# Patient Record
Sex: MALE | Race: WHITE | NOT HISPANIC OR LATINO | Employment: FULL TIME | URBAN - METROPOLITAN AREA
[De-identification: names, ages, dates, MRNs, and addresses within clinical notes are randomized per-mention and may not be internally consistent; named-entity substitution may affect disease eponyms.]

---

## 2017-11-13 ENCOUNTER — OFFICE VISIT (OUTPATIENT)
Dept: ORTHOPEDIC SURGERY | Facility: CLINIC | Age: 57
End: 2017-11-13

## 2017-11-13 VITALS
HEIGHT: 72 IN | BODY MASS INDEX: 23.57 KG/M2 | DIASTOLIC BLOOD PRESSURE: 85 MMHG | WEIGHT: 174 LBS | HEART RATE: 82 BPM | SYSTOLIC BLOOD PRESSURE: 110 MMHG

## 2017-11-13 DIAGNOSIS — M25.552 LEFT HIP PAIN: Primary | ICD-10-CM

## 2017-11-13 DIAGNOSIS — M70.62 TROCHANTERIC BURSITIS OF LEFT HIP: ICD-10-CM

## 2017-11-13 PROCEDURE — 20610 DRAIN/INJ JOINT/BURSA W/O US: CPT | Performed by: ORTHOPAEDIC SURGERY

## 2017-11-13 PROCEDURE — 99204 OFFICE O/P NEW MOD 45 MIN: CPT | Performed by: ORTHOPAEDIC SURGERY

## 2017-11-13 RX ORDER — LISINOPRIL 2.5 MG/1
2.5 TABLET ORAL DAILY
COMMUNITY
End: 2018-10-22 | Stop reason: SDUPTHER

## 2017-11-13 RX ORDER — LIDOCAINE HYDROCHLORIDE 10 MG/ML
4 INJECTION, SOLUTION INFILTRATION; PERINEURAL
Status: COMPLETED | OUTPATIENT
Start: 2017-11-13 | End: 2017-11-13

## 2017-11-13 RX ORDER — BETAMETHASONE SODIUM PHOSPHATE AND BETAMETHASONE ACETATE 3; 3 MG/ML; MG/ML
6 INJECTION, SUSPENSION INTRA-ARTICULAR; INTRALESIONAL; INTRAMUSCULAR; SOFT TISSUE
Status: COMPLETED | OUTPATIENT
Start: 2017-11-13 | End: 2017-11-13

## 2017-11-13 RX ADMIN — LIDOCAINE HYDROCHLORIDE 4 ML: 10 INJECTION, SOLUTION INFILTRATION; PERINEURAL at 08:41

## 2017-11-13 RX ADMIN — BETAMETHASONE SODIUM PHOSPHATE AND BETAMETHASONE ACETATE 6 MG: 3; 3 INJECTION, SUSPENSION INTRA-ARTICULAR; INTRALESIONAL; INTRAMUSCULAR; SOFT TISSUE at 08:41

## 2017-11-13 NOTE — PROGRESS NOTES
Procedure   Large Joint Arthrocentesis  Date/Time: 11/13/2017 8:41 AM  Consent given by: patient  Site marked: site marked  Timeout: Immediately prior to procedure a time out was called to verify the correct patient, procedure, equipment, support staff and site/side marked as required   Supporting Documentation  Indications: pain   Procedure Details  Location: hip - L greater trochanteric bursa  Preparation: Patient was prepped and draped in the usual sterile fashion  Needle size: 22 G  Approach: lateral  Medications administered: 4 mL lidocaine 1 %; 6 mg betamethasone acetate-betamethasone sodium phosphate 6 (3-3) MG/ML (4cc bupivicaine .25% NDC- 73416-079-99, Lot [id number], exp [id number])  Patient tolerance: patient tolerated the procedure well with no immediate complications

## 2017-11-13 NOTE — PROGRESS NOTES
[facility] Note    Subjective     Chief Complaint   Patient presents with   • Left Hip - Pain        HPI      Alex Parr is a 57 y.o. male.  His daughter is Zhane Molina physical therapist in [place].  He's had left hip pain for a year he points to his left hip greater trochanter.  He takes ibuprofen the pain is aching and is 2 out of 10 worse with activity and better with rest.        History reviewed. No pertinent past medical history.   Past Surgical History:   Procedure Laterality Date   • HERNIA REPAIR        Family History   Problem Relation Age of Onset   • No Known Problems Mother    • No Known Problems Father      Social History     Social History   • Marital status:      Spouse name: N/A   • Number of children: N/A   • Years of education: N/A     Occupational History   • Not on file.     Social History Main Topics   • Smoking status: Current Every Day Smoker     Packs/day: 1.00     Types: Cigarettes   • Smokeless tobacco: Never Used   • Alcohol use No   • Drug use: No   • Sexual activity: No     Other Topics Concern   • Not on file     Social History Narrative   • No narrative on file      No current outpatient prescriptions on file prior to visit.     No current facility-administered medications on file prior to visit.       No Known Allergies     The following portions of the patient's history were reviewed and updated as appropriate: allergies, current medications, past family history, past medical history, past social history, past surgical history and problem list.    Review of Systems   Constitutional: Negative.    HENT: Negative.    Eyes: Negative.    Respiratory: Negative.    Cardiovascular: Negative.    Gastrointestinal: Negative.    Endocrine: Negative.    Genitourinary: Negative.    Musculoskeletal: Positive for arthralgias.   Skin: Negative.    Allergic/Immunologic: Negative.    Neurological: Negative.    Hematological: Negative.    Psychiatric/Behavioral:  "Negative.         Objective      Physical Exam  /85  Pulse 82  Ht 72\" (182.9 cm)  Wt 174 lb (78.9 kg)  BMI 23.6 kg/m2    Body mass index is 23.6 kg/(m^2).        GENERAL APPEARANCE: awake, alert & oriented x 3, in no acute distress and well developed, well nourished  PSYCH: normal mood andaffect  LUNGS:  breathing nonlabored, no wheezing  EYES: sclera anicteric, pupils equal  CARDIOVASCULAR: palpable pulses dorsalis pedis, palpable posterior tibial bilaterally. Capillary refill less than 2 seconds  INTEGUMENTARY: skin intact, no clubbing, cyanosis  NEUROLOGIC:  Normal gait and balance            Ortho Exam  Peripheral Vascular  Lower Extremity   Edema - None bilaterally    Musculoskeletal  Lower Extremity   Left Hip    Normal range of motion    No crepitus, instability, subluxation or laxity    No known fractures or dislocations   Right Hip    Normal range of motion    No crepitus, instability, subluxation or laxity    No known fractures or dislocations     Inspection and palpation    Tenderness -      Right - none over greater trochanter      Left -over greater trochanter     Swelling - none bilaterally    Tissue tension/texture is pliable and soft bilaterally     Normal warmth bilaterally   Strength and Tone    Left hip flexors - 5/5     Right hip flexors - 5/5   Deformities/Postures/Misalignments/Discrepancies    No leg length discrepancy   Functional Testing    Stinchfield test positive bilaterally    90-90 straight leg raise negative bilaterally          Imaging/Studies  Imaging Results (last 24 hours)     Procedure Component Value Units Date/Time    XR Hip With or Without Pelvis 1 View Left [[id number]] Resulted:  11/13/17 0839     Updated:  11/13/17 0839    Narrative:       Left Hip X-Ray  Indication: Pain  AP pelvis and Frog Leg views    Findings:  No fracture  No bony lesion  Normal soft tissues  Normal joint spaces    No prior studies were available for comparison.              Assessment/Plan  "     Alex was seen today for pain.    Diagnoses and all orders for this visit:    Left hip pain  -     XR Hip With or Without Pelvis 1 View Left    Trochanteric bursitis of left hip        I gave him a left hip trochanteric bursa injection today.  He will do physical therapy with his daughter.  And follow-up in 3 weeks.    Medical Decision Making  Management Options : over-the-counter medicine, prescription/IM medicine and physical/occupational therapy  Data/Risk: radiology tests and independent visualization of imaging, lab tests, or EMG/NCV    Ranulfo Jones MD  11/13/17  8:40 AM

## 2018-10-22 ENCOUNTER — OFFICE VISIT (OUTPATIENT)
Dept: FAMILY MEDICINE CLINIC | Facility: CLINIC | Age: 58
End: 2018-10-22

## 2018-10-22 VITALS
HEIGHT: 72 IN | TEMPERATURE: 98.2 F | HEART RATE: 91 BPM | WEIGHT: 108.4 LBS | SYSTOLIC BLOOD PRESSURE: 122 MMHG | BODY MASS INDEX: 14.68 KG/M2 | DIASTOLIC BLOOD PRESSURE: 88 MMHG | OXYGEN SATURATION: 98 %

## 2018-10-22 DIAGNOSIS — I10 ESSENTIAL HYPERTENSION: Primary | ICD-10-CM

## 2018-10-22 DIAGNOSIS — E78.5 HYPERLIPIDEMIA, UNSPECIFIED HYPERLIPIDEMIA TYPE: ICD-10-CM

## 2018-10-22 DIAGNOSIS — F17.200 TOBACCO DEPENDENCE: ICD-10-CM

## 2018-10-22 DIAGNOSIS — M10.9 GOUT, UNSPECIFIED CAUSE, UNSPECIFIED CHRONICITY, UNSPECIFIED SITE: ICD-10-CM

## 2018-10-22 PROCEDURE — 90471 IMMUNIZATION ADMIN: CPT | Performed by: PHYSICIAN ASSISTANT

## 2018-10-22 PROCEDURE — 99203 OFFICE O/P NEW LOW 30 MIN: CPT | Performed by: PHYSICIAN ASSISTANT

## 2018-10-22 PROCEDURE — 90686 IIV4 VACC NO PRSV 0.5 ML IM: CPT | Performed by: PHYSICIAN ASSISTANT

## 2018-10-22 PROCEDURE — 99406 BEHAV CHNG SMOKING 3-10 MIN: CPT | Performed by: PHYSICIAN ASSISTANT

## 2018-10-22 RX ORDER — LISINOPRIL 2.5 MG/1
2.5 TABLET ORAL DAILY
Qty: 90 TABLET | Refills: 0 | Status: SHIPPED | OUTPATIENT
Start: 2018-10-22 | End: 2019-01-28 | Stop reason: SDUPTHER

## 2018-10-22 RX ORDER — PRAVASTATIN SODIUM 40 MG
40 TABLET ORAL DAILY
Qty: 90 TABLET | Refills: 0 | Status: SHIPPED | OUTPATIENT
Start: 2018-10-22 | End: 2019-01-28 | Stop reason: SDUPTHER

## 2018-10-22 NOTE — PROGRESS NOTES
I have reviewed the notes, assessments, and/or procedures performed by ABDIEL Storey, I concur with her/his documentation of Alex Parr.

## 2018-10-22 NOTE — PROGRESS NOTES
Chief Complaint   Patient presents with   • Establish Care       HPI     Alex Parr is a pleasant 58 y.o. male with PMH of tobacco dependence, hypertension, hyperlipidemia, and gout who is here to establish care and for follow-up of his chronic conditions. He is a previous patient of Dr. Hurd and JAGDISH Talavera. 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 He has an 18 pack-year history of tobacco use. Current smokes around 1/2 ppd. He quit smoking for about 1 year cold turkey. He has not recently measured blood pressures at home. It was high at an eye appointment but he had not taken his medication that day. Tolerates pravastatin well. He has been off of it for the last month or so. He denies any recent gout flares.     Past Medical History:   Diagnosis Date   • Gout    • Hyperlipidemia    • Hypertension        Past Surgical History:   Procedure Laterality Date   • HERNIA REPAIR         Family History   Problem Relation Age of Onset   • No Known Problems Mother    • No Known Problems Father        Social History     Social History   • Marital status:      Spouse name: N/A   • Number of children: N/A   • Years of education: N/A     Occupational History   • Not on file.     Social History Main Topics   • Smoking status: Current Every Day Smoker     Packs/day: 0.25     Types: Cigarettes   • Smokeless tobacco: Never Used   • Alcohol use 2.4 oz/week     4 Cans of beer per week   • Drug use: No   • Sexual activity: No     Other Topics Concern   • Not on file     Social History Narrative   • No narrative on file       No Known Allergies    ROS    Review of Systems   Constitutional: Negative.  Negative for appetite change, chills, diaphoresis, fatigue, fever, unexpected weight gain and unexpected weight loss.   HENT: Negative.  Negative for ear pain, hearing loss, nosebleeds, rhinorrhea, sore throat, trouble swallowing and voice change.    Eyes: Negative.  Negative for pain,  redness, itching and visual disturbance.   Respiratory: Negative.  Negative for cough, shortness of breath and wheezing.    Cardiovascular: Negative.  Negative for chest pain, palpitations and leg swelling.   Gastrointestinal: Negative.  Negative for abdominal pain, blood in stool, constipation, diarrhea, nausea, vomiting and GERD.   Endocrine: Negative.  Negative for cold intolerance and heat intolerance.   Genitourinary: Negative.  Negative for urinary incontinence, dysuria, frequency, hematuria and urgency.   Musculoskeletal: Negative.  Negative for arthralgias, gait problem, joint swelling and myalgias.   Skin: Negative.  Negative for color change, rash and skin lesions.   Allergic/Immunologic: Negative.    Neurological: Negative.  Negative for dizziness, seizures, syncope, weakness, light-headedness and numbness.        Negative for paresthesia   Hematological: Negative.  Negative for adenopathy. Does not bruise/bleed easily.   Psychiatric/Behavioral: Negative.  Negative for behavioral problems, sleep disturbance, suicidal ideas and depressed mood. The patient is not nervous/anxious.        Vitals:    10/22/18 1139   BP: 122/88   Pulse: 91   Temp: 98.2 °F (36.8 °C)   SpO2: 98%         Current Outpatient Prescriptions:   •  lisinopril (PRINIVIL,ZESTRIL) 2.5 MG tablet, Take 1 tablet by mouth Daily., Disp: 90 tablet, Rfl: 0  •  pravastatin (PRAVACHOL) 40 MG tablet, Take 1 tablet by mouth Daily., Disp: 90 tablet, Rfl: 0    PE    Physical Exam   Constitutional: He appears well-developed and well-nourished. No distress.   HENT:   Head: Normocephalic.   Cardiovascular: Normal rate, regular rhythm and normal heart sounds.    No murmur heard.  Pulmonary/Chest: Effort normal and breath sounds normal. He has no wheezes. He has no rales.   Neurological: He is alert.   Psychiatric: He has a normal mood and affect. His behavior is normal.   Vitals reviewed.      Results    No results found for this or any previous  visit.    A/P    Problem List Items Addressed This Visit        Cardiovascular and Mediastinum    Hypertension - Primary  -Controlled.     Relevant Medications    lisinopril (PRINIVIL,ZESTRIL) 2.5 MG tablet    Hyperlipidemia  -Monitor labs on f/u for CPE.   -Request records from [facility]    Relevant Medications    pravastatin (PRAVACHOL) 40 MG tablet       Other    Gout    Overview     No recent flares.          Tobacco dependence  -In this visit the patient was advised to stop smoking and was offered tobacco cessation measures and resources, including NRT and/or medication intervention. At least 5 minutes was spent on face-to-face counseling regarding smoking cessation.   -He declines need for assistance at this time  -He is unsure of prior pneumococcal vaccination. Request records. He would like flu shot today after discussion.           Plan of care reviewed with patient at the conclusion of today's visit. Education was provided regarding diagnosis, management and any prescribed or recommended OTC medications.  Patient verbalizes understanding of and agreement with management plan.        ABDIEL Ratliff

## 2019-01-28 ENCOUNTER — OFFICE VISIT (OUTPATIENT)
Dept: FAMILY MEDICINE CLINIC | Facility: CLINIC | Age: 59
End: 2019-01-28

## 2019-01-28 ENCOUNTER — LAB REQUISITION (OUTPATIENT)
Dept: LAB | Facility: HOSPITAL | Age: 59
End: 2019-01-28

## 2019-01-28 VITALS
WEIGHT: 177 LBS | OXYGEN SATURATION: 99 % | SYSTOLIC BLOOD PRESSURE: 130 MMHG | BODY MASS INDEX: 23.98 KG/M2 | HEIGHT: 72 IN | DIASTOLIC BLOOD PRESSURE: 82 MMHG | HEART RATE: 80 BPM

## 2019-01-28 DIAGNOSIS — J06.9 ACUTE URI: ICD-10-CM

## 2019-01-28 DIAGNOSIS — M10.9 GOUT, UNSPECIFIED CAUSE, UNSPECIFIED CHRONICITY, UNSPECIFIED SITE: ICD-10-CM

## 2019-01-28 DIAGNOSIS — Z86.010 HISTORY OF COLONIC POLYPS: ICD-10-CM

## 2019-01-28 DIAGNOSIS — I10 ESSENTIAL HYPERTENSION: ICD-10-CM

## 2019-01-28 DIAGNOSIS — F17.200 TOBACCO DEPENDENCE: ICD-10-CM

## 2019-01-28 DIAGNOSIS — Z00.00 ROUTINE GENERAL MEDICAL EXAMINATION AT A HEALTH CARE FACILITY: ICD-10-CM

## 2019-01-28 DIAGNOSIS — Z00.00 ROUTINE MEDICAL EXAM: Primary | ICD-10-CM

## 2019-01-28 DIAGNOSIS — H61.22 IMPACTED CERUMEN OF LEFT EAR: ICD-10-CM

## 2019-01-28 DIAGNOSIS — E78.5 HYPERLIPIDEMIA, UNSPECIFIED HYPERLIPIDEMIA TYPE: ICD-10-CM

## 2019-01-28 PROCEDURE — 99396 PREV VISIT EST AGE 40-64: CPT | Performed by: PHYSICIAN ASSISTANT

## 2019-01-28 PROCEDURE — 36415 COLL VENOUS BLD VENIPUNCTURE: CPT | Performed by: PHYSICIAN ASSISTANT

## 2019-01-28 RX ORDER — LISINOPRIL 2.5 MG/1
2.5 TABLET ORAL DAILY
Qty: 90 TABLET | Refills: 1 | Status: SHIPPED | OUTPATIENT
Start: 2019-01-28 | End: 2019-09-16 | Stop reason: SDUPTHER

## 2019-01-28 RX ORDER — PRAVASTATIN SODIUM 40 MG
40 TABLET ORAL DAILY
Qty: 90 TABLET | Refills: 1 | Status: SHIPPED | OUTPATIENT
Start: 2019-01-28 | End: 2019-03-26 | Stop reason: DRUGHIGH

## 2019-01-28 NOTE — PATIENT INSTRUCTIONS
-Use debrox ear drops for 2-3 days  -Use mucinex 600 mg and nasal saline spray (ocean or simply saline) twice daily  -Call the office for pneumonia vaccine (pneumovax) once you are over your sinus infection  -Check with your insurance on the cost of shingles vaccine (Shingrix). This may be obtained through a commercial pharmacy

## 2019-01-28 NOTE — PROGRESS NOTES
Reason for visit    Alex Parr is a 58 y.o. male who presents for annual comprehensive exam.    Chief Complaint   Patient presents with   • Annual Exam       HPI     He states head congestion, green rhinorrhea, left ear pain when yawning began 1 week ago. Chills 3 nights ago has resolved and he feels much better today.     He thinks could eat a healthier diet. He is very active at his work but does not get formal exercise. He is current with dental and eye exams. His tobacco use has not significantly changed since his last visit. He would like to cut back. He denies any gout flares.     The other positive responses on ROS represent chronic/stable issues and were discussed with the patient in detail.         Past Medical History:   Diagnosis Date   • Gout    • Hyperlipidemia    • Hypertension        Past Surgical History:   Procedure Laterality Date   • INGUINAL HERNIA REPAIR  2001       Family History   Problem Relation Age of Onset   • No Known Problems Mother    • Parkinsonism Father 80   • Hypertension Father        Social History     Socioeconomic History   • Marital status:      Spouse name: Not on file   • Number of children: Not on file   • Years of education: Not on file   • Highest education level: Not on file   Social Needs   • Financial resource strain: Not on file   • Food insecurity - worry: Not on file   • Food insecurity - inability: Not on file   • Transportation needs - medical: Not on file   • Transportation needs - non-medical: Not on file   Occupational History   • Not on file   Tobacco Use   • Smoking status: Current Every Day Smoker     Packs/day: 0.50     Years: 35.00     Pack years: 17.50     Types: Cigarettes   • Smokeless tobacco: Never Used   • Tobacco comment: 1/2 ppd current   Substance and Sexual Activity   • Alcohol use: Yes     Alcohol/week: 2.4 oz     Types: 4 Cans of beer per week   • Drug use: No   • Sexual activity: No   Other Topics Concern   • Not on file   Social  History Narrative   • Not on file       No Known Allergies    ROS    Review of Systems   Constitutional: Positive for chills. Negative for diaphoresis, fever and unexpected weight loss.   HENT: Positive for ear pain, rhinorrhea and sinus pressure. Negative for hearing loss, sore throat, trouble swallowing and voice change.    Eyes: Negative for blurred vision and visual disturbance.   Respiratory: Negative for apnea, cough, shortness of breath and wheezing.    Cardiovascular: Negative for chest pain, palpitations and leg swelling.   Gastrointestinal: Negative for abdominal pain, blood in stool, constipation, diarrhea and GERD.   Endocrine: Negative for cold intolerance, heat intolerance, polydipsia and polyuria.   Genitourinary: Negative for dysuria, frequency, hematuria, nocturia, scrotal swelling and testicular pain.   Musculoskeletal: Positive for arthralgias (occasional stiffness after work) and myalgias.   Skin: Negative for rash and skin lesions.   Allergic/Immunologic: Negative for environmental allergies and food allergies.   Neurological: Negative for dizziness, weakness, light-headedness, numbness, headache and memory problem.   Hematological: Negative for adenopathy.   Psychiatric/Behavioral: Negative for depressed mood. The patient is not nervous/anxious.        Vitals:    01/28/19 1533   BP: 130/82   Pulse: 80   SpO2: 99%         Current Outpatient Medications:   •  lisinopril (PRINIVIL,ZESTRIL) 2.5 MG tablet, Take 1 tablet by mouth Daily., Disp: 90 tablet, Rfl: 1  •  pravastatin (PRAVACHOL) 40 MG tablet, Take 1 tablet by mouth Daily., Disp: 90 tablet, Rfl: 1    PE    Physical Exam   Constitutional: He is oriented to person, place, and time. He appears well-developed and well-nourished. No distress.   HENT:   Head: Normocephalic and atraumatic.   Right Ear: Tympanic membrane and external ear normal.   Left Ear: An impacted cerumen is present.  Nose: Nose normal. Right sinus exhibits no maxillary sinus  tenderness and no frontal sinus tenderness. Left sinus exhibits no maxillary sinus tenderness and no frontal sinus tenderness.   Mouth/Throat: Oropharynx is clear and moist and mucous membranes are normal. Normal dentition. No posterior oropharyngeal erythema.   Eyes: Conjunctivae, EOM and lids are normal. Pupils are equal, round, and reactive to light.        Neck: Normal range of motion. Neck supple. No JVD present. Carotid bruit is not present. No thyroid mass and no thyromegaly present.   Cardiovascular: Normal rate, regular rhythm, normal heart sounds and intact distal pulses.   No murmur heard.  Pulmonary/Chest: Effort normal and breath sounds normal.   Abdominal: Soft. Bowel sounds are normal. He exhibits no abdominal bruit and no mass. There is no hepatosplenomegaly. There is no tenderness.   Genitourinary: Testes normal and penis normal. Right testis shows no mass and no tenderness. Left testis shows no mass and no tenderness. Circumcised.   Genitourinary Comments: KASIA deferred   Musculoskeletal: Normal range of motion. He exhibits no edema.   Lymphadenopathy:     He has no cervical adenopathy.        Right: No inguinal and no supraclavicular adenopathy present.        Left: No inguinal and no supraclavicular adenopathy present.   Neurological: He is alert and oriented to person, place, and time. He has normal strength. He displays normal reflexes. No cranial nerve deficit. Gait normal.   Skin: Skin is warm and dry. No rash noted. No cyanosis. Nails show no clubbing.   No suspicious lesions.    Psychiatric: He has a normal mood and affect. His speech is normal and behavior is normal.   Vitals reviewed.      A/P    Problem List Items Addressed This Visit        Cardiovascular and Mediastinum    Hypertension  -Controlled    Relevant Medications    lisinopril (PRINIVIL,ZESTRIL) 2.5 MG tablet    Hyperlipidemia  -On statin. Due for monitoring    Relevant Medications    pravastatin (PRAVACHOL) 40 MG tablet        Other    Gout    Overview     No recent flares.          Tobacco dependence  -Encouraged patient to quit smoking. He has historically declined cessation assistance.       History of colonic polyps    Overview     7/16 tubular adenoma, repeat colonoscopy 5 years         Other Visit Diagnoses     Routine medical exam    -  Primary  -Age appropriate screening/counseling performed  -Patient is check with insurance on cost of Shingrix and return when well for pneumovax  -Prostate cancer screening/PSA monitoring discussed including benefit of early detection, potential need for follow-up, and possible harms associated with additional management. Patient agrees to every other year screening/PSA monitoring.    -AUDIT-C 7 today. We discussed his at-risk alcohol consumption and reducing intake.     Relevant Orders    Comprehensive Metabolic Panel    Lipid Panel    TSH    PSA Screen    Vitamin D 25 Hydroxy    Urinalysis With Culture If Indicated - Urine, Clean Catch    Hepatitis C Antibody    Uric Acid    CBC & Differential    Acute URI      -Improving. Advised patient call the office or rtc if symptoms do not continue to improve.     Cerumen impaction, left  -Debrox otc discussed          Plan of care reviewed with patient at the conclusion of today's visit. Education was provided regarding diagnosis, management and any prescribed or recommended OTC medications.  Patient verbalizes understanding of and agreement with management plan.        ABDIEL Ratliff

## 2019-01-29 LAB
25(OH)D3+25(OH)D2 SERPL-MCNC: 10.2 NG/ML (ref 30–100)
ALBUMIN SERPL-MCNC: 5 G/DL (ref 3.5–5.5)
ALBUMIN/GLOB SERPL: 1.9 {RATIO} (ref 1.2–2.2)
ALP SERPL-CCNC: 75 IU/L (ref 39–117)
ALT SERPL-CCNC: 7 IU/L (ref 0–44)
APPEARANCE UR: CLEAR
AST SERPL-CCNC: 17 IU/L (ref 0–40)
BACTERIA #/AREA URNS HPF: ABNORMAL /[HPF]
BASOPHILS # BLD AUTO: 0 X10E3/UL (ref 0–0.2)
BASOPHILS NFR BLD AUTO: 0 %
BILIRUB SERPL-MCNC: 0.4 MG/DL (ref 0–1.2)
BILIRUB UR QL STRIP: NEGATIVE
BUN SERPL-MCNC: 14 MG/DL (ref 6–24)
BUN/CREAT SERPL: 14 (ref 9–20)
CALCIUM SERPL-MCNC: 9.6 MG/DL (ref 8.7–10.2)
CASTS URNS MICRO: ABNORMAL
CASTS URNS QL MICRO: PRESENT /LPF
CHLORIDE SERPL-SCNC: 104 MMOL/L (ref 96–106)
CHOLEST SERPL-MCNC: 216 MG/DL (ref 100–199)
CO2 SERPL-SCNC: 22 MMOL/L (ref 20–29)
COLOR UR: YELLOW
CREAT SERPL-MCNC: 1.01 MG/DL (ref 0.76–1.27)
EOSINOPHIL # BLD AUTO: 0.5 X10E3/UL (ref 0–0.4)
EOSINOPHIL NFR BLD AUTO: 6 %
EPI CELLS #/AREA URNS HPF: ABNORMAL /HPF
ERYTHROCYTE [DISTWIDTH] IN BLOOD BY AUTOMATED COUNT: 13.2 % (ref 12.3–15.4)
GLOBULIN SER CALC-MCNC: 2.6 G/DL (ref 1.5–4.5)
GLUCOSE SERPL-MCNC: 94 MG/DL (ref 65–99)
GLUCOSE UR QL: NEGATIVE
HCT VFR BLD AUTO: 44.9 % (ref 37.5–51)
HCV AB S/CO SERPL IA: <0.1 S/CO RATIO (ref 0–0.9)
HDLC SERPL-MCNC: 44 MG/DL
HGB BLD-MCNC: 15.1 G/DL (ref 13–17.7)
HGB UR QL STRIP: NEGATIVE
IMM GRANULOCYTES # BLD AUTO: 0 X10E3/UL (ref 0–0.1)
IMM GRANULOCYTES NFR BLD AUTO: 0 %
KETONES UR QL STRIP: NEGATIVE
LDLC SERPL CALC-MCNC: 138 MG/DL (ref 0–99)
LEUKOCYTE ESTERASE UR QL STRIP: NEGATIVE
LYMPHOCYTES # BLD AUTO: 2.3 X10E3/UL (ref 0.7–3.1)
LYMPHOCYTES NFR BLD AUTO: 24 %
MCH RBC QN AUTO: 30.1 PG (ref 26.6–33)
MCHC RBC AUTO-ENTMCNC: 33.6 G/DL (ref 31.5–35.7)
MCV RBC AUTO: 89 FL (ref 79–97)
MICRO URNS: NORMAL
MICRO URNS: NORMAL
MONOCYTES # BLD AUTO: 0.8 X10E3/UL (ref 0.1–0.9)
MONOCYTES NFR BLD AUTO: 8 %
MUCOUS THREADS URNS QL MICRO: PRESENT
NEUTROPHILS # BLD AUTO: 5.8 X10E3/UL (ref 1.4–7)
NEUTROPHILS NFR BLD AUTO: 62 %
NITRITE UR QL STRIP: NEGATIVE
PH UR STRIP: 5 [PH] (ref 5–7.5)
PLATELET # BLD AUTO: 295 X10E3/UL (ref 150–379)
POTASSIUM SERPL-SCNC: 4.7 MMOL/L (ref 3.5–5.2)
PROT SERPL-MCNC: 7.6 G/DL (ref 6–8.5)
PROT UR QL STRIP: NEGATIVE
PSA SERPL-MCNC: 0.9 NG/ML (ref 0–4)
RBC # BLD AUTO: 5.02 X10E6/UL (ref 4.14–5.8)
RBC #/AREA URNS HPF: ABNORMAL /HPF
SODIUM SERPL-SCNC: 141 MMOL/L (ref 134–144)
SP GR UR: 1.02 (ref 1–1.03)
TRIGL SERPL-MCNC: 171 MG/DL (ref 0–149)
TSH SERPL DL<=0.005 MIU/L-ACNC: 3.8 UIU/ML (ref 0.45–4.5)
URATE SERPL-MCNC: 7.2 MG/DL (ref 3.7–8.6)
URINALYSIS REFLEX: NORMAL
UROBILINOGEN UR STRIP-MCNC: 0.2 MG/DL (ref 0.2–1)
VLDLC SERPL CALC-MCNC: 34 MG/DL (ref 5–40)
WBC # BLD AUTO: 9.5 X10E3/UL (ref 3.4–10.8)
WBC #/AREA URNS HPF: ABNORMAL /HPF

## 2019-02-11 PROBLEM — E55.9 VITAMIN D DEFICIENCY: Status: ACTIVE | Noted: 2019-02-11

## 2019-02-11 RX ORDER — ERGOCALCIFEROL 1.25 MG/1
50000 CAPSULE ORAL WEEKLY
Qty: 12 CAPSULE | Refills: 0 | Status: SHIPPED | OUTPATIENT
Start: 2019-02-11 | End: 2019-11-04

## 2019-03-26 RX ORDER — PRAVASTATIN SODIUM 80 MG/1
80 TABLET ORAL DAILY
Qty: 90 TABLET | Refills: 1 | Status: SHIPPED | OUTPATIENT
Start: 2019-03-26 | End: 2019-11-04 | Stop reason: SDUPTHER

## 2019-03-26 NOTE — TELEPHONE ENCOUNTER
Pt wife requested refill of pravastatin 80 mg for patient. Stated that Zaid told pt to increase from 40 mg daily to 80mg daily. Was taking 2 40 mg tablets and requested refill for just 1 80 mg tablet.    Sent to patient pharmacy

## 2019-09-16 ENCOUNTER — TELEPHONE (OUTPATIENT)
Dept: FAMILY MEDICINE CLINIC | Facility: CLINIC | Age: 59
End: 2019-09-16

## 2019-09-16 RX ORDER — LISINOPRIL 2.5 MG/1
2.5 TABLET ORAL DAILY
Qty: 30 TABLET | Refills: 0 | Status: SHIPPED | OUTPATIENT
Start: 2019-09-16 | End: 2019-11-04

## 2019-09-16 NOTE — TELEPHONE ENCOUNTER
PATIENT CALLED FOR MED REFILL FOR  lisinopril (PRINIVIL,ZESTRIL) 2.5 MG tablet   Medication   Date: 1/28/2019 Department: [facility] Ordering/Authorizing: Zaid Pena PA   Order Providers     [facility]    PATIENT CALL BACK NUMBER  [phone]  THANK YOU

## 2019-11-04 ENCOUNTER — LAB REQUISITION (OUTPATIENT)
Dept: LAB | Facility: HOSPITAL | Age: 59
End: 2019-11-04

## 2019-11-04 ENCOUNTER — OFFICE VISIT (OUTPATIENT)
Dept: FAMILY MEDICINE CLINIC | Facility: CLINIC | Age: 59
End: 2019-11-04

## 2019-11-04 VITALS
WEIGHT: 177 LBS | SYSTOLIC BLOOD PRESSURE: 110 MMHG | DIASTOLIC BLOOD PRESSURE: 84 MMHG | HEIGHT: 72 IN | OXYGEN SATURATION: 96 % | HEART RATE: 94 BPM | BODY MASS INDEX: 23.98 KG/M2

## 2019-11-04 DIAGNOSIS — E78.5 HYPERLIPIDEMIA, UNSPECIFIED HYPERLIPIDEMIA TYPE: ICD-10-CM

## 2019-11-04 DIAGNOSIS — I10 ESSENTIAL HYPERTENSION: Primary | ICD-10-CM

## 2019-11-04 DIAGNOSIS — M10.9 GOUT, UNSPECIFIED CAUSE, UNSPECIFIED CHRONICITY, UNSPECIFIED SITE: ICD-10-CM

## 2019-11-04 DIAGNOSIS — F17.200 TOBACCO DEPENDENCE: ICD-10-CM

## 2019-11-04 DIAGNOSIS — Z00.00 ROUTINE GENERAL MEDICAL EXAMINATION AT A HEALTH CARE FACILITY: ICD-10-CM

## 2019-11-04 LAB
ALBUMIN SERPL-MCNC: 4.9 G/DL (ref 3.5–5.2)
ALBUMIN/GLOB SERPL: 1.9 G/DL
ALP SERPL-CCNC: 89 U/L (ref 39–117)
ALT SERPL-CCNC: 8 U/L (ref 1–41)
AST SERPL-CCNC: 17 U/L (ref 1–40)
BILIRUB SERPL-MCNC: 0.5 MG/DL (ref 0.2–1.2)
BUN SERPL-MCNC: 10 MG/DL (ref 6–20)
BUN/CREAT SERPL: 10.5 (ref 7–25)
CALCIUM SERPL-MCNC: 9.5 MG/DL (ref 8.6–10.5)
CHLORIDE SERPL-SCNC: 103 MMOL/L (ref 98–107)
CHOLEST SERPL-MCNC: 207 MG/DL (ref 0–200)
CO2 SERPL-SCNC: 24.1 MMOL/L (ref 22–29)
CREAT SERPL-MCNC: 0.95 MG/DL (ref 0.76–1.27)
GLOBULIN SER CALC-MCNC: 2.6 GM/DL
GLUCOSE SERPL-MCNC: 98 MG/DL (ref 65–99)
HDLC SERPL-MCNC: 46 MG/DL (ref 40–60)
LDLC SERPL CALC-MCNC: 121 MG/DL (ref 0–100)
POTASSIUM SERPL-SCNC: 4.4 MMOL/L (ref 3.5–5.2)
PROT SERPL-MCNC: 7.5 G/DL (ref 6–8.5)
SODIUM SERPL-SCNC: 143 MMOL/L (ref 136–145)
TRIGL SERPL-MCNC: 199 MG/DL (ref 0–150)
VLDLC SERPL CALC-MCNC: 39.8 MG/DL

## 2019-11-04 PROCEDURE — 99214 OFFICE O/P EST MOD 30 MIN: CPT | Performed by: PHYSICIAN ASSISTANT

## 2019-11-04 PROCEDURE — 90674 CCIIV4 VAC NO PRSV 0.5 ML IM: CPT | Performed by: PHYSICIAN ASSISTANT

## 2019-11-04 PROCEDURE — 90472 IMMUNIZATION ADMIN EACH ADD: CPT | Performed by: PHYSICIAN ASSISTANT

## 2019-11-04 PROCEDURE — 90471 IMMUNIZATION ADMIN: CPT | Performed by: PHYSICIAN ASSISTANT

## 2019-11-04 PROCEDURE — 90732 PPSV23 VACC 2 YRS+ SUBQ/IM: CPT | Performed by: PHYSICIAN ASSISTANT

## 2019-11-04 PROCEDURE — 36415 COLL VENOUS BLD VENIPUNCTURE: CPT | Performed by: PHYSICIAN ASSISTANT

## 2019-11-04 RX ORDER — PRAVASTATIN SODIUM 80 MG/1
80 TABLET ORAL DAILY
Qty: 90 TABLET | Refills: 1 | Status: SHIPPED | OUTPATIENT
Start: 2019-11-04 | End: 2020-02-03

## 2019-11-04 NOTE — PROGRESS NOTES
"    Chief Complaint   Patient presents with   • Hypotension     stopped bp meds a couple weeks ago, 98/70 noticed last month, last night it was 117/70 no dizziness at all or fatigue       HPI     Alex Parr is a pleasant 59 y.o. male who presents for evaluation of \"chief complaint.\"  Patient states he started to check his blood pressures at home a couple of months ago because his wife was checking her readings. He reports low blood pressures in the 90s over 70s on average so he stopped lisinopril last month. His home readings since then have still been in the 90s/70s on average. He has felt well and denies lightheadedness, dizziness or shortness of breath. He did cut down on beer intake a couple of months ago. No other change in diet or medications. He is taking vitamin D over-the-counter and did not start prescription vitamin D in January. He did increase pravastatin to 80 mg qd. He is just getting over a gout flare in his right lateral foot and ankle. It is about gone now. This has been the only flare this year. He is cutting back on cigarettes.     Past Medical History:   Diagnosis Date   • Gout    • Hyperlipidemia    • Hypertension        Past Surgical History:   Procedure Laterality Date   • INGUINAL HERNIA REPAIR  2001       Family History   Problem Relation Age of Onset   • No Known Problems Mother    • Parkinsonism Father 80   • Hypertension Father        Social History     Socioeconomic History   • Marital status:      Spouse name: Not on file   • Number of children: Not on file   • Years of education: Not on file   • Highest education level: Not on file   Tobacco Use   • Smoking status: Current Every Day Smoker     Packs/day: 0.50     Years: 35.00     Pack years: 17.50     Types: Cigarettes   • Smokeless tobacco: Never Used   • Tobacco comment: 1/2 ppd current   Substance and Sexual Activity   • Alcohol use: Yes     Alcohol/week: 2.4 oz     Types: 4 Cans of beer per week   • Drug use: No   • " Sexual activity: No       No Known Allergies    ROS    Review of Systems   Constitutional: Negative for fatigue.   Respiratory: Negative for shortness of breath.    Cardiovascular: Negative for chest pain.   Neurological: Negative for dizziness, syncope and light-headedness.       Vitals:    11/04/19 0835   BP: 110/84   Pulse: 94   SpO2: 96%         Current Outpatient Medications:   •  pravastatin (PRAVACHOL) 80 MG tablet, Take 1 tablet by mouth Daily., Disp: 90 tablet, Rfl: 1    PE    Physical Exam   Constitutional: He appears well-developed and well-nourished. No distress.   HENT:   Head: Normocephalic and atraumatic.   Eyes: Conjunctivae and EOM are normal.   Neck: Normal range of motion.   Cardiovascular: Normal rate, regular rhythm and normal heart sounds.   No murmur heard.  Pulmonary/Chest: Effort normal and breath sounds normal.   Neurological: He is alert. Gait normal.   Skin: Skin is warm and dry.   Psychiatric: He has a normal mood and affect. His speech is normal and behavior is normal.   Vitals reviewed.       A/P    Problem List Items Addressed This Visit        Cardiovascular and Mediastinum    Hypertension  -BP for me 116/86 on repeat. Home readings have been lower. Discussed goal < 130/80  -Continue to monitor blood pressures at home  -Remain off of lisinopril at this time and report any persistent readings >130/80      Hyperlipidemia  -We will monitor LFTs and lipids due to increase in pravastatin    Relevant Medications    pravastatin (PRAVACHOL) 80 MG tablet    Other Relevant Orders    Comprehensive Metabolic Panel    Lipid Panel       Other    Gout - Primary    Overview      -1/19 uric acid 7.2 Low purine diet, limiting alcohol use, allopurinol advised  -Patient would like to work on a low purine diet and avoid prophylactic allopurinol at this time  -If he does have another flare this year, I recommended he start allopurinol  -Repeat uric acid in January at [email or web address]  dependence  -Advised patient to stop smoking  -He denies assistance at this time and wants to cut back on his own  -Interested in flu and pneumonia vaccines today          Plan of care reviewed with patient at the conclusion of today's visit. Education was provided regarding diagnosis, management and any prescribed or recommended OTC medications.  Patient verbalizes understanding of and agreement with management plan.        ABDIEL Ratliff

## 2019-11-04 NOTE — PATIENT INSTRUCTIONS
Low-Purine Eating Plan  A low-purine eating plan involves making food choices to limit your intake of purine. Purine is a kind of uric acid. Too much uric acid in your blood can cause certain conditions, such as gout and kidney stones. Eating a low-purine diet can help control these conditions.  What are tips for following this plan?  Reading food labels    · Avoid foods with saturated or Trans fat.  · Check the ingredient list of grains-based foods, such as bread and cereal, to make sure that they contain whole grains.  · Check the ingredient list of sauces or soups to make sure they do not contain meat or fish.  · When choosing soft drinks, check the ingredient list to make sure they do not contain high-fructose corn syrup.  Shopping  · Buy plenty of fresh fruits and vegetables.  · Avoid buying canned or fresh fish.  · Buy dairy products labeled as low-fat or nonfat.  · Avoid buying premade or processed foods. These foods are often high in fat, salt (sodium), and added sugar.  Cooking  · Use olive oil instead of butter when cooking. Oils like olive oil, canola oil, and sunflower oil contain healthy fats.  Meal planning  · Learn which foods do or do not affect you. If you find out that a food tends to cause your gout symptoms to flare up, avoid eating that food. You can enjoy foods that do not cause problems. If you have any questions about a food item, talk with your dietitian or health care provider.  · Limit foods high in fat, especially saturated fat. Fat makes it harder for your body to get rid of uric acid.  · Choose foods that are lower in fat and are lean sources of protein.  General guidelines  · Limit alcohol intake to no more than 1 drink a day for nonpregnant women and 2 drinks a day for men. One drink equals 12 oz of beer, 5 oz of wine, or 1½ oz of hard liquor. Alcohol can affect the way your body gets rid of uric acid.  · Drink plenty of water to keep your urine clear or pale yellow. Fluids can help  remove uric acid from your body.  · If directed by your health care provider, take a vitamin C supplement.  · Work with your health care provider and dietitian to develop a plan to achieve or maintain a healthy weight. Losing weight can help reduce uric acid in your blood.  What foods are recommended?  The items listed may not be a complete list. Talk with your dietitian about what dietary choices are best for you.  Foods low in purines  Foods low in purines do not need to be limited. These include:  · All fruits.  · All low-purine vegetables, pickles, and olives.  · Breads, pasta, rice, cornbread, and popcorn. Cake and other baked goods.  · All dairy foods.  · Eggs, nuts, and nut butters.  · Spices and condiments, such as salt, herbs, and vinegar.  · Plant oils, butter, and margarine.  · Water, sugar-free soft drinks, tea, coffee, and cocoa.  · Vegetable-based soups, broths, sauces, and gravies.  Foods moderate in purines  Foods moderate in purines should be limited to the amounts listed.  · ½ cup of asparagus, cauliflower, spinach, mushrooms, or green peas, each day.  · 2/3 cup uncooked oatmeal, each day.  · ¼ cup dry wheat bran or wheat germ, each day.  · 2-3 ounces of meat or poultry, each day.  · 4-6 ounces of shellfish, such as crab, lobster, oysters, or shrimp, each day.  · 1 cup cooked beans, peas, or lentils, each day.  · Soup, broths, or bouillon made from meat or fish. Limit these foods as much as possible.  What foods are not recommended?  The items listed may not be a complete list. Talk with your dietitian about what dietary choices are best for you.  Limit your intake of foods high in purines, including:  · Beer and other alcohol.  · Meat-based gravy or sauce.  · Canned or fresh fish, such as:  ? Anchovies, sardines, herring, and tuna.  ? Mussels and scallops.  ? Codfish, trout, and jacinto.  · Lafleur.  · Organ meats, such as:  ? Liver or kidney.  ? Tripe.  ? Sweetbreads (thymus gland or  pancreas).  · Wild game or goose.  · Yeast or yeast extract supplements.  · Drinks sweetened with high-fructose corn syrup.  Summary  · Eating a low-purine diet can help control conditions caused by too much uric acid in the body, such as gout or kidney stones.  · Choose low-purine foods, limit alcohol, and limit foods high in fat.  · You will learn over time which foods do or do not affect you. If you find out that a food tends to cause your gout symptoms to flare up, avoid eating that food.  This information is not intended to replace advice given to you by your health care provider. Make sure you discuss any questions you have with your health care provider.  Document Released: 04/13/2012 Document Revised: 01/31/2018 Document Reviewed: 01/31/2018  [employer] Interactive Patient Education © 2019 [employer]

## 2019-11-05 ENCOUNTER — TELEPHONE (OUTPATIENT)
Dept: FAMILY MEDICINE CLINIC | Facility: CLINIC | Age: 59
End: 2019-11-05

## 2019-11-05 RX ORDER — ROSUVASTATIN CALCIUM 20 MG/1
20 TABLET, COATED ORAL DAILY
Qty: 30 TABLET | Refills: 2 | Status: SHIPPED | OUTPATIENT
Start: 2019-11-05 | End: 2020-02-03 | Stop reason: SDUPTHER

## 2019-11-05 NOTE — TELEPHONE ENCOUNTER
----- Message from ABDIEL Ratliff sent at 11/5/2019  8:01 AM EST -----  Please inform patient of results:   1) His bad cholesterol is high and only mildly lower than the last measurement. Recommend stopping pravastatin and starting rosuvastatin 20 mg qd to help lower his bad cholesterol and cardiovascular risk. I will send a new prescription for rosuvastatin to his pharmacy if he would like to begin. His triglycerides were also mildly high. Reducing excess sugars/carbs in his diet, increasing exercise, and smoking cessation should help lower them.  2) His other results were unremarkable

## 2019-11-05 NOTE — TELEPHONE ENCOUNTER
PT CALLED BACK ABOUT HIS LAB RESULTS. OK TO LEAVE [facility] IF HE DOESN'T ANSWER.

## 2019-11-05 NOTE — TELEPHONE ENCOUNTER
Informed to watch for side effects (rash, SOB, bad pain, spreading redness,etc) pt states its just sore to touch will massage arm, use heat pads to help but will let us know if anything changes.

## 2019-11-05 NOTE — TELEPHONE ENCOUNTER
Warm transfer.  Right arm is sore and tender where he recived two shots yesterday.   No other reaction, nurse said if he thinks he is having a allergic reaction. To go to urgent care. Please call back.

## 2019-11-06 NOTE — PROGRESS NOTES
I have reviewed the notes, assessments, and/or procedures performed by Zaid Pena, I concur with her/his documentation of Alex Parr.

## 2019-11-13 ENCOUNTER — TELEPHONE (OUTPATIENT)
Dept: FAMILY MEDICINE CLINIC | Facility: CLINIC | Age: 59
End: 2019-11-13

## 2019-11-13 NOTE — TELEPHONE ENCOUNTER
"From 11/5/19 lab results  \"Please inform patient of results:   1) His bad cholesterol is high and only mildly lower than the last measurement. Recommend stopping pravastatin and starting rosuvastatin 20 mg qd to help lower his bad cholesterol and cardiovascular risk. I will send a new prescription for rosuvastatin to his pharmacy if he would like to begin. His triglycerides were also mildly high. Reducing excess sugars/carbs in his diet, increasing exercise, and smoking cessation should help lower them.\"    Pt understood  "

## 2019-11-13 NOTE — TELEPHONE ENCOUNTER
Patient states that when they went to go  medication that they were given the Pravastatin.    Patient wants to know if he was suppose to take both of the medications or if that was a mistake please advise.

## 2020-02-03 ENCOUNTER — OFFICE VISIT (OUTPATIENT)
Dept: FAMILY MEDICINE CLINIC | Facility: CLINIC | Age: 60
End: 2020-02-03

## 2020-02-03 ENCOUNTER — LAB REQUISITION (OUTPATIENT)
Dept: LAB | Facility: HOSPITAL | Age: 60
End: 2020-02-03

## 2020-02-03 VITALS
DIASTOLIC BLOOD PRESSURE: 78 MMHG | SYSTOLIC BLOOD PRESSURE: 110 MMHG | HEART RATE: 94 BPM | HEIGHT: 72 IN | BODY MASS INDEX: 23.98 KG/M2 | WEIGHT: 177 LBS | OXYGEN SATURATION: 96 %

## 2020-02-03 DIAGNOSIS — E55.9 VITAMIN D DEFICIENCY: ICD-10-CM

## 2020-02-03 DIAGNOSIS — M10.9 GOUT INVOLVING TOE OF RIGHT FOOT, UNSPECIFIED CAUSE, UNSPECIFIED CHRONICITY: ICD-10-CM

## 2020-02-03 DIAGNOSIS — I10 ESSENTIAL HYPERTENSION: ICD-10-CM

## 2020-02-03 DIAGNOSIS — E78.5 HYPERLIPIDEMIA, UNSPECIFIED HYPERLIPIDEMIA TYPE: ICD-10-CM

## 2020-02-03 DIAGNOSIS — L98.9 SKIN LESION: ICD-10-CM

## 2020-02-03 DIAGNOSIS — Z00.00 ROUTINE MEDICAL EXAM: Primary | ICD-10-CM

## 2020-02-03 DIAGNOSIS — Z00.00 ROUTINE GENERAL MEDICAL EXAMINATION AT A HEALTH CARE FACILITY: ICD-10-CM

## 2020-02-03 DIAGNOSIS — F17.200 TOBACCO DEPENDENCE: ICD-10-CM

## 2020-02-03 DIAGNOSIS — Z86.010 HISTORY OF COLONIC POLYPS: ICD-10-CM

## 2020-02-03 PROCEDURE — 99396 PREV VISIT EST AGE 40-64: CPT | Performed by: PHYSICIAN ASSISTANT

## 2020-02-03 PROCEDURE — 36415 COLL VENOUS BLD VENIPUNCTURE: CPT | Performed by: PHYSICIAN ASSISTANT

## 2020-02-03 RX ORDER — ROSUVASTATIN CALCIUM 20 MG/1
20 TABLET, COATED ORAL DAILY
Qty: 90 TABLET | Refills: 3 | Status: SHIPPED | OUTPATIENT
Start: 2020-02-03 | End: 2021-02-22 | Stop reason: SDUPTHER

## 2020-02-03 RX ORDER — COLCHICINE 0.6 MG/1
TABLET ORAL
Qty: 3 TABLET | Refills: 0 | Status: SHIPPED | OUTPATIENT
Start: 2020-02-03 | End: 2020-04-29 | Stop reason: SDUPTHER

## 2020-02-03 NOTE — PROGRESS NOTES
"Reason for visit    Alex Parr is a 59 y.o. male who presents for annual comprehensive exam.     Chief Complaint   Patient presents with   • Annual Exam       HPI     Here for exam. Patient states he has been getting over the \"funk.\" Several family members have had similar URIs. He is mildly hoarse but is \"over it\" now. He has not had a cigarette since 1/23 and plans to continue tobacco cessation. He has some discomfort in his right big toe with bending since this morning. He had 1 gout flare last year. Eats a healthy diet. He walks frequently at work but does not otherwise exercise. Current with dental/eye exams. Positive responses on ROS representing chronic or minor issues were reviewed in detail.      Past Medical History:   Diagnosis Date   • Gout    • Hyperlipidemia    • Hypertension        Past Surgical History:   Procedure Laterality Date   • INGUINAL HERNIA REPAIR  2001       Family History   Problem Relation Age of Onset   • No Known Problems Mother    • Parkinsonism Father 80   • Hypertension Father        Social History     Socioeconomic History   • Marital status:      Spouse name: Not on file   • Number of children: Not on file   • Years of education: Not on file   • Highest education level: Not on file   Tobacco Use   • Smoking status: Current Every Day Smoker     Packs/day: 0.50     Years: 35.00     Pack years: 17.50     Types: Cigarettes   • Smokeless tobacco: Never Used   • Tobacco comment: 1/2 ppd current   Substance and Sexual Activity   • Alcohol use: Yes     Alcohol/week: 14.0 standard drinks     Types: 14 Glasses of wine per week     Comment: 2 glasses of wine/night   • Drug use: No   • Sexual activity: Never       No Known Allergies    ROS    Review of Systems   Constitutional: Negative for appetite change, chills, diaphoresis, fatigue, fever and unexpected weight loss.   HENT: Positive for voice change. Negative for congestion, hearing loss, sore throat and swollen glands.    Eyes: " Negative for blurred vision and visual disturbance.   Respiratory: Negative for apnea, cough, choking, shortness of breath and wheezing.    Cardiovascular: Negative for chest pain, palpitations and leg swelling.   Gastrointestinal: Positive for GERD (occasional, with spicy foods, tums improves). Negative for abdominal pain, constipation, diarrhea, nausea and vomiting.   Endocrine: Negative for polydipsia and polyuria.   Genitourinary: Positive for erectile dysfunction. Negative for dysuria, frequency, hematuria, nocturia, scrotal swelling, testicular pain and urinary incontinence.   Musculoskeletal: Negative for arthralgias and myalgias.   Skin: Negative for rash and skin lesions.   Allergic/Immunologic: Negative for environmental allergies.   Neurological: Positive for numbness (numbness in both hands at night, improves with position change). Negative for dizziness, syncope, weakness, light-headedness, headache and memory problem.   Hematological: Negative for adenopathy.   Psychiatric/Behavioral: Negative for sleep disturbance and depressed mood. The patient is not nervous/anxious.        Vitals:    02/03/20 1350   BP: 110/78   Pulse: 94   SpO2: 96%         Current Outpatient Medications:   •  rosuvastatin (CRESTOR) 20 MG tablet, Take 1 tablet by mouth Daily., Disp: 90 tablet, Rfl: 3  •  colchicine 0.6 MG tablet, Take 2 tabs po x 1 dose, then 1 tab po 1 hour later, Disp: 3 tablet, Rfl: 0    PE    Physical Exam   Constitutional: He is oriented to person, place, and time. He appears well-developed and well-nourished. No distress.   HENT:   Head: Normocephalic and atraumatic.   Right Ear: Hearing, tympanic membrane and external ear normal.   Left Ear: Hearing, tympanic membrane and external ear normal.   Nose: Nose normal.   Mouth/Throat: Uvula is midline, oropharynx is clear and moist and mucous membranes are normal. Normal dentition.   Eyes: Pupils are equal, round, and reactive to light. Conjunctivae, EOM and lids  are normal.        Neck: Normal range of motion. Neck supple. Carotid bruit is not present. No thyroid mass and no thyromegaly present.   Cardiovascular: Normal rate, regular rhythm, normal heart sounds and intact distal pulses.   No murmur heard.  Pulmonary/Chest: Effort normal and breath sounds normal. He has no wheezes. He has no rhonchi. He has no rales.   Abdominal: Soft. Bowel sounds are normal. He exhibits no distension, no abdominal bruit and no mass. There is no tenderness.   Musculoskeletal: Normal range of motion. He exhibits no edema.        Lymphadenopathy:     He has no cervical adenopathy.        Right: No inguinal and no supraclavicular adenopathy present.        Left: No inguinal and no supraclavicular adenopathy present.   Neurological: He is alert and oriented to person, place, and time. He has normal strength. He displays normal reflexes. Gait normal.   Skin: Skin is warm and dry. No rash noted. No cyanosis. Nails show no clubbing.   Several subcentimeter scaly macules left leg, right forearm, and upper back   Psychiatric: He has a normal mood and affect. His speech is normal and behavior is normal.   Vitals reviewed.      A/P    Problem List Items Addressed This Visit        Cardiovascular and Mediastinum    Hypertension  -Has been able to come off of medications since stopping beer  -Good control      Hyperlipidemia  -Monitor lipids    Relevant Medications    rosuvastatin (CRESTOR) 20 MG tablet       Digestive    Vitamin D deficiency    Overview     -Taking otc supplement  -Monitor          Other    Gout    Overview     -1/19 uric acid 7.2 Low purine diet, limiting alcohol use, allopurinol advised  -Possible flare right great toe.  Will treat with Colcrys  -Monitor uric acid         Tobacco dependence  -Congratulated patient and encouraged continued tobacco cessation  -He does not qualify for lung cancer screening      History of colonic polyps    Overview     7/16 tubular adenoma, repeat  colonoscopy 5 years  -Colonoscopy due next year         Other Visit Diagnoses     Routine medical exam    -  Primary  -Counseled patient regarding immunizations, healthy lifestyle, diet, maintaining a healthy weight, and exercise. Annual dental and eye exams were encouraged  -Recommended Shingrix from local pharmacy    Relevant Orders    CBC & Differential    Comprehensive Metabolic Panel    Lipid Panel    Hemoglobin A1c    TSH    PSA Screen    Uric Acid    Skin lesion      -?Actinic keratoses  -Refer to dermatology    Relevant Orders    Ambulatory Referral to Dermatology          Plan of care reviewed with patient at the conclusion of today's visit. Education was provided regarding diagnosis, management and any prescribed or recommended OTC medications.  Patient verbalizes understanding of and agreement with management plan.        ABDIEL Ratliff

## 2020-02-04 LAB
ALBUMIN SERPL-MCNC: 4.6 G/DL (ref 3.5–5.2)
ALBUMIN/GLOB SERPL: 1.6 G/DL
ALP SERPL-CCNC: 91 U/L (ref 39–117)
ALT SERPL-CCNC: 12 U/L (ref 1–41)
AST SERPL-CCNC: 20 U/L (ref 1–40)
BASOPHILS # BLD AUTO: 0.06 10*3/MM3 (ref 0–0.2)
BASOPHILS NFR BLD AUTO: 0.6 % (ref 0–1.5)
BILIRUB SERPL-MCNC: 0.4 MG/DL (ref 0.2–1.2)
BUN SERPL-MCNC: 10 MG/DL (ref 6–20)
BUN/CREAT SERPL: 10.2 (ref 7–25)
CALCIUM SERPL-MCNC: 9.5 MG/DL (ref 8.6–10.5)
CHLORIDE SERPL-SCNC: 103 MMOL/L (ref 98–107)
CHOLEST SERPL-MCNC: 171 MG/DL (ref 0–200)
CO2 SERPL-SCNC: 23.6 MMOL/L (ref 22–29)
CREAT SERPL-MCNC: 0.98 MG/DL (ref 0.76–1.27)
EOSINOPHIL # BLD AUTO: 0.25 10*3/MM3 (ref 0–0.4)
EOSINOPHIL NFR BLD AUTO: 2.5 % (ref 0.3–6.2)
ERYTHROCYTE [DISTWIDTH] IN BLOOD BY AUTOMATED COUNT: 12.3 % (ref 12.3–15.4)
GLOBULIN SER CALC-MCNC: 2.8 GM/DL
GLUCOSE SERPL-MCNC: 99 MG/DL (ref 65–99)
HBA1C MFR BLD: 5.8 % (ref 4.8–5.6)
HCT VFR BLD AUTO: 44.8 % (ref 37.5–51)
HDLC SERPL-MCNC: 50 MG/DL (ref 40–60)
HGB BLD-MCNC: 15.2 G/DL (ref 13–17.7)
IMM GRANULOCYTES # BLD AUTO: 0.12 10*3/MM3 (ref 0–0.05)
IMM GRANULOCYTES NFR BLD AUTO: 1.2 % (ref 0–0.5)
LDLC SERPL CALC-MCNC: 101 MG/DL (ref 0–100)
LYMPHOCYTES # BLD AUTO: 2.26 10*3/MM3 (ref 0.7–3.1)
LYMPHOCYTES NFR BLD AUTO: 23 % (ref 19.6–45.3)
MCH RBC QN AUTO: 30.6 PG (ref 26.6–33)
MCHC RBC AUTO-ENTMCNC: 33.9 G/DL (ref 31.5–35.7)
MCV RBC AUTO: 90.1 FL (ref 79–97)
MONOCYTES # BLD AUTO: 0.88 10*3/MM3 (ref 0.1–0.9)
MONOCYTES NFR BLD AUTO: 8.9 % (ref 5–12)
NEUTROPHILS # BLD AUTO: 6.27 10*3/MM3 (ref 1.7–7)
NEUTROPHILS NFR BLD AUTO: 63.8 % (ref 42.7–76)
NRBC BLD AUTO-RTO: 0 /100 WBC (ref 0–0.2)
PLATELET # BLD AUTO: 288 10*3/MM3 (ref 140–450)
POTASSIUM SERPL-SCNC: 5.5 MMOL/L (ref 3.5–5.2)
PROT SERPL-MCNC: 7.4 G/DL (ref 6–8.5)
PSA SERPL-MCNC: 0.59 NG/ML (ref 0–4)
RBC # BLD AUTO: 4.97 10*6/MM3 (ref 4.14–5.8)
SODIUM SERPL-SCNC: 141 MMOL/L (ref 136–145)
TRIGL SERPL-MCNC: 98 MG/DL (ref 0–150)
TSH SERPL DL<=0.005 MIU/L-ACNC: 2.21 UIU/ML (ref 0.27–4.2)
URATE SERPL-MCNC: 5.8 MG/DL (ref 3.4–7)
VLDLC SERPL CALC-MCNC: 19.6 MG/DL
WBC # BLD AUTO: 9.84 10*3/MM3 (ref 3.4–10.8)

## 2020-02-07 DIAGNOSIS — E87.5 HYPERKALEMIA: Primary | ICD-10-CM

## 2020-02-10 ENCOUNTER — RESULTS ENCOUNTER (OUTPATIENT)
Dept: FAMILY MEDICINE CLINIC | Facility: CLINIC | Age: 60
End: 2020-02-10

## 2020-02-10 ENCOUNTER — LAB REQUISITION (OUTPATIENT)
Dept: LAB | Facility: HOSPITAL | Age: 60
End: 2020-02-10

## 2020-02-10 DIAGNOSIS — E87.5 HYPERKALEMIA: ICD-10-CM

## 2020-02-10 DIAGNOSIS — Z00.00 ROUTINE GENERAL MEDICAL EXAMINATION AT A HEALTH CARE FACILITY: ICD-10-CM

## 2020-02-10 PROBLEM — R73.02 IMPAIRED GLUCOSE TOLERANCE: Status: ACTIVE | Noted: 2020-02-10

## 2020-02-10 LAB — POTASSIUM BLD-SCNC: 4.3 MMOL/L (ref 3.5–5.2)

## 2020-02-10 PROCEDURE — 36415 COLL VENOUS BLD VENIPUNCTURE: CPT | Performed by: PHYSICIAN ASSISTANT

## 2020-02-10 PROCEDURE — 84132 ASSAY OF SERUM POTASSIUM: CPT | Performed by: PHYSICIAN ASSISTANT

## 2020-02-11 ENCOUNTER — TELEPHONE (OUTPATIENT)
Dept: FAMILY MEDICINE CLINIC | Facility: CLINIC | Age: 60
End: 2020-02-11

## 2020-02-11 NOTE — TELEPHONE ENCOUNTER
----- Message from ABDIEL Ratliff sent at 2/10/2020  5:17 PM EST -----  Please call Antonio Karolyn and let him know his potassium is now normal. No specific treatment is needed for this.

## 2020-02-24 ENCOUNTER — TELEPHONE (OUTPATIENT)
Dept: FAMILY MEDICINE CLINIC | Facility: CLINIC | Age: 60
End: 2020-02-24

## 2020-02-24 RX ORDER — ROSUVASTATIN CALCIUM 20 MG/1
20 TABLET, COATED ORAL DAILY
Qty: 90 TABLET | Refills: 3 | Status: CANCELLED | OUTPATIENT
Start: 2020-02-24

## 2020-02-24 NOTE — TELEPHONE ENCOUNTER
1 year supply was sent in on 2/3/20. LVM informing pt of this. Advised him to call back with any questions.

## 2020-02-24 NOTE — TELEPHONE ENCOUNTER
PT CALLED REQUESTING 90 DAY SUPPLY OF rosuvastatin (CRESTOR) 20 MG tablet.    PHARMACY VERIFIED    PT CALL BACK [phone]

## 2020-03-23 LAB — POTASSIUM SERPL-SCNC: 4.3 MMOL/L (ref 3.5–5.2)

## 2020-04-27 ENCOUNTER — TELEPHONE (OUTPATIENT)
Dept: FAMILY MEDICINE CLINIC | Facility: CLINIC | Age: 60
End: 2020-04-27

## 2020-04-27 NOTE — TELEPHONE ENCOUNTER
PT CALLED IN REQUESTING A REFILL FOR A MEDICATION THAT WAS PRESCRIBED TO TREAT HIS  GOUT IN THE PAST PT FURTHER STATES HE IS CURRENTLY HAVING A FLARE UP PLEASE ADVISE      PT CB NUMBER: [phone]  [facility]: KRISTIAN OLVERA  [phone]

## 2020-04-28 RX ORDER — COLCHICINE 0.6 MG/1
TABLET ORAL
Qty: 3 TABLET | Refills: 0 | Status: CANCELLED | OUTPATIENT
Start: 2020-04-28

## 2020-04-29 ENCOUNTER — TELEMEDICINE (OUTPATIENT)
Dept: FAMILY MEDICINE CLINIC | Facility: CLINIC | Age: 60
End: 2020-04-29

## 2020-04-29 DIAGNOSIS — M10.9 ACUTE GOUT OF LEFT FOOT, UNSPECIFIED CAUSE: Primary | ICD-10-CM

## 2020-04-29 PROCEDURE — 99213 OFFICE O/P EST LOW 20 MIN: CPT | Performed by: PHYSICIAN ASSISTANT

## 2020-04-29 RX ORDER — COLCHICINE 0.6 MG/1
TABLET ORAL
Qty: 3 TABLET | Refills: 1 | Status: SHIPPED | OUTPATIENT
Start: 2020-04-29 | End: 2020-08-10

## 2020-04-29 NOTE — PROGRESS NOTES
Chief Complaint   Patient presents with   • Gout       HPI     Alex Parr is a pleasant 60 y.o. male who presents for a video visit. You have chosen to receive care through a telehealth visit.  Do you consent to use a video/audio connection for your medical care today? Yes.     He c/o left foot pain and slight swelling from the bottom of the big toe to the top of his ankle beginning 4 days ago. Felt similar to gout he has had in the past though not as severe. Pain was increased with walking. He took ibuprofen and his symptoms are now resolved. He had shrimp a couple nights before his symptoms started. Drinks red wine a couple glasses/night. He quit smoking 3 months ago.     Past Medical History:   Diagnosis Date   • Gout    • Hyperlipidemia    • Hypertension        Past Surgical History:   Procedure Laterality Date   • INGUINAL HERNIA REPAIR  2001       Family History   Problem Relation Age of Onset   • No Known Problems Mother    • Parkinsonism Father 80   • Hypertension Father        Social History     Socioeconomic History   • Marital status:      Spouse name: Not on file   • Number of children: Not on file   • Years of education: Not on file   • Highest education level: Not on file   Tobacco Use   • Smoking status: Current Every Day Smoker     Packs/day: 0.50     Years: 35.00     Pack years: 17.50     Types: Cigarettes   • Smokeless tobacco: Never Used   • Tobacco comment: 1/2 ppd current   Substance and Sexual Activity   • Alcohol use: Yes     Alcohol/week: 14.0 standard drinks     Types: 14 Glasses of wine per week     Comment: 2 glasses of wine/night   • Drug use: No   • Sexual activity: Never       No Known Allergies    ROS    Review of Systems   Musculoskeletal: Positive for arthralgias and joint swelling.       There were no vitals filed for this visit.  There is no height or weight on file to calculate BMI.      Current Outpatient Medications:   •  colchicine 0.6 MG tablet, Take 2 tabs po x  1 dose, then 1 tab po 1 hour later, Disp: 3 tablet, Rfl: 0  •  rosuvastatin (CRESTOR) 20 MG tablet, Take 1 tablet by mouth Daily., Disp: 90 tablet, Rfl: 3    PE    Physical Exam   Constitutional: He appears well-developed and well-nourished. No distress.   Pulmonary/Chest: Effort normal. No respiratory distress.   Neurological: He is alert.   Psychiatric: He has a normal mood and affect. His behavior is normal.        A/P    Problem List Items Addressed This Visit        Other    Gout - Primary    Overview     Unable to complete visit using a video connection to the patient. A phone visit was used to complete this visits. Total time of discussion was 10 minutes  -Discussed low purine diet and [email or web address] improvements  -Minimize alcohol inake  -No current symptoms  -Uric acid 5.8 in February. Will prescribe colchicine for patient to use prn. If he has additional flares, he will keep me informed so we can monitor his uric acid again             Plan of care was reviewed with patient at the conclusion of today's visit. Education was provided regarding diagnoses, management, prescribed or recommended OTC products, and the importance of compliance with follow-up appointments. The patient was counseled regarding the risks, benefits, and possible side-effects of treatment. I advised the patient to keep me informed of any acute changes in their status including new, worsening, or persistent symptoms. Patient expresses understanding and agreement with the management plan.        ABDIEL Ratliff

## 2020-04-29 NOTE — PATIENT INSTRUCTIONS
Low-Purine Eating Plan  A low-purine eating plan involves making food choices to limit your intake of purine. Purine is a kind of uric acid. Too much uric acid in your blood can cause certain conditions, such as gout and kidney stones. Eating a low-purine diet can help control these conditions.  What are tips for following this plan?  Reading food labels    · Avoid foods with saturated or Trans fat.  · Check the ingredient list of grains-based foods, such as bread and cereal, to make sure that they contain whole grains.  · Check the ingredient list of sauces or soups to make sure they do not contain meat or fish.  · When choosing soft drinks, check the ingredient list to make sure they do not contain high-fructose corn syrup.  Shopping  · Buy plenty of fresh fruits and vegetables.  · Avoid buying canned or fresh fish.  · Buy dairy products labeled as low-fat or nonfat.  · Avoid buying premade or processed foods. These foods are often high in fat, salt (sodium), and added sugar.  Cooking  · Use olive oil instead of butter when cooking. Oils like olive oil, canola oil, and sunflower oil contain healthy fats.  Meal planning  · Learn which foods do or do not affect you. If you find out that a food tends to cause your gout symptoms to flare up, avoid eating that food. You can enjoy foods that do not cause problems. If you have any questions about a food item, talk with your dietitian or health care provider.  · Limit foods high in fat, especially saturated fat. Fat makes it harder for your body to get rid of uric acid.  · Choose foods that are lower in fat and are lean sources of protein.  General guidelines  · Limit alcohol intake to no more than 1 drink a day for nonpregnant women and 2 drinks a day for men. One drink equals 12 oz of beer, 5 oz of wine, or 1½ oz of hard liquor. Alcohol can affect the way your body gets rid of uric acid.  · Drink plenty of water to keep your urine clear or pale yellow. Fluids can help  remove uric acid from your body.  · If directed by your health care provider, take a vitamin C supplement.  · Work with your health care provider and dietitian to develop a plan to achieve or maintain a healthy weight. Losing weight can help reduce uric acid in your blood.  What foods are recommended?  The items listed may not be a complete list. Talk with your dietitian about what dietary choices are best for you.  Foods low in purines  Foods low in purines do not need to be limited. These include:  · All fruits.  · All low-purine vegetables, pickles, and olives.  · Breads, pasta, rice, cornbread, and popcorn. Cake and other baked goods.  · All dairy foods.  · Eggs, nuts, and nut butters.  · Spices and condiments, such as salt, herbs, and vinegar.  · Plant oils, butter, and margarine.  · Water, sugar-free soft drinks, tea, coffee, and cocoa.  · Vegetable-based soups, broths, sauces, and gravies.  Foods moderate in purines  Foods moderate in purines should be limited to the amounts listed.  · ½ cup of asparagus, cauliflower, spinach, mushrooms, or green peas, each day.  · 2/3 cup uncooked oatmeal, each day.  · ¼ cup dry wheat bran or wheat germ, each day.  · 2-3 ounces of meat or poultry, each day.  · 4-6 ounces of shellfish, such as crab, lobster, oysters, or shrimp, each day.  · 1 cup cooked beans, peas, or lentils, each day.  · Soup, broths, or bouillon made from meat or fish. Limit these foods as much as possible.  What foods are not recommended?  The items listed may not be a complete list. Talk with your dietitian about what dietary choices are best for you.  Limit your intake of foods high in purines, including:  · Beer and other alcohol.  · Meat-based gravy or sauce.  · Canned or fresh fish, such as:  ? Anchovies, sardines, herring, and tuna.  ? Mussels and scallops.  ? Codfish, trout, and jacinto.  · Lafleur.  · Organ meats, such as:  ? Liver or kidney.  ? Tripe.  ? Sweetbreads (thymus gland or  pancreas).  · Wild game or goose.  · Yeast or yeast extract supplements.  · Drinks sweetened with high-fructose corn syrup.  Summary  · Eating a low-purine diet can help control conditions caused by too much uric acid in the body, such as gout or kidney stones.  · Choose low-purine foods, limit alcohol, and limit foods high in fat.  · You will learn over time which foods do or do not affect you. If you find out that a food tends to cause your gout symptoms to flare up, avoid eating that food.  This information is not intended to replace advice given to you by your health care provider. Make sure you discuss any questions you have with your health care provider.  Document Released: 04/13/2012 Document Revised: 01/31/2018 Document Reviewed: 01/31/2018  [employer] Interactive Patient Education © 2020 [employer]

## 2020-08-10 ENCOUNTER — OFFICE VISIT (OUTPATIENT)
Dept: FAMILY MEDICINE CLINIC | Facility: CLINIC | Age: 60
End: 2020-08-10

## 2020-08-10 VITALS
HEIGHT: 72 IN | BODY MASS INDEX: 27.63 KG/M2 | SYSTOLIC BLOOD PRESSURE: 140 MMHG | OXYGEN SATURATION: 97 % | DIASTOLIC BLOOD PRESSURE: 94 MMHG | WEIGHT: 204 LBS | HEART RATE: 85 BPM

## 2020-08-10 DIAGNOSIS — R49.0 VOICE HOARSENESS: ICD-10-CM

## 2020-08-10 DIAGNOSIS — I10 ESSENTIAL HYPERTENSION: Primary | ICD-10-CM

## 2020-08-10 PROCEDURE — 99213 OFFICE O/P EST LOW 20 MIN: CPT | Performed by: PHYSICIAN ASSISTANT

## 2020-08-10 PROCEDURE — 93000 ELECTROCARDIOGRAM COMPLETE: CPT | Performed by: PHYSICIAN ASSISTANT

## 2020-08-10 RX ORDER — FAMOTIDINE 20 MG/1
20 TABLET, FILM COATED ORAL NIGHTLY
Qty: 30 TABLET | Refills: 0 | Status: SHIPPED | OUTPATIENT
Start: 2020-08-10 | End: 2021-02-22 | Stop reason: SDUPTHER

## 2020-08-10 RX ORDER — PRAVASTATIN SODIUM 40 MG
40 TABLET ORAL DAILY
COMMUNITY
End: 2020-08-10

## 2020-08-10 NOTE — PROGRESS NOTES
Chief Complaint   Patient presents with   • Hypertension     6 mth follow up       HPI     Alex Parr is a 60 y.o. male who is here for follow-up of hypertension. His BP was 116/86 this morning at home. Had been on low-dose lisinopril but it was discontinued due to improvement. It was high at a dentist's office recently. He has gained 27 pounds in 6 months which he attributes to a change in his diet since his wife retired and smoking cessation in January. Of note, he received a steroid injection for his left shoulder 2 weeks ago.    Has mild voice hoarseness in the mornings. Improves during the day. Some postnasal drip. Taking otc antihistamine daily. Takes rolaids for GERD 2-3 times/week.     Past Medical History:   Diagnosis Date   • Gout    • Hyperlipidemia    • Hypertension        Past Surgical History:   Procedure Laterality Date   • INGUINAL HERNIA REPAIR  2001       Family History   Problem Relation Age of Onset   • No Known Problems Mother    • Parkinsonism Father 80   • Hypertension Father        Social History     Socioeconomic History   • Marital status:      Spouse name: Not on file   • Number of children: Not on file   • Years of education: Not on file   • Highest education level: Not on file   Tobacco Use   • Smoking status: Current Every Day Smoker     Packs/day: 0.50     Years: 35.00     Pack years: 17.50     Types: Cigarettes   • Smokeless tobacco: Never Used   • Tobacco comment: 1/2 ppd current   Substance and Sexual Activity   • Alcohol use: Yes     Alcohol/week: 14.0 standard drinks     Types: 14 Glasses of wine per week     Comment: 2 glasses of wine/night   • Drug use: No   • Sexual activity: Never       No Known Allergies    ROS    Review of Systems   Respiratory: Negative for shortness of breath.    Cardiovascular: Negative for chest pain.   Neurological: Negative for dizziness and headache.       Vitals:    08/10/20 1215   BP: 140/94   Pulse:    SpO2:      Body mass index is 27.67  kg/m².      Current Outpatient Medications:   •  colchicine 0.6 MG tablet, Take 2 tabs po x 1 dose, then 1 tab po 1 hour later as needed for gout. Do not repeat dose for 3 days., Disp: 3 tablet, Rfl: 1  •  famotidine (PEPCID) 20 MG tablet, Take 1 tablet by mouth Every Night., Disp: 30 tablet, Rfl: 0  •  rosuvastatin (CRESTOR) 20 MG tablet, Take 1 tablet by mouth Daily., Disp: 90 tablet, Rfl: 3    PE    Physical Exam   Constitutional: He appears well-developed and well-nourished. No distress.   HENT:   Head: Normocephalic and atraumatic.   Eyes: Conjunctivae and EOM are normal.   Neck: Normal range of motion.   Cardiovascular: Normal rate, regular rhythm and normal heart sounds.   No murmur heard.  Pulmonary/Chest: Effort normal and breath sounds normal.   Neurological: He is alert. Gait normal.   Skin: Skin is warm and dry.   Psychiatric: He has a normal mood and affect. His speech is normal and behavior is normal.   Vitals reviewed.      Results    Results for orders placed or performed in visit on 02/10/20   Potassium   Result Value Ref Range    Potassium 4.3 3.5 - 5.2 mmol/L       ECG 12 Lead  Date/Time: 8/10/2020 12:32 PM  Performed by: Zaid Pena PA  Authorized by: Zaid Pena PA   Comparison: compared with previous ECG from 6/13/2016  Similar to previous ECG  Comparison to previous ECG: Sinus rhythm replaces sinus bradycardia  Rhythm: sinus rhythm  Rate: normal  BPM: 73  Conduction: conduction normal  ST Segments: ST segments normal  T Waves: T waves normal    Clinical impression: normal ECG            A/P    Problem List Items Addressed This Visit        Cardiovascular and Mediastinum    Hypertension - Primary  -Elevated today and at recent dental appointment  -Normal per home measurements  -RTC in 2 weeks with home BP cuff and readings  -Discussed DASH diet, handout provided    Relevant Orders    ECG 12 Lead      Other Visit Diagnoses     Voice hoarseness      -Continue otc antihistamine  -Trial  pepcid [facility]  -Counseled patient to call if symptoms persist          Plan of care was reviewed with patient at the conclusion of today's visit. Education was provided regarding diagnoses, management, and the importance of keeping follow-up appointments. The patient was counseled regarding the risks, benefits, and possible side-effects of treatment. Patient and/or family express understanding and agreement with the management plan.        ABDIEL Ratliff

## 2020-08-10 NOTE — PATIENT INSTRUCTIONS
"-Monitor your blood pressure every other day and keep a written log for us to review at your next appointment  -Please bring your blood pressure cuff with you to your next visit  -Continue over the counter allergy medication  -I am sending an antacid prescription to your pharmacy for you to take in the evening    DASH Eating Plan  DASH stands for \"Dietary Approaches to Stop Hypertension.\" The DASH eating plan is a healthy eating plan that has been shown to reduce high blood pressure (hypertension). It may also reduce your risk for type 2 diabetes, heart disease, and stroke. The DASH eating plan may also help with weight loss.  What are tips for following this plan?    General guidelines  · Avoid eating more than 2,300 mg (milligrams) of salt (sodium) a day. If you have hypertension, you may need to reduce your sodium intake to 1,500 mg a day.  · Limit alcohol intake to no more than 1 drink a day for nonpregnant women and 2 drinks a day for men. One drink equals 12 oz of beer, 5 oz of wine, or 1½ oz of hard liquor.  · Work with your health care provider to maintain a healthy body weight or to lose weight. Ask what an ideal weight is for you.  · Get at least 30 minutes of exercise that causes your heart to beat faster (aerobic exercise) most days of the week. Activities may include walking, swimming, or biking.  · Work with your health care provider or diet and nutrition specialist (dietitian) to adjust your eating plan to your individual calorie needs.  Reading food labels    · Check food labels for the amount of sodium per serving. Choose foods with less than 5 percent of the Daily Value of sodium. Generally, foods with less than 300 mg of sodium per serving fit into this eating plan.  · To find whole grains, look for the word \"whole\" as the first word in the ingredient list.  Shopping  · Buy products labeled as \"low-sodium\" or \"no salt added.\"  · Buy fresh foods. Avoid canned foods and premade or frozen " meals.  Cooking  · Avoid adding salt when cooking. Use salt-free seasonings or herbs instead of table salt or sea salt. Check with your health care provider or pharmacist before using salt substitutes.  · Do not esparza foods. Cook foods using healthy methods such as baking, boiling, grilling, and broiling instead.  · Cook with heart-healthy oils, such as olive, canola, soybean, or sunflower oil.  Meal planning  · Eat a balanced diet that includes:  ? 5 or more servings of fruits and vegetables each day. At each meal, try to fill half of your plate with fruits and vegetables.  ? Up to 6-8 servings of whole grains each day.  ? Less than 6 oz of lean meat, poultry, or fish each day. A 3-oz serving of meat is about the same size as a deck of cards. One egg equals 1 oz.  ? 2 servings of low-fat dairy each day.  ? A serving of nuts, seeds, or beans 5 times each week.  ? Heart-healthy fats. Healthy fats called Omega-3 fatty acids are found in foods such as flaxseeds and coldwater fish, like sardines, salmon, and mackerel.  · Limit how much you eat of the following:  ? Canned or prepackaged foods.  ? Food that is high in trans fat, such as fried foods.  ? Food that is high in saturated fat, such as fatty meat.  ? Sweets, desserts, sugary drinks, and other foods with added sugar.  ? Full-fat dairy products.  · Do not salt foods before eating.  · Try to eat at least 2 vegetarian meals each week.  · Eat more home-cooked food and less restaurant, buffet, and fast food.  · When eating at a restaurant, ask that your food be prepared with less salt or no salt, if possible.  What foods are recommended?  The items listed may not be a complete list. Talk with your dietitian about what dietary choices are best for you.  Grains  Whole-grain or whole-wheat bread. Whole-grain or whole-wheat pasta. Brown rice. Oatmeal. Quinoa. Bulgur. Whole-grain and low-sodium cereals. Melany bread. Low-fat, low-sodium crackers. Whole-wheat flour  tortillas.  Vegetables  Fresh or frozen vegetables (raw, steamed, roasted, or grilled). Low-sodium or reduced-sodium tomato and vegetable juice. Low-sodium or reduced-sodium tomato sauce and tomato paste. Low-sodium or reduced-sodium canned vegetables.  Fruits  All fresh, dried, or frozen fruit. Canned fruit in natural juice (without added sugar).  Meat and other protein foods  Skinless chicken or turkey. Ground chicken or turkey. Pork with fat trimmed off. Fish and seafood. Egg whites. Dried beans, peas, or lentils. Unsalted nuts, nut butters, and seeds. Unsalted canned beans. Lean cuts of beef with fat trimmed off. Low-sodium, lean deli meat.  Dairy  Low-fat (1%) or fat-free (skim) milk. Fat-free, low-fat, or reduced-fat cheeses. Nonfat, low-sodium ricotta or cottage cheese. Low-fat or nonfat yogurt. Low-fat, low-sodium cheese.  Fats and oils  Soft margarine without trans fats. Vegetable oil. Low-fat, reduced-fat, or light mayonnaise and salad dressings (reduced-sodium). Canola, safflower, olive, soybean, and sunflower oils. Avocado.  Seasoning and other foods  Herbs. Spices. Seasoning mixes without salt. Unsalted popcorn and pretzels. Fat-free sweets.  What foods are not recommended?  The items listed may not be a complete list. Talk with your dietitian about what dietary choices are best for you.  Grains  Baked goods made with fat, such as croissants, muffins, or some breads. Dry pasta or rice meal packs.  Vegetables  Creamed or fried vegetables. Vegetables in a cheese sauce. Regular canned vegetables (not low-sodium or reduced-sodium). Regular canned tomato sauce and paste (not low-sodium or reduced-sodium). Regular tomato and vegetable juice (not low-sodium or reduced-sodium). Pickles. Olives.  Fruits  Canned fruit in a light or heavy syrup. Fried fruit. Fruit in cream or butter sauce.  Meat and other protein foods  Fatty cuts of meat. Ribs. Fried meat. Lafleur. Sausage. Bologna and other processed lunch meats.  Salami. Fatback. Hotdogs. Bratwurst. Salted nuts and seeds. Canned beans with added salt. Canned or smoked fish. Whole eggs or egg yolks. Chicken or turkey with skin.  Dairy  Whole or 2% milk, cream, and half-and-half. Whole or full-fat cream cheese. Whole-fat or sweetened yogurt. Full-fat cheese. Nondairy creamers. Whipped toppings. Processed cheese and cheese spreads.  Fats and oils  Butter. Stick margarine. Lard. Shortening. Ghee. Lafleur fat. Tropical oils, such as coconut, palm kernel, or palm oil.  Seasoning and other foods  Salted popcorn and pretzels. Onion salt, garlic salt, seasoned salt, table salt, and sea salt. Worcestershire sauce. Tartar sauce. Barbecue sauce. Teriyaki sauce. Soy sauce, including reduced-sodium. Steak sauce. Canned and packaged gravies. Fish sauce. Oyster sauce. Cocktail sauce. Horseradish that you find on the shelf. Ketchup. Mustard. Meat flavorings and tenderizers. Bouillon cubes. Hot sauce and Tabasco sauce. Premade or packaged marinades. Premade or packaged taco seasonings. Relishes. Regular salad dressings.  Where to find more information:  · National Heart, Lung, and Blood [place]: www.nhlbi.nih.gov  · American Heart Association: www.heart.org  Summary  · The DASH eating plan is a healthy eating plan that has been shown to reduce high blood pressure (hypertension). It may also reduce your risk for type 2 diabetes, heart disease, and stroke.  · With the DASH eating plan, you should limit salt (sodium) intake to 2,300 mg a day. If you have hypertension, you may need to reduce your sodium intake to 1,500 mg a day.  · When on the DASH eating plan, aim to eat more fresh fruits and vegetables, whole grains, lean proteins, low-fat dairy, and heart-healthy fats.  · Work with your health care provider or diet and nutrition specialist (dietitian) to adjust your eating plan to your individual calorie needs.  This information is not intended to replace advice given to you by your health  care provider. Make sure you discuss any questions you have with your health care provider.  Document Released: 12/06/2012 Document Revised: 11/30/2018 Document Reviewed: 12/11/2017  Elsevier Patient Education © 2020 [employer]

## 2020-08-24 ENCOUNTER — OFFICE VISIT (OUTPATIENT)
Dept: FAMILY MEDICINE CLINIC | Facility: CLINIC | Age: 60
End: 2020-08-24

## 2020-08-24 VITALS
HEIGHT: 72 IN | OXYGEN SATURATION: 99 % | BODY MASS INDEX: 27.63 KG/M2 | WEIGHT: 204 LBS | HEART RATE: 96 BPM | DIASTOLIC BLOOD PRESSURE: 98 MMHG | SYSTOLIC BLOOD PRESSURE: 138 MMHG

## 2020-08-24 DIAGNOSIS — I10 ESSENTIAL HYPERTENSION: Primary | ICD-10-CM

## 2020-08-24 PROCEDURE — 99213 OFFICE O/P EST LOW 20 MIN: CPT | Performed by: PHYSICIAN ASSISTANT

## 2020-08-24 RX ORDER — LISINOPRIL 5 MG/1
5 TABLET ORAL DAILY
Qty: 90 TABLET | Refills: 1 | Status: SHIPPED | OUTPATIENT
Start: 2020-08-24 | End: 2021-02-19

## 2020-08-24 NOTE — PROGRESS NOTES
Chief Complaint   Patient presents with   • Hypertension       HPI     Alex Parr is a 60 y.o. male who is here for follow-up of hypertension. He brings his his blood pressure cuff and log today. DBP readings at home have been in the 80-90s. He used to take lisinopril 2.5 mg daily but this was discontinued due to improved blood pressure. He has gained 27 pounds in 6 months. He one headache when his blood pressure was high since his last visit but denies recurrent headaches.     Past Medical History:   Diagnosis Date   • Gout    • Hyperlipidemia    • Hypertension        Past Surgical History:   Procedure Laterality Date   • INGUINAL HERNIA REPAIR  2001       Family History   Problem Relation Age of Onset   • No Known Problems Mother    • Parkinsonism Father 80   • Hypertension Father        Social History     Socioeconomic History   • Marital status:      Spouse name: Not on file   • Number of children: Not on file   • Years of education: Not on file   • Highest education level: Not on file   Tobacco Use   • Smoking status: Current Every Day Smoker     Packs/day: 0.50     Years: 35.00     Pack years: 17.50     Types: Cigarettes   • Smokeless tobacco: Never Used   • Tobacco comment: 1/2 ppd current   Substance and Sexual Activity   • Alcohol use: Yes     Alcohol/week: 14.0 standard drinks     Types: 14 Glasses of wine per week     Comment: 2 glasses of wine/night   • Drug use: No   • Sexual activity: Never       No Known Allergies    ROS    Review of Systems   Respiratory: Negative for shortness of breath.    Cardiovascular: Negative for chest pain.   Neurological: Positive for headache. Negative for dizziness.       Vitals:    08/24/20 1227   BP: 138/98   Pulse: 96   SpO2: 99%     Body mass index is 27.67 kg/m².      Current Outpatient Medications:   •  famotidine (PEPCID) 20 MG tablet, Take 1 tablet by mouth Every Night., Disp: 30 tablet, Rfl: 0  •  rosuvastatin (CRESTOR) 20 MG tablet, Take 1 tablet by  mouth Daily., Disp: 90 tablet, Rfl: 3  •  lisinopril (PRINIVIL,ZESTRIL) 5 MG tablet, Take 1 tablet by mouth Daily., Disp: 90 tablet, Rfl: 1    PE    Physical Exam   Constitutional: He appears well-developed and well-nourished. No distress.   Pulmonary/Chest: Effort normal. No respiratory distress.   Neurological: He is alert.   Psychiatric: He has a normal mood and affect.       Results    Results for orders placed or performed in visit on 02/10/20   Potassium   Result Value Ref Range    Potassium 4.3 3.5 - 5.2 mmol/L       A/P    Problem List Items Addressed This Visit        Cardiovascular and Mediastinum    Hypertension - Primary  -Home cuff today: 117/91; office cuff 120/92  -Restart lisinopril at 5 mg qd  -RTC in 1 month for blood pressure check    Relevant Medications    lisinopril (PRINIVIL,ZESTRIL) 5 MG tablet          Plan of care was reviewed with patient at the conclusion of today's visit. Education was provided regarding diagnoses, management, and the importance of keeping follow-up appointments. The patient was counseled regarding the risks, benefits, and possible side-effects of treatment. Patient and/or family express understanding and agreement with the management plan.        ABDIEL Ratliff

## 2020-08-24 NOTE — PATIENT INSTRUCTIONS
Monitor your blood pressure every other day and keep a written log for us to review at your next appointment

## 2020-09-21 ENCOUNTER — OFFICE VISIT (OUTPATIENT)
Dept: FAMILY MEDICINE CLINIC | Facility: CLINIC | Age: 60
End: 2020-09-21

## 2020-09-21 VITALS
BODY MASS INDEX: 28.17 KG/M2 | SYSTOLIC BLOOD PRESSURE: 120 MMHG | OXYGEN SATURATION: 98 % | WEIGHT: 208 LBS | HEART RATE: 84 BPM | DIASTOLIC BLOOD PRESSURE: 82 MMHG | HEIGHT: 72 IN

## 2020-09-21 DIAGNOSIS — Z23 NEEDS FLU SHOT: ICD-10-CM

## 2020-09-21 DIAGNOSIS — I10 ESSENTIAL HYPERTENSION: Primary | ICD-10-CM

## 2020-09-21 PROCEDURE — 99213 OFFICE O/P EST LOW 20 MIN: CPT | Performed by: PHYSICIAN ASSISTANT

## 2020-09-21 PROCEDURE — 90471 IMMUNIZATION ADMIN: CPT | Performed by: PHYSICIAN ASSISTANT

## 2020-09-21 PROCEDURE — 90686 IIV4 VACC NO PRSV 0.5 ML IM: CPT | Performed by: PHYSICIAN ASSISTANT

## 2020-09-21 NOTE — PROGRESS NOTES
Chief Complaint   Patient presents with   • Hypertension       HPI     Alex Parr is a 60 y.o. male who is here for 1 month  follow-up of hypertension.  He brings in a record of his blood pressures from home that shows readings ranging from 104-119 systolic and 78-93 diastolic with averages in the 110s/80s. Tolerating lisinopril well. He has gained weight since he quit smoking and his wife increased cooking from home. 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     Past Medical History:   Diagnosis Date   • Gout    • Hyperlipidemia    • Hypertension        Past Surgical History:   Procedure Laterality Date   • INGUINAL HERNIA REPAIR  2001       Family History   Problem Relation Age of Onset   • No Known Problems Mother    • Parkinsonism Father 80   • Hypertension Father        Social History     Socioeconomic History   • Marital status:      Spouse name: Not on file   • Number of children: Not on file   • Years of education: Not on file   • Highest education level: Not on file   Tobacco Use   • Smoking status: Current Every Day Smoker     Packs/day: 0.50     Years: 35.00     Pack years: 17.50     Types: Cigarettes   • Smokeless tobacco: Never Used   • Tobacco comment: 1/2 ppd current   Substance and Sexual Activity   • Alcohol use: Yes     Alcohol/week: 14.0 standard drinks     Types: 14 Glasses of wine per week     Comment: 2 glasses of wine/night   • Drug use: No   • Sexual activity: Never       No Known Allergies    ROS    Review of Systems   Constitutional: Negative for fatigue.   Respiratory: Negative for shortness of breath.    Cardiovascular: Negative for chest pain.   Neurological: Negative for dizziness and headache.       Vitals:    09/21/20 1200   BP: 120/82   Pulse: 84   SpO2: 98%     Body mass index is 28.2 kg/m².      Current Outpatient Medications:   •  famotidine (PEPCID) 20 MG tablet, Take 1 tablet by mouth Every Night., Disp: 30 tablet, Rfl: 0  •  lisinopril  (PRINIVIL,ZESTRIL) 5 MG tablet, Take 1 tablet by mouth Daily., Disp: 90 tablet, Rfl: 1  •  rosuvastatin (CRESTOR) 20 MG tablet, Take 1 tablet by mouth Daily., Disp: 90 tablet, Rfl: 3    PE    Physical Exam  Vitals signs reviewed.   Constitutional:       General: He is not in acute distress.     Appearance: He is well-developed.   HENT:      Head: Normocephalic and atraumatic.   Eyes:      Conjunctiva/sclera: Conjunctivae normal.   Neck:      Musculoskeletal: Normal range of motion.   Cardiovascular:      Rate and Rhythm: Normal rate and regular rhythm.      Heart sounds: Normal heart sounds. No murmur.   Pulmonary:      Effort: Pulmonary effort is normal.      Breath sounds: Normal breath sounds.   Skin:     General: Skin is warm and dry.   Neurological:      Mental Status: He is alert.      Gait: Gait normal.   Psychiatric:         Speech: Speech normal.         Behavior: Behavior normal.         Results    Results for orders placed or performed in visit on 02/10/20   Potassium   Result Value Ref Range    Potassium 4.3 3.5 - 5.2 mmol/L       A/P    Problem List Items Addressed This Visit        Cardiovascular and Mediastinum    Hypertension - Primary  -Controlled  -Continue lisinopril  -RTC in 5 months for physical      Other Visit Diagnoses     Needs flu shot        Relevant Orders    Fluarix Quad >6 Months (VFC) ([id number]) (Completed)          Plan of care was reviewed with patient at the conclusion of today's visit. Education was provided regarding diagnoses, management, and the importance of keeping follow-up appointments. The patient was counseled regarding the risks, benefits, and possible side-effects of treatment. Patient and/or family express understanding and agreement with the management plan.        ABDIEL Ratliff

## 2021-02-19 RX ORDER — LISINOPRIL 5 MG/1
TABLET ORAL
Qty: 90 TABLET | Refills: 0 | Status: SHIPPED | OUTPATIENT
Start: 2021-02-19 | End: 2021-02-22 | Stop reason: SDUPTHER

## 2021-02-22 ENCOUNTER — OFFICE VISIT (OUTPATIENT)
Dept: FAMILY MEDICINE CLINIC | Facility: CLINIC | Age: 61
End: 2021-02-22

## 2021-02-22 VITALS
WEIGHT: 193.2 LBS | OXYGEN SATURATION: 97 % | DIASTOLIC BLOOD PRESSURE: 74 MMHG | HEART RATE: 82 BPM | HEIGHT: 72 IN | BODY MASS INDEX: 26.17 KG/M2 | SYSTOLIC BLOOD PRESSURE: 124 MMHG

## 2021-02-22 DIAGNOSIS — R73.02 IMPAIRED GLUCOSE TOLERANCE: ICD-10-CM

## 2021-02-22 DIAGNOSIS — K57.30 DIVERTICULOSIS OF LARGE INTESTINE WITHOUT HEMORRHAGE: ICD-10-CM

## 2021-02-22 DIAGNOSIS — E55.9 VITAMIN D DEFICIENCY: ICD-10-CM

## 2021-02-22 DIAGNOSIS — I10 ESSENTIAL HYPERTENSION: ICD-10-CM

## 2021-02-22 DIAGNOSIS — Z86.010 HISTORY OF COLONIC POLYPS: ICD-10-CM

## 2021-02-22 DIAGNOSIS — G56.03 BILATERAL CARPAL TUNNEL SYNDROME: ICD-10-CM

## 2021-02-22 DIAGNOSIS — Z12.11 ENCOUNTER FOR COLORECTAL CANCER SCREENING: ICD-10-CM

## 2021-02-22 DIAGNOSIS — Z00.00 ROUTINE MEDICAL EXAM: Primary | ICD-10-CM

## 2021-02-22 DIAGNOSIS — K21.9 GASTROESOPHAGEAL REFLUX DISEASE, UNSPECIFIED WHETHER ESOPHAGITIS PRESENT: ICD-10-CM

## 2021-02-22 DIAGNOSIS — M10.9 GOUT INVOLVING TOE OF RIGHT FOOT, UNSPECIFIED CAUSE, UNSPECIFIED CHRONICITY: ICD-10-CM

## 2021-02-22 DIAGNOSIS — E78.5 HYPERLIPIDEMIA, UNSPECIFIED HYPERLIPIDEMIA TYPE: ICD-10-CM

## 2021-02-22 DIAGNOSIS — Z12.5 SCREENING FOR MALIGNANT NEOPLASM OF PROSTATE: ICD-10-CM

## 2021-02-22 DIAGNOSIS — Z12.12 ENCOUNTER FOR COLORECTAL CANCER SCREENING: ICD-10-CM

## 2021-02-22 PROCEDURE — 99396 PREV VISIT EST AGE 40-64: CPT | Performed by: PHYSICIAN ASSISTANT

## 2021-02-22 RX ORDER — LISINOPRIL 5 MG/1
5 TABLET ORAL DAILY
Qty: 90 TABLET | Refills: 3 | Status: SHIPPED | OUTPATIENT
Start: 2021-02-22 | End: 2022-03-07 | Stop reason: SDUPTHER

## 2021-02-22 RX ORDER — MELATONIN
2000 DAILY
COMMUNITY

## 2021-02-22 RX ORDER — ROSUVASTATIN CALCIUM 20 MG/1
20 TABLET, COATED ORAL DAILY
Qty: 90 TABLET | Refills: 3 | Status: SHIPPED | OUTPATIENT
Start: 2021-02-22 | End: 2021-02-26

## 2021-02-22 RX ORDER — FAMOTIDINE 20 MG/1
20 TABLET, FILM COATED ORAL NIGHTLY
Qty: 90 TABLET | Refills: 3 | Status: SHIPPED | OUTPATIENT
Start: 2021-02-22 | End: 2022-02-17

## 2021-02-22 NOTE — PROGRESS NOTES
Reason for visit    Alex Parr is a 61 y.o. male who presents for annual comprehensive exam.    Chief Complaint   Patient presents with   • Annual Exam   • Hyperlipidemia   • Hypertension       HPI     Here for physical. Patient reports no acute concerns.  No recent home BP readings. Compliant with lisinopril daily.     Diet/Physical activity:  -Healthy diet. He has cut out candy bars daily and has lost 15 pounds in 6 months  -He does a lot of walking and is physically active at work but does not otherwise exercise    Immunizations:  -Discussed shingrix and COVID-19 vaccines    Cancer screening:   -Does not qualify for lung cancer screening  -Colonoscopy due in July this year  -Denies family history of cancer    Depression: PHQ-2 Depression Screening  Little interest or pleasure in doing things? 0   Feeling down, depressed, or hopeless? 0   PHQ-2 Total Score 0      Substance use:  -Quit smoking last year, 1/2 ppd x 40 yrs  -2 glasses of wine/night  -No drug use  -1 diet pepsi/day    Sexual health:  -Monogamous relationship    AAA screen:  -Not yet indicated  -No family history AAA    Dental/vision/skin:  -Current with dental/vision/skin exams  -Sees dermatology    Past Medical History:   Diagnosis Date   • Gout    • Hyperlipidemia    • Hypertension        Past Surgical History:   Procedure Laterality Date   • INGUINAL HERNIA REPAIR  2001       Family History   Problem Relation Age of Onset   • No Known Problems Mother    • Parkinsonism Father 80   • Hypertension Father        Social History     Socioeconomic History   • Marital status:      Spouse name: Not on file   • Number of children: Not on file   • Years of education: Not on file   • Highest education level: Not on file   Tobacco Use   • Smoking status: Current Every Day Smoker     Packs/day: 0.50     Years: 35.00     Pack years: 17.50     Types: Cigarettes   • Smokeless tobacco: Never Used   • Tobacco comment: 1/2 ppd current   Substance and Sexual  Activity   • Alcohol use: Yes     Alcohol/week: 14.0 standard drinks     Types: 14 Glasses of wine per week     Comment: 2 glasses of wine/night   • Drug use: No   • Sexual activity: Never       No Known Allergies    ROS    Review of Systems   Constitutional: Negative for appetite change, chills, diaphoresis, fatigue, fever and unexpected weight loss.   HENT: Positive for hearing loss (gradual). Negative for congestion, sore throat, swollen glands and trouble swallowing.    Eyes: Negative for blurred vision and visual disturbance.   Respiratory: Negative for cough, choking, shortness of breath and wheezing.    Cardiovascular: Negative for chest pain, palpitations and leg swelling.   Gastrointestinal: Negative for abdominal pain, blood in stool, constipation, diarrhea and GERD.   Endocrine: Negative for polydipsia and polyuria.   Genitourinary: Negative for difficulty urinating, dysuria, hematuria, nocturia, erectile dysfunction, scrotal swelling and testicular pain.   Musculoskeletal: Positive for arthralgias (knees) and myalgias (leg cramps).   Skin: Negative for rash and skin lesions.   Neurological: Positive for numbness (nocturnal, hands). Negative for dizziness, syncope, weakness, headache and memory problem.   Hematological: Negative for adenopathy.   Psychiatric/Behavioral: Negative for sleep disturbance and depressed mood. The patient is not nervous/anxious.        Vitals:    02/22/21 1352   BP: 124/74   Pulse: 82   SpO2: 97%     Body mass index is 26.2 kg/m².      Current Outpatient Medications:   •  cholecalciferol (VITAMIN D3) 25 MCG (1000 UT) tablet, Take 2,000 Units by mouth Daily., Disp: , Rfl:   •  famotidine (PEPCID) 20 MG tablet, Take 1 tablet by mouth Every Night., Disp: 90 tablet, Rfl: 3  •  lisinopril (PRINIVIL,ZESTRIL) 5 MG tablet, Take 1 tablet by mouth Daily., Disp: 90 tablet, Rfl: 3  •  rosuvastatin (Crestor) 20 MG tablet, Take 1 tablet by mouth Daily., Disp: 90 tablet, Rfl:  3    PE    Physical Exam  Vitals signs reviewed.   Constitutional:       General: He is not in acute distress.     Appearance: He is well-developed.   HENT:      Head: Normocephalic and atraumatic.      Right Ear: Hearing and tympanic membrane normal.      Left Ear: Hearing and tympanic membrane normal.      Mouth/Throat:      Mouth: Mucous membranes are moist.      Dentition: Normal dentition.      Pharynx: Oropharynx is clear.   Eyes:      Extraocular Movements: Extraocular movements intact.      Conjunctiva/sclera: Conjunctivae normal.      Pupils: Pupils are equal, round, and reactive to light.      Comments:      Neck:      Musculoskeletal: Normal range of motion and neck supple.      Thyroid: No thyroid mass or thyromegaly.      Vascular: No carotid bruit.   Cardiovascular:      Rate and Rhythm: Normal rate and regular rhythm.      Heart sounds: No murmur. No friction rub.   Pulmonary:      Effort: Pulmonary effort is normal.      Breath sounds: Normal breath sounds.   Abdominal:      General: Bowel sounds are normal. There is no abdominal bruit.      Palpations: Abdomen is soft. There is no mass.      Tenderness: There is no abdominal tenderness.   Musculoskeletal: Normal range of motion.   Lymphadenopathy:      Cervical: No cervical adenopathy.      Upper Body:      Right upper body: No supraclavicular adenopathy.      Left upper body: No supraclavicular adenopathy.   Skin:     General: Skin is warm and dry.      Findings: No rash.      Nails: There is no clubbing.        Comments: No obvious suspicious nevi on clothed exam  Sees dermatology for full skin exams   Neurological:      Mental Status: He is alert.      Gait: Gait normal.      Comments: BLE reflexes difficult to elicit but symmetric   Psychiatric:         Speech: Speech normal.         Behavior: Behavior normal.         A/P    Problem List Items Addressed This Visit        Cardiac and Vasculature    Hypertension  -Controlled  -Continue lisinopril     Relevant Medications    lisinopril (PRINIVIL,ZESTRIL) 5 MG tablet    Hyperlipidemia  -Monitor fasting lipid profile  -On statin    Relevant Medications    rosuvastatin (Crestor) 20 MG tablet       Endocrine and Metabolic    Vitamin D deficiency    Overview     -1/19  -Taking daily supplement         Impaired glucose tolerance    Overview     -A1C: 5.8 - 2/2020  -Monitor A1c          Gastrointestinal Abdominal     History of colonic polyps    Overview     7/16 tubular adenoma, repeat colonoscopy 5 years         Diverticulosis of large intestine  -No current issues      Gastroesophageal reflux disease  -Currently asymptomatic    Relevant Medications    famotidine (PEPCID) 20 MG tablet       Musculoskeletal and Injuries    Gout    Overview     -1/19 uric acid 7.2 Low purine diet, limiting alcohol use, allopurinol advised  -No recent flares          Neuro    Bilateral carpal tunnel syndrome  -Mild, patient declines treatment at this time  -Discussed monitoring for worsening signs and symptoms      Other Visit Diagnoses     Routine medical exam    -  Primary  -Counseled patient regarding cancer screening, immunizations, healthy lifestyle, diet, maintaining a healthy weight, and exercise.   -Annual dental and eye exams were encouraged.  -Colonoscopy due this year. Order entered, schedule for July    Relevant Orders    CBC & Differential    Comprehensive Metabolic Panel    Lipid Panel    TSH Rfx On Abnormal To Free T4    Urinalysis With Culture If Indicated - Urine, Clean Catch    Uric acid    Vitamin D 25 Hydroxy    Encounter for colorectal cancer screening        Relevant Orders    Ambulatory Referral For Screening Colonoscopy (Completed)    Screening for malignant neoplasm of prostate        Relevant Orders    PSA Screen          Plan of care was reviewed with patient at the conclusion of today's visit. Education was provided regarding diagnoses, management, treatment plan, and the importance of keeping follow-up  appointments. The patient was counseled regarding the risks, benefits, and possible side-effects of treatment. Patient and/or family expresses understanding and agreement with the management plan.        ABDIEL Ratliff

## 2021-02-22 NOTE — PATIENT INSTRUCTIONS
-Go to [email or web address] to schedule a COVID-19 vaccine appointment during the appropriate phase for you  -Go to the [facility] lab located at [address] [place] [address] [facility] for fasting blood work. They are open from 8 AM to 4:15 PM Monday through Friday. You do not need an appointment

## 2021-02-23 LAB — URATE SERPL-MCNC: 7.6 MG/DL (ref 3.4–7)

## 2021-02-25 LAB
25(OH)D3+25(OH)D2 SERPL-MCNC: 49.3 NG/ML (ref 30–100)
ALBUMIN SERPL-MCNC: 4.8 G/DL (ref 3.8–4.8)
ALBUMIN/GLOB SERPL: 1.8 {RATIO} (ref 1.2–2.2)
ALP SERPL-CCNC: 84 IU/L (ref 39–117)
ALT SERPL-CCNC: 12 IU/L (ref 0–44)
APPEARANCE UR: CLEAR
AST SERPL-CCNC: 19 IU/L (ref 0–40)
BACTERIA #/AREA URNS HPF: ABNORMAL /[HPF]
BACTERIA UR CULT: ABNORMAL
BACTERIA UR CULT: ABNORMAL
BASOPHILS # BLD AUTO: 0.1 X10E3/UL (ref 0–0.2)
BASOPHILS NFR BLD AUTO: 1 %
BILIRUB SERPL-MCNC: 0.6 MG/DL (ref 0–1.2)
BILIRUB UR QL STRIP: NEGATIVE
BUN SERPL-MCNC: 14 MG/DL (ref 8–27)
BUN/CREAT SERPL: 14 (ref 10–24)
CALCIUM SERPL-MCNC: 9.6 MG/DL (ref 8.6–10.2)
CHLORIDE SERPL-SCNC: 106 MMOL/L (ref 96–106)
CHOLEST SERPL-MCNC: 163 MG/DL (ref 100–199)
CO2 SERPL-SCNC: 21 MMOL/L (ref 20–29)
COLOR UR: YELLOW
CREAT SERPL-MCNC: 1.01 MG/DL (ref 0.76–1.27)
CRYSTALS URNS MICRO: ABNORMAL
EOSINOPHIL # BLD AUTO: 0.5 X10E3/UL (ref 0–0.4)
EOSINOPHIL NFR BLD AUTO: 6 %
EPI CELLS #/AREA URNS HPF: ABNORMAL /HPF (ref 0–10)
ERYTHROCYTE [DISTWIDTH] IN BLOOD BY AUTOMATED COUNT: 12.3 % (ref 11.6–15.4)
GLOBULIN SER CALC-MCNC: 2.7 G/DL (ref 1.5–4.5)
GLUCOSE SERPL-MCNC: 105 MG/DL (ref 65–99)
GLUCOSE UR QL: NEGATIVE
HBA1C MFR BLD: 5.8 % (ref 4.8–5.6)
HCT VFR BLD AUTO: 46.2 % (ref 37.5–51)
HDLC SERPL-MCNC: 57 MG/DL
HGB BLD-MCNC: 15.6 G/DL (ref 13–17.7)
HGB UR QL STRIP: NEGATIVE
IMM GRANULOCYTES # BLD AUTO: 0 X10E3/UL (ref 0–0.1)
IMM GRANULOCYTES NFR BLD AUTO: 0 %
KETONES UR QL STRIP: NEGATIVE
LDLC SERPL CALC-MCNC: 91 MG/DL (ref 0–99)
LEUKOCYTE ESTERASE UR QL STRIP: ABNORMAL
LYMPHOCYTES # BLD AUTO: 2 X10E3/UL (ref 0.7–3.1)
LYMPHOCYTES NFR BLD AUTO: 25 %
MCH RBC QN AUTO: 30.4 PG (ref 26.6–33)
MCHC RBC AUTO-ENTMCNC: 33.8 G/DL (ref 31.5–35.7)
MCV RBC AUTO: 90 FL (ref 79–97)
MICRO URNS: ABNORMAL
MONOCYTES # BLD AUTO: 0.7 X10E3/UL (ref 0.1–0.9)
MONOCYTES NFR BLD AUTO: 9 %
MUCOUS THREADS URNS QL MICRO: PRESENT
NEUTROPHILS # BLD AUTO: 4.5 X10E3/UL (ref 1.4–7)
NEUTROPHILS NFR BLD AUTO: 59 %
NITRITE UR QL STRIP: NEGATIVE
ONE SPECIMEN IDENTIFIER: NORMAL
OTHER ANTIBIOTIC SUSC ISLT: ABNORMAL
PH UR STRIP: 5 [PH] (ref 5–7.5)
PLATELET # BLD AUTO: 239 X10E3/UL (ref 150–450)
POTASSIUM SERPL-SCNC: 5.2 MMOL/L (ref 3.5–5.2)
PROT SERPL-MCNC: 7.5 G/DL (ref 6–8.5)
PROT UR QL STRIP: NEGATIVE
PSA SERPL-MCNC: 0.8 NG/ML (ref 0–4)
RBC # BLD AUTO: 5.14 X10E6/UL (ref 4.14–5.8)
RBC #/AREA URNS HPF: ABNORMAL /HPF (ref 0–2)
SODIUM SERPL-SCNC: 141 MMOL/L (ref 134–144)
SP GR UR: 1.02 (ref 1–1.03)
TRIGL SERPL-MCNC: 82 MG/DL (ref 0–149)
TSH SERPL DL<=0.005 MIU/L-ACNC: 2.11 UIU/ML (ref 0.45–4.5)
UNIDENT CRYS URNS QL MICRO: PRESENT
URINALYSIS REFLEX: ABNORMAL
UROBILINOGEN UR STRIP-MCNC: 0.2 MG/DL (ref 0.2–1)
VLDLC SERPL CALC-MCNC: 15 MG/DL (ref 5–40)
WBC # BLD AUTO: 7.7 X10E3/UL (ref 3.4–10.8)
WBC #/AREA URNS HPF: ABNORMAL /HPF (ref 0–5)

## 2021-02-26 RX ORDER — ROSUVASTATIN CALCIUM 20 MG/1
TABLET, COATED ORAL
Qty: 90 TABLET | Refills: 3 | Status: SHIPPED | OUTPATIENT
Start: 2021-02-26 | End: 2022-03-07 | Stop reason: SDUPTHER

## 2021-02-26 RX ORDER — SULFAMETHOXAZOLE AND TRIMETHOPRIM 800; 160 MG/1; MG/1
1 TABLET ORAL 2 TIMES DAILY
Qty: 14 TABLET | Refills: 0 | Status: SHIPPED | OUTPATIENT
Start: 2021-02-26 | End: 2021-08-23

## 2021-08-23 ENCOUNTER — OFFICE VISIT (OUTPATIENT)
Dept: FAMILY MEDICINE CLINIC | Facility: CLINIC | Age: 61
End: 2021-08-23

## 2021-08-23 VITALS
DIASTOLIC BLOOD PRESSURE: 80 MMHG | TEMPERATURE: 98 F | RESPIRATION RATE: 16 BRPM | HEIGHT: 72 IN | OXYGEN SATURATION: 97 % | HEART RATE: 100 BPM | BODY MASS INDEX: 27.36 KG/M2 | SYSTOLIC BLOOD PRESSURE: 122 MMHG | WEIGHT: 202 LBS

## 2021-08-23 DIAGNOSIS — I10 ESSENTIAL HYPERTENSION: Primary | ICD-10-CM

## 2021-08-23 DIAGNOSIS — R73.02 IMPAIRED GLUCOSE TOLERANCE: ICD-10-CM

## 2021-08-23 DIAGNOSIS — K21.9 GASTROESOPHAGEAL REFLUX DISEASE, UNSPECIFIED WHETHER ESOPHAGITIS PRESENT: ICD-10-CM

## 2021-08-23 DIAGNOSIS — J32.9 SINUSITIS, UNSPECIFIED CHRONICITY, UNSPECIFIED LOCATION: ICD-10-CM

## 2021-08-23 PROCEDURE — 99214 OFFICE O/P EST MOD 30 MIN: CPT | Performed by: PHYSICIAN ASSISTANT

## 2021-08-23 NOTE — PROGRESS NOTES
Chief Complaint   Patient presents with   • Hypertension     Lisinopril   • Sinusitis       HPI     Alex Parr is a 61 y.o. male with PMH of HTN, prediabetes, HLD, GERD, gout, and former tobacco use who is here for 6 month  follow-up of chronic conditions.     Patient reports sinus, throat congestion, raw throat starting 1.5 weeks ago. Symptoms are improving currently. Producing some greenish sputum in mornings but does not cough throughout the day. He is using flonase. Currently only has slight sinus pressure. Denies facial pain, fever or chills.     HTN: checks BP at home periodically with readings similar to what was recorded today at check-in. Compliant with low-dose lisinopril.     Prediabetes: He does not follow a particular diet or exercise currently. Up 9 pounds since his last visit. He would like to cut back on the candy bars on the way to work. He is active, golfing more regularly.     GERD: Denies current issues on pepcid.     Past Medical History:   Diagnosis Date   • Gout    • Hyperlipidemia    • Hypertension        Past Surgical History:   Procedure Laterality Date   • INGUINAL HERNIA REPAIR         Family History   Problem Relation Age of Onset   • No Known Problems Mother    • Parkinsonism Father 80   • Hypertension Father        Social History     Socioeconomic History   • Marital status:      Spouse name: Not on file   • Number of children: Not on file   • Years of education: Not on file   • Highest education level: Not on file   Tobacco Use   • Smoking status: Former Smoker     Packs/day: 0.00     Years: 35.00     Pack years: 0.00     Types: Cigarettes     Quit date: 2020     Years since quittin.5   • Smokeless tobacco: Never Used   Substance and Sexual Activity   • Alcohol use: Yes     Alcohol/week: 14.0 standard drinks     Types: 14 Glasses of wine per week     Comment: 2 glasses of wine/night   • Drug use: No   • Sexual activity: Never       No Known  Allergies    ROS    Review of Systems   Constitutional: Negative for chills and fever.   HENT: Positive for congestion, postnasal drip and sinus pressure.    Eyes: Negative for blurred vision.   Respiratory: Positive for cough. Negative for shortness of breath.    Cardiovascular: Negative for chest pain.   Neurological: Negative for dizziness and headache.       Vitals:    08/23/21 1322   BP: 122/80   Pulse: 100   Resp: 16   Temp: 98 °F (36.7 °C)   SpO2: 97%     Body mass index is 27.39 kg/m².      Current Outpatient Medications:   •  cholecalciferol (VITAMIN D3) 25 MCG (1000 UT) tablet, Take 2,000 Units by mouth Daily., Disp: , Rfl:   •  famotidine (PEPCID) 20 MG tablet, Take 1 tablet by mouth Every Night., Disp: 90 tablet, Rfl: 3  •  lisinopril (PRINIVIL,ZESTRIL) 5 MG tablet, Take 1 tablet by mouth Daily., Disp: 90 tablet, Rfl: 3  •  rosuvastatin (CRESTOR) 20 MG tablet, TAKE ONE TABLET BY MOUTH DAILY, Disp: 90 tablet, Rfl: 3    PE    Physical Exam  Vitals reviewed.   Constitutional:       General: He is not in acute distress.     Appearance: He is well-developed.   HENT:      Head: Normocephalic.      Right Ear: Tympanic membrane and ear canal normal. Tympanic membrane is not erythematous.      Left Ear: Tympanic membrane and ear canal normal. Tympanic membrane is not erythematous.      Nose:      Right Sinus: No maxillary sinus tenderness or frontal sinus tenderness.      Left Sinus: No maxillary sinus tenderness or frontal sinus tenderness.      Mouth/Throat:      Mouth: Mucous membranes are moist.      Pharynx: Oropharynx is clear. Uvula midline. No posterior oropharyngeal erythema.      Tonsils: No tonsillar exudate.   Eyes:      General:         Right eye: No discharge.         Left eye: No discharge.      Conjunctiva/sclera: Conjunctivae normal.   Cardiovascular:      Rate and Rhythm: Normal rate and regular rhythm.      Heart sounds: Normal heart sounds.   Pulmonary:      Effort: Pulmonary effort is normal.  No respiratory distress.      Breath sounds: Normal breath sounds. No wheezing, rhonchi or rales.   Musculoskeletal:      Cervical back: Normal range of motion and neck supple.   Lymphadenopathy:      Cervical: No cervical adenopathy.      Upper Body:      Right upper body: No supraclavicular adenopathy.      Left upper body: No supraclavicular adenopathy.   Skin:     General: Skin is warm and dry.   Psychiatric:         Behavior: Behavior normal.         Results    Results for orders placed or performed in visit on 02/22/21   Urine culture, Comprehensive - ,    BLOOD  MANUAL DIFFEREN   Result Value Ref Range    Urine Culture Final report (A)     Result 1 Enterobacter asburiae (A)     Susceptibility Testing Comment    Hemoglobin A1c    Specimen: Blood    BLOOD  MANUAL DIFFEREN   Result Value Ref Range    Hemoglobin A1C 5.8 (H) 4.8 - 5.6 %   Lipid Panel    Specimen: Blood    BLOOD  MANUAL DIFFEREN   Result Value Ref Range    Total Cholesterol 163 100 - 199 mg/dL    Triglycerides 82 0 - 149 mg/dL    HDL Cholesterol 57 >39 mg/dL    VLDL Cholesterol Jermaine 15 5 - 40 mg/dL    LDL Chol Calc (NIH) 91 0 - 99 mg/dL   TSH Rfx On Abnormal To Free T4    Specimen: Blood    BLOOD  MANUAL DIFFEREN   Result Value Ref Range    TSH 2.110 0.450 - 4.500 uIU/mL   Urinalysis With Culture If Indicated -    Specimen: Blood    BLOOD  MANUAL DIFFEREN   Result Value Ref Range    Specific Gravity, UA 1.019 1.005 - 1.030    pH, UA 5.0 5.0 - 7.5    Color, UA Yellow Yellow    Appearance, UA Clear Clear    Leukocytes, UA 1+ (A) Negative    Protein Negative Negative/Trace    Glucose, UA Negative Negative    Ketones Negative Negative    Blood, UA Negative Negative    Bilirubin, UA Negative Negative    Urobilinogen, UA 0.2 0.2 - 1.0 mg/dL    Nitrite, UA Negative Negative    Microscopic Examination See below:     Urinalysis Reflex Comment    Vitamin D 25 Hydroxy    Specimen: Blood    BLOOD  MANUAL DIFFEREN   Result Value Ref Range    25 Hydroxy, Vitamin  D 49.3 30.0 - 100.0 ng/mL   PSA Screen    Specimen: Blood    BLOOD  MANUAL DIFFEREN   Result Value Ref Range    PSA 0.8 0.0 - 4.0 ng/mL   Comprehensive Metabolic Panel    Specimen: Blood    BLOOD  MANUAL DIFFEREN   Result Value Ref Range    Glucose 105 (H) 65 - 99 mg/dL    BUN 14 8 - 27 mg/dL    Creatinine 1.01 0.76 - 1.27 mg/dL    eGFR Non African Am 80 >59 mL/min/1.73    eGFR African Am 92 >59 mL/min/1.73    BUN/Creatinine Ratio 14 10 - 24    Sodium 141 134 - 144 mmol/L    Potassium 5.2 3.5 - 5.2 mmol/L    Chloride 106 96 - 106 mmol/L    Total CO2 21 20 - 29 mmol/L    Calcium 9.6 8.6 - 10.2 mg/dL    Total Protein 7.5 6.0 - 8.5 g/dL    Albumin 4.8 3.8 - 4.8 g/dL    Globulin 2.7 1.5 - 4.5 g/dL    A/G Ratio 1.8 1.2 - 2.2    Total Bilirubin 0.6 0.0 - 1.2 mg/dL    Alkaline Phosphatase 84 39 - 117 IU/L    AST (SGOT) 19 0 - 40 IU/L    ALT (SGPT) 12 0 - 44 IU/L   Uric acid    Specimen: Blood    BLOOD   Result Value Ref Range    Uric Acid 7.6 (H) 3.4 - 7.0 mg/dL   Microscopic Examination -    BLOOD  MANUAL DIFFEREN   Result Value Ref Range    WBC, UA 0-5 0 - 5 /hpf    RBC, UA 0-2 0 - 2 /hpf    Epithelial Cells (non renal) None seen 0 - 10 /hpf    Crystals, UA Present (A) N/A    Crystal Type Calcium Oxalate N/A    Mucus, UA Present Not Estab.    Bacteria, UA None seen None seen/Few   One Specimen Identifier    BLOOD  MANUAL DIFFEREN   Result Value Ref Range    One Specimen Identifier Comment    CBC & Differential    Specimen: Blood    BLOOD  MANUAL DIFFEREN   Result Value Ref Range    WBC 7.7 3.4 - 10.8 x10E3/uL    RBC 5.14 4.14 - 5.80 x10E6/uL    Hemoglobin 15.6 13.0 - 17.7 g/dL    Hematocrit 46.2 37.5 - 51.0 %    MCV 90 79 - 97 fL    MCH 30.4 26.6 - 33.0 pg    MCHC 33.8 31.5 - 35.7 g/dL    RDW 12.3 11.6 - 15.4 %    Platelets 239 150 - 450 x10E3/uL    Neutrophil Rel % 59 Not Estab. %    Lymphocyte Rel % 25 Not Estab. %    Monocyte Rel % 9 Not Estab. %    Eosinophil Rel % 6 Not Estab. %    Basophil Rel % 1 Not Estab. %     Neutrophils Absolute 4.5 1.4 - 7.0 x10E3/uL    Lymphocytes Absolute 2.0 0.7 - 3.1 x10E3/uL    Monocytes Absolute 0.7 0.1 - 0.9 x10E3/uL    Eosinophils Absolute 0.5 (H) 0.0 - 0.4 x10E3/uL    Basophils Absolute 0.1 0.0 - 0.2 x10E3/uL    Immature Granulocyte Rel % 0 Not Estab. %    Immature Grans Absolute 0.0 0.0 - 0.1 x10E3/uL       A/P    Problem List Items Addressed This Visit        Cardiac and Vasculature    Hypertension - Primary  -Controlled  -Continue lisinopril  -Check labs at physical in 6 months       Endocrine and Metabolic    Impaired glucose tolerance    Overview     -A1C: 5.8 - 2/2020  -Stable, discussed low carbohydrate diet regular physical activity as tolerated  -Encouraged weight reduction          Gastrointestinal Abdominal     Gastroesophageal reflux disease  -Controlled, continue Pepcid      Other Visit Diagnoses     Sinusitis, unspecified chronicity, unspecified location    -Symptoms improving, discussed nasal saline irrigation and Mucinex  -Continue Flonase  -Counseled patient to call if he is not better by the end of this week.          Plan of care was reviewed with patient at the conclusion of today's visit. Education was provided regarding diagnoses, management, and the importance of keeping follow-up appointments. The patient was counseled regarding the risks, benefits, and possible side-effects of treatment. Patient and/or family express understanding and agreement with the management plan.        ABDIEL Ratliff

## 2021-08-23 NOTE — PATIENT INSTRUCTIONS
"· Attain adequate rest and increase clear fluid intake   · Practice regular hand hygiene and cover your cough to help prevent spread of infection  · Use warm salt water gargles, lozenges, and hot tea with honey as needed for throat comfort.   · Use over-the-counter Delsym syrup as needed for coughing.   · Use Mucinex 600 mg twice daily and \"ocean\" or \"simply saline\" brand sterile nasal spray twice daily.   · Use warm compresses over sinuses for comfort as needed  · Please keep me informed of any acute changes in your status including new or worsening symptoms.       Sinusitis, Adult  Sinusitis is inflammation of your sinuses. Sinuses are hollow spaces in the bones around your face. Your sinuses are located:  · Around your eyes.  · In the middle of your forehead.  · Behind your nose.  · In your cheekbones.  Mucus normally drains out of your sinuses. When your nasal tissues become inflamed or swollen, mucus can become trapped or blocked. This allows bacteria, viruses, and fungi to grow, which leads to infection. Most infections of the sinuses are caused by a virus.  Sinusitis can develop quickly. It can last for up to 4 weeks (acute) or for more than 12 weeks (chronic). Sinusitis often develops after a cold.  What are the causes?  This condition is caused by anything that creates swelling in the sinuses or stops mucus from draining. This includes:  · Allergies.  · Asthma.  · Infection from bacteria or viruses.  · Deformities or blockages in your nose or sinuses.  · Abnormal growths in the nose (nasal polyps).  · Pollutants, such as chemicals or irritants in the air.  · Infection from fungi (rare).  What increases the risk?  You are more likely to develop this condition if you:  · Have a weak body defense system (immune system).  · Do a lot of swimming or diving.  · Overuse nasal sprays.  · Smoke.  What are the signs or symptoms?  The main symptoms of this condition are pain and a feeling of pressure around the " affected sinuses. Other symptoms include:  · Stuffy nose or congestion.  · Thick drainage from your nose.  · Swelling and warmth over the affected sinuses.  · Headache.  · Upper toothache.  · A cough that may get worse at night.  · Extra mucus that collects in the throat or the back of the nose (postnasal drip).  · Decreased sense of smell and taste.  · Fatigue.  · A fever.  · Sore throat.  · Bad breath.  How is this diagnosed?  This condition is diagnosed based on:  · Your symptoms.  · Your medical history.  · A physical exam.  · Tests to find out if your condition is acute or chronic. This may include:  ? Checking your nose for nasal polyps.  ? Viewing your sinuses using a device that has a light (endoscope).  ? Testing for allergies or bacteria.  ? Imaging tests, such as an MRI or CT scan.  In rare cases, a bone biopsy may be done to rule out more serious types of fungal sinus disease.  How is this treated?  Treatment for sinusitis depends on the cause and whether your condition is chronic or acute.  · If caused by a virus, your symptoms should go away on their own within 10 days. You may be given medicines to relieve symptoms. They include:  ? Medicines that shrink swollen nasal passages (topical intranasal decongestants).  ? Medicines that treat allergies (antihistamines).  ? A spray that eases inflammation of the nostrils (topical intranasal corticosteroids).  ? Rinses that help get rid of thick mucus in your nose (nasal saline washes).  · If caused by bacteria, your health care provider may recommend waiting to see if your symptoms improve. Most bacterial infections will get better without antibiotic medicine. You may be given antibiotics if you have:  ? A severe infection.  ? A weak immune system.  · If caused by narrow nasal passages or nasal polyps, you may need to have surgery.  Follow these instructions at home:  Medicines  · Take, use, or apply over-the-counter and prescription medicines only as told by  your health care provider. These may include nasal sprays.  · If you were prescribed an antibiotic medicine, take it as told by your health care provider. Do not stop taking the antibiotic even if you start to feel better.  Hydrate and humidify    · Drink enough fluid to keep your urine pale yellow. Staying hydrated will help to thin your mucus.  · Use a cool mist humidifier to keep the humidity level in your home above 50%.  · Inhale steam for 10-15 minutes, 3-4 times a day, or as told by your health care provider. You can do this in the bathroom while a hot shower is running.  · Limit your exposure to cool or dry air.  Rest  · Rest as much as possible.  · Sleep with your head raised (elevated).  · Make sure you get enough sleep each night.  General instructions    · Apply a warm, moist washcloth to your face 3-4 times a day or as told by your health care provider. This will help with discomfort.  · Wash your hands often with soap and water to reduce your exposure to germs. If soap and water are not available, use hand .  · Do not smoke. Avoid being around people who are smoking (secondhand smoke).  · Keep all follow-up visits as told by your health care provider. This is important.  Contact a health care provider if:  · You have a fever.  · Your symptoms get worse.  · Your symptoms do not improve within 10 days.  Get help right away if:  · You have a severe headache.  · You have persistent vomiting.  · You have severe pain or swelling around your face or eyes.  · You have vision problems.  · You develop confusion.  · Your neck is stiff.  · You have trouble breathing.  Summary  · Sinusitis is soreness and inflammation of your sinuses. Sinuses are hollow spaces in the bones around your face.  · This condition is caused by nasal tissues that become inflamed or swollen. The swelling traps or blocks the flow of mucus. This allows bacteria, viruses, and fungi to grow, which leads to infection.  · If you were  prescribed an antibiotic medicine, take it as told by your health care provider. Do not stop taking the antibiotic even if you start to feel better.  · Keep all follow-up visits as told by your health care provider. This is important.  This information is not intended to replace advice given to you by your health care provider. Make sure you discuss any questions you have with your health care provider.  Document Revised: 05/20/2019 Document Reviewed: 05/20/2019  [employer] Patient Education © 2021 [employer]    Sinusitis, Adult  Sinusitis is inflammation of your sinuses. Sinuses are hollow spaces in the bones around your face. Your sinuses are located:  · Around your eyes.  · In the middle of your forehead.  · Behind your nose.  · In your cheekbones.  Mucus normally drains out of your sinuses. When your nasal tissues become inflamed or swollen, mucus can become trapped or blocked. This allows bacteria, viruses, and fungi to grow, which leads to infection. Most infections of the sinuses are caused by a virus.  Sinusitis can develop quickly. It can last for up to 4 weeks (acute) or for more than 12 weeks (chronic). Sinusitis often develops after a cold.  What are the causes?  This condition is caused by anything that creates swelling in the sinuses or stops mucus from draining. This includes:  · Allergies.  · Asthma.  · Infection from bacteria or viruses.  · Deformities or blockages in your nose or sinuses.  · Abnormal growths in the nose (nasal polyps).  · Pollutants, such as chemicals or irritants in the air.  · Infection from fungi (rare).  What increases the risk?  You are more likely to develop this condition if you:  · Have a weak body defense system (immune system).  · Do a lot of swimming or diving.  · Overuse nasal sprays.  · Smoke.  What are the signs or symptoms?  The main symptoms of this condition are pain and a feeling of pressure around the affected sinuses. Other symptoms include:  · Stuffy nose or  congestion.  · Thick drainage from your nose.  · Swelling and warmth over the affected sinuses.  · Headache.  · Upper toothache.  · A cough that may get worse at night.  · Extra mucus that collects in the throat or the back of the nose (postnasal drip).  · Decreased sense of smell and taste.  · Fatigue.  · A fever.  · Sore throat.  · Bad breath.  How is this diagnosed?  This condition is diagnosed based on:  · Your symptoms.  · Your medical history.  · A physical exam.  · Tests to find out if your condition is acute or chronic. This may include:  ? Checking your nose for nasal polyps.  ? Viewing your sinuses using a device that has a light (endoscope).  ? Testing for allergies or bacteria.  ? Imaging tests, such as an MRI or CT scan.  In rare cases, a bone biopsy may be done to rule out more serious types of fungal sinus disease.  How is this treated?  Treatment for sinusitis depends on the cause and whether your condition is chronic or acute.  · If caused by a virus, your symptoms should go away on their own within 10 days. You may be given medicines to relieve symptoms. They include:  ? Medicines that shrink swollen nasal passages (topical intranasal decongestants).  ? Medicines that treat allergies (antihistamines).  ? A spray that eases inflammation of the nostrils (topical intranasal corticosteroids).  ? Rinses that help get rid of thick mucus in your nose (nasal saline washes).  · If caused by bacteria, your health care provider may recommend waiting to see if your symptoms improve. Most bacterial infections will get better without antibiotic medicine. You may be given antibiotics if you have:  ? A severe infection.  ? A weak immune system.  · If caused by narrow nasal passages or nasal polyps, you may need to have surgery.  Follow these instructions at home:  Medicines  · Take, use, or apply over-the-counter and prescription medicines only as told by your health care provider. These may include nasal  sprays.  · If you were prescribed an antibiotic medicine, take it as told by your health care provider. Do not stop taking the antibiotic even if you start to feel better.  Hydrate and humidify    · Drink enough fluid to keep your urine pale yellow. Staying hydrated will help to thin your mucus.  · Use a cool mist humidifier to keep the humidity level in your home above 50%.  · Inhale steam for 10-15 minutes, 3-4 times a day, or as told by your health care provider. You can do this in the bathroom while a hot shower is running.  · Limit your exposure to cool or dry air.  Rest  · Rest as much as possible.  · Sleep with your head raised (elevated).  · Make sure you get enough sleep each night.  General instructions    · Apply a warm, moist washcloth to your face 3-4 times a day or as told by your health care provider. This will help with discomfort.  · Wash your hands often with soap and water to reduce your exposure to germs. If soap and water are not available, use hand .  · Do not smoke. Avoid being around people who are smoking (secondhand smoke).  · Keep all follow-up visits as told by your health care provider. This is important.  Contact a health care provider if:  · You have a fever.  · Your symptoms get worse.  · Your symptoms do not improve within 10 days.  Get help right away if:  · You have a severe headache.  · You have persistent vomiting.  · You have severe pain or swelling around your face or eyes.  · You have vision problems.  · You develop confusion.  · Your neck is stiff.  · You have trouble breathing.  Summary  · Sinusitis is soreness and inflammation of your sinuses. Sinuses are hollow spaces in the bones around your face.  · This condition is caused by nasal tissues that become inflamed or swollen. The swelling traps or blocks the flow of mucus. This allows bacteria, viruses, and fungi to grow, which leads to infection.  · If you were prescribed an antibiotic medicine, take it as told by  your health care provider. Do not stop taking the antibiotic even if you start to feel better.  · Keep all follow-up visits as told by your health care provider. This is important.  This information is not intended to replace advice given to you by your health care provider. Make sure you discuss any questions you have with your health care provider.  Document Revised: 05/20/2019 Document Reviewed: 05/20/2019  [employer] Patient Education © 2021 [employer]    Prediabetes Eating Plan  Prediabetes is a condition that causes blood sugar (glucose) levels to be higher than normal. This increases the risk for developing diabetes. In order to prevent diabetes from developing, your health care provider may recommend a diet and other lifestyle changes to help you:  · Control your blood glucose levels.  · Improve your cholesterol levels.  · Manage your blood pressure.  Your health care provider may recommend working with a diet and nutrition specialist (dietitian) to make a meal plan that is best for you.  What are tips for following this plan?  Lifestyle  · Set weight loss goals with the help of your health care team. It is recommended that most people with prediabetes lose 7% of their current body weight.  · Exercise for at least 30 minutes at least 5 days a week.  · Attend a support group or seek ongoing support from a mental health counselor.  · Take over-the-counter and prescription medicines only as told by your health care provider.  Reading food labels  · Read food labels to check the amount of fat, salt (sodium), and sugar in prepackaged foods. Avoid foods that have:  ? Saturated fats.  ? Trans fats.  ? Added sugars.  · Avoid foods that have more than 300 milligrams (mg) of sodium per serving. Limit your daily sodium intake to less than 2,300 mg each day.  Shopping  · Avoid buying pre-made and processed foods.  Cooking  · Cook with olive oil. Do not use butter, lard, or ghee.  · Bake, broil, grill, or boil foods.  Avoid frying.  Meal planning    · Work with your dietitian to develop an eating plan that is right for you. This may include:  ? Tracking how many calories you take in. Use a food diary, notebook, or mobile application to track what you eat at each meal.  ? Using the glycemic index (GI) to plan your meals. The index tells you how quickly a food will raise your blood glucose. Choose low-GI foods. These foods take a longer time to raise blood glucose.  · Consider following a Mediterranean diet. This diet includes:  ? Several servings each day of fresh fruits and vegetables.  ? Eating fish at least twice a week.  ? Several servings each day of whole grains, beans, nuts, and seeds.  ? Using olive oil instead of other fats.  ? Moderate alcohol consumption.  ? Eating small amounts of red meat and whole-fat dairy.  · If you have high blood pressure, you may need to limit your sodium intake or follow a diet such as the DASH eating plan. DASH is an eating plan that aims to lower high blood pressure.  What foods are recommended?  The items listed below may not be a complete list. Talk with your dietitian about what dietary choices are best for you.  Grains  Whole grains, such as whole-wheat or whole-grain breads, crackers, cereals, and pasta. Unsweetened oatmeal. Bulgur. Barley. Quinoa. Brown rice. Corn or whole-wheat flour tortillas or taco shells.  Vegetables  Lettuce. Spinach. Peas. Beets. Cauliflower. Cabbage. Broccoli. Carrots. Tomatoes. Squash. Eggplant. Herbs. Peppers. Onions. Cucumbers. [place] sprouts.  Fruits  Berries. Bananas. Apples. Oranges. Grapes. Papaya. Will. Pomegranate. Kiwi. Grapefruit. Cherries.  Meats and other protein foods  Seafood. Poultry without skin. Lean cuts of pork and beef. Tofu. Eggs. Nuts. Beans.  Dairy  Low-fat or fat-free dairy products, such as yogurt, cottage cheese, and cheese.  Beverages  Water. Tea. Coffee. Sugar-free or diet soda. [place] water. Lowfat or no-fat milk. Milk  alternatives, such as soy or almond milk.  Fats and oils  Olive oil. Canola oil. Sunflower oil. Grapeseed oil. Avocado. Walnuts.  Sweets and desserts  Sugar-free or low-fat pudding. Sugar-free or low-fat ice cream and other frozen treats.  Seasoning and other foods  Herbs. Sodium-free spices. Mustard. Relish. Low-fat, low-sugar ketchup. Low-fat, low-sugar barbecue sauce. Low-fat or fat-free mayonnaise.  What foods are not recommended?  The items listed below may not be a complete list. Talk with your dietitian about what dietary choices are best for you.  Grains  Refined white flour and flour products, such as bread, pasta, snack foods, and cereals.  Vegetables  Canned vegetables. Frozen vegetables with butter or cream sauce.  Fruits  Fruits canned with syrup.  Meats and other protein foods  Fatty cuts of meat. Poultry with skin. Breaded or fried meat. Processed meats.  Dairy  Full-fat yogurt, cheese, or milk.  Beverages  Sweetened drinks, such as sweet iced tea and soda.  Fats and oils  Butter. Lard. Ghee.  Sweets and desserts  Baked goods, such as cake, cupcakes, pastries, cookies, and cheesecake.  Seasoning and other foods  Spice mixes with added salt. Ketchup. Barbecue sauce. Mayonnaise.  Summary  · To prevent diabetes from developing, you may need to make diet and other lifestyle changes to help control blood sugar, improve cholesterol levels, and manage your blood pressure.  · Set weight loss goals with the help of your health care team. It is recommended that most people with prediabetes lose 7 percent of their current body weight.  · Consider following a Mediterranean diet that includes plenty of fresh fruits and vegetables, whole grains, beans, nuts, seeds, fish, lean meat, low-fat dairy, and healthy oils.  This information is not intended to replace advice given to you by your health care provider. Make sure you discuss any questions you have with your health care provider.  Document Revised: 04/10/2020  Document Reviewed: 02/21/2018  [employer] Patient Education © 2021 [employer]

## 2021-09-02 ENCOUNTER — TELEPHONE (OUTPATIENT)
Dept: FAMILY MEDICINE CLINIC | Facility: CLINIC | Age: 61
End: 2021-09-02

## 2021-09-02 NOTE — TELEPHONE ENCOUNTER
Caller: Jeanie Parr    Relationship to patient: Emergency Contact    Best call back number: [phone]    Patient is needing: JEANIE STATED THAT WHEN PATIENT CAME TO GET A COLONOSCOPY IN July 2021 THAT INSURANCE WOULD NOT COVER THIS BECAUSE IT WAS CODED AS A DIAGNOSTIC VISIT     JEANIE JUST WANT TO CLARIFY ABOUT THIS ISSUE AND TALK TO A NURSE OR     PLEASE ADVISE

## 2021-09-03 NOTE — TELEPHONE ENCOUNTER
I would recommend discussing with billing.  From what I can tell on my end it was ordered and coded as a screening colonoscopy.

## 2021-09-03 NOTE — TELEPHONE ENCOUNTER
Attempted to call pt, received no answer. Left  with office number gicen.     Okay for hub to relay message and document     [facility] would recommend discussing with billing.  From what we can tell on our end it was ordered and coded as a screening colonoscopy.

## 2021-09-07 ENCOUNTER — TELEPHONE (OUTPATIENT)
Dept: FAMILY MEDICINE CLINIC | Facility: CLINIC | Age: 61
End: 2021-09-07

## 2021-09-07 NOTE — TELEPHONE ENCOUNTER
Lft. Jairo.     Okay for hub to relay message and document      [facility] would recommend discussing with billing.  From what we can tell on our end it was ordered and coded as a screening colonoscopy.Okay for hub to relay message and document

## 2021-09-07 NOTE — TELEPHONE ENCOUNTER
"Caller: Sierra Parr    Relationship: Emergency Contact    Best call back number:    [phone]     What is the best time to reach you:     ANY TIME    Who are you requesting to speak with (clinical staff, provider,  specific staff member):     ILEANA MILES    Do you know the name of the person who called:     N/A    What was the call regarding:     CALLER MADE CONTACT TO QUESTION REASON FOR COLONOSCOPY COMPLETED FOR PATIENT/    HUB READ THE \"HUB TO READ\" MESSAGE FROM ILEANA MILES, DATED 9/7/21    CALLER REQUESTED TO SPEAK TO NURSE REGARDING THE MESSAGE    CALLER STILL DID NOT UNDERSTAND AND HAD ADDITIONAL QUESTIONS REGARDING BILLING REASON FOR COLONOSCOPY    Do you require a callback:     IF NECESSARY FOR CLARIFICATION    ILEANA MILES  "

## 2021-09-08 NOTE — TELEPHONE ENCOUNTER
Contacted patient & informed him that [facility] recommends he contact billing. Billing number was given to the patient & he did not have any additional questions at this time.

## 2021-09-08 NOTE — TELEPHONE ENCOUNTER
Colonoscopy was ordered as a screening for colorectal cancer. See 9/2/21 telephone encounter: recommended patient discuss with billing if they have questions. Typically screening colonoscopies are covered as preventative health care.

## 2021-11-05 ENCOUNTER — TELEPHONE (OUTPATIENT)
Dept: FAMILY MEDICINE CLINIC | Facility: CLINIC | Age: 61
End: 2021-11-05

## 2021-11-05 NOTE — TELEPHONE ENCOUNTER
Caller: Alex Parr    Relationship: Self    Best call back number: [phone]    Caller requesting test results:YES    What test was performed: COLONOSCOPY     When was the test performed: ABOUT 5 YEARS AGO     Where was the test performed: [facility]    Additional notes: PATIENT STATED DR MILES ORDER HIM A COLONOSCOPY ABOUT 5 YEARS AGO AND HE JUST WANTED TO GET THE RESULTS- PATIENT HAS HAD A NEW ONE IN July AND WANTS TO COMPARE THE RESULTS

## 2021-11-05 NOTE — TELEPHONE ENCOUNTER
Called and spoke to pt. Informed of providers msg as listed below. Pt voiced understanding. Had no further questions/concerns at this time.

## 2021-11-05 NOTE — TELEPHONE ENCOUNTER
Called pt to clarify. Pt stated he had colonoscopy done in July of this year, however was curious, in comparison, with his colonoscopy 5years ago. Pt stated that he had a polyp found that was removed 5years ago. Pt curious if it was cancerous and or why this colonoscopy in July was a diagnostic colonoscopy vs a screening. Please advise, thanks

## 2021-11-05 NOTE — TELEPHONE ENCOUNTER
The polyp they found in 2016 was the kind that can become cancerous if allowed to increase in size. That is why his gastroenterologist recommended 5 yr follow-up. This was ordered as a screening colonoscopy on 2/22/21.

## 2022-02-17 RX ORDER — FAMOTIDINE 20 MG/1
TABLET, FILM COATED ORAL
Qty: 30 TABLET | Refills: 0 | Status: SHIPPED | OUTPATIENT
Start: 2022-02-17 | End: 2022-03-07 | Stop reason: SDUPTHER

## 2022-02-17 NOTE — TELEPHONE ENCOUNTER
Rx Refill Note  Requested Prescriptions     Pending Prescriptions Disp Refills   • famotidine (PEPCID) 20 MG tablet [Pharmacy Med Name: FAMOTIDINE 20 MG TABLET] 90 tablet 3     Sig: TAKE ONE TABLET BY MOUTH ONCE NIGHTLY      Last office visit with prescribing clinician: 8/23/2021      Next office visit with prescribing clinician: 3/7/2022     Denice Cosme MA  02/17/22, 13:03 EST     Last fill: 02/22/2021

## 2022-03-07 ENCOUNTER — OFFICE VISIT (OUTPATIENT)
Dept: FAMILY MEDICINE CLINIC | Facility: CLINIC | Age: 62
End: 2022-03-07

## 2022-03-07 VITALS
OXYGEN SATURATION: 96 % | HEIGHT: 72 IN | SYSTOLIC BLOOD PRESSURE: 130 MMHG | TEMPERATURE: 97.5 F | RESPIRATION RATE: 16 BRPM | DIASTOLIC BLOOD PRESSURE: 84 MMHG | WEIGHT: 202.6 LBS | BODY MASS INDEX: 27.44 KG/M2 | HEART RATE: 104 BPM

## 2022-03-07 DIAGNOSIS — G47.9 SLEEP DISORDER: ICD-10-CM

## 2022-03-07 DIAGNOSIS — E55.9 VITAMIN D DEFICIENCY: ICD-10-CM

## 2022-03-07 DIAGNOSIS — K21.9 GASTROESOPHAGEAL REFLUX DISEASE, UNSPECIFIED WHETHER ESOPHAGITIS PRESENT: ICD-10-CM

## 2022-03-07 DIAGNOSIS — R73.02 IMPAIRED GLUCOSE TOLERANCE: ICD-10-CM

## 2022-03-07 DIAGNOSIS — K57.30 DIVERTICULOSIS OF LARGE INTESTINE WITHOUT HEMORRHAGE: ICD-10-CM

## 2022-03-07 DIAGNOSIS — Z86.010 HISTORY OF COLONIC POLYPS: ICD-10-CM

## 2022-03-07 DIAGNOSIS — Z87.891 PERSONAL HISTORY OF NICOTINE DEPENDENCE: ICD-10-CM

## 2022-03-07 DIAGNOSIS — M10.9 GOUT INVOLVING TOE, UNSPECIFIED CAUSE, UNSPECIFIED CHRONICITY, UNSPECIFIED LATERALITY: ICD-10-CM

## 2022-03-07 DIAGNOSIS — E78.5 HYPERLIPIDEMIA, UNSPECIFIED HYPERLIPIDEMIA TYPE: ICD-10-CM

## 2022-03-07 DIAGNOSIS — Z12.5 SCREENING FOR MALIGNANT NEOPLASM OF PROSTATE: ICD-10-CM

## 2022-03-07 DIAGNOSIS — Z00.00 PREVENTATIVE HEALTH CARE: Primary | ICD-10-CM

## 2022-03-07 DIAGNOSIS — J32.9 SINUSITIS, UNSPECIFIED CHRONICITY, UNSPECIFIED LOCATION: ICD-10-CM

## 2022-03-07 DIAGNOSIS — I10 PRIMARY HYPERTENSION: ICD-10-CM

## 2022-03-07 PROCEDURE — 99396 PREV VISIT EST AGE 40-64: CPT | Performed by: PHYSICIAN ASSISTANT

## 2022-03-07 RX ORDER — FAMOTIDINE 20 MG/1
20 TABLET, FILM COATED ORAL NIGHTLY
Qty: 90 TABLET | Refills: 3 | Status: SHIPPED | OUTPATIENT
Start: 2022-03-07 | End: 2023-03-13 | Stop reason: SDUPTHER

## 2022-03-07 RX ORDER — LISINOPRIL 5 MG/1
5 TABLET ORAL DAILY
Qty: 90 TABLET | Refills: 3 | Status: SHIPPED | OUTPATIENT
Start: 2022-03-07 | End: 2023-03-13

## 2022-03-07 RX ORDER — ROSUVASTATIN CALCIUM 20 MG/1
20 TABLET, COATED ORAL DAILY
Qty: 90 TABLET | Refills: 3 | Status: SHIPPED | OUTPATIENT
Start: 2022-03-07 | End: 2023-03-13 | Stop reason: SDUPTHER

## 2022-03-07 NOTE — PATIENT INSTRUCTIONS
Please let me know if you would like to proceed with lung cancer screening. Ask your insurance if a low-dose chest CT is covered  I recommend using nasal saline spray to irrigate once daily then using Flonase 2 sprays in each nostril daily.  You can also use Mucinex if you feel like you have a lot of congestion.  If you have recurrence unilateral facial pain or fever or ongoing symptoms for more than 10 days, please let me know

## 2022-03-07 NOTE — PROGRESS NOTES
Reason for visit    Alex Parr is a 62 y.o. male who presents for annual comprehensive exam.    Chief Complaint   Patient presents with   • Annual Exam   • Hypertension   • Impaired glucose tolerance       HPI     Here for physical.  History of hypertension, former tobacco use, prediabetes, hyperlipidemia, gout, vitamin D deficiency, carpal tunnel syndrome, GERD, colon polyps.    Treated for sinusitis at [facility] a couple of weeks ago. Improved with Augmentin and prednisone. Currently he has slight pressure in his forehead and some congestion in the right nostril. Brings up sputum in mornings that improves during the day. Denies fever, chills. Taking antihistamine and using flonase which helps.   Denies gout flares in the past year.       Diet/Physical activity:  -Reports healthy diet  -He is up about 9 pounds since last year. His wife has retired and cooks more now  -Denies regular exercise    Immunizations:  -He has had 2 COVID vaccines    Cancer screening:   -No known family history of cancer  -Colonoscopy last year  -Will monitor PSA today  -Discussed lung cancer screening    Depression: PHQ-2 Depression Screening  -Negative    Substance use:  -He quit smoking 2 years ago  -1/2 ppd x 40 yrs  -2 glasses of wine/night  -Denies recreational drug use  -2 diet pepsi/day    Sexual health:  -Monogamous relationship    AAA screen:  -Not indicated at this time    Dental/vision/skin:  -Current with dental and eye exams  -Denies new/changing/concerning skin lesions    Past Medical History:   Diagnosis Date   • Gout    • Hyperlipidemia    • Hypertension        Past Surgical History:   Procedure Laterality Date   • INGUINAL HERNIA REPAIR  2001       Family History   Problem Relation Age of Onset   • No Known Problems Mother    • Parkinsonism Father 80   • Hypertension Father        Social History     Socioeconomic History   • Marital status:    Tobacco Use   • Smoking status: Former Smoker     Packs/day: 0.00      Years: 35.00     Pack years: 0.00     Types: Cigarettes     Quit date: 2020     Years since quittin.1   • Smokeless tobacco: Never Used   Vaping Use   • Vaping Use: Never used   Substance and Sexual Activity   • Alcohol use: Yes     Alcohol/week: 14.0 standard drinks     Types: 14 Glasses of wine per week     Comment: 2 glasses of wine/night   • Drug use: No   • Sexual activity: Never       No Known Allergies    ROS    Review of Systems   Constitutional: Negative for appetite change, chills, diaphoresis, fever and unexpected weight loss.   HENT: Positive for congestion, hearing loss (stable) and sinus pressure. Negative for sore throat, swollen glands and trouble swallowing.    Eyes: Negative for blurred vision and visual disturbance.   Respiratory: Negative for cough, shortness of breath and wheezing.    Cardiovascular: Negative for chest pain.   Gastrointestinal: Negative for abdominal pain, blood in stool, constipation, diarrhea and GERD.   Endocrine: Negative for cold intolerance, heat intolerance and polydipsia.   Genitourinary: Negative for difficulty urinating, dysuria, hematuria, nocturia, scrotal swelling and testicular pain.   Musculoskeletal: Positive for arthralgias (stable). Negative for gait problem.   Skin: Negative for rash and skin lesions.   Neurological: Positive for numbness and headache. Negative for dizziness.   Hematological: Negative for adenopathy.   Psychiatric/Behavioral: Positive for sleep disturbance (few months). Negative for depressed mood. The patient is not nervous/anxious.        Vitals:    22 1525   BP: 130/84   Pulse: 104   Resp: 16   Temp: 97.5 °F (36.4 °C)   SpO2: 96%     Body mass index is 27.48 kg/m².      Current Outpatient Medications:   •  cholecalciferol (VITAMIN D3) 25 MCG (1000 UT) tablet, Take 2,000 Units by mouth Daily., Disp: , Rfl:   •  famotidine (PEPCID) 20 MG tablet, Take 1 tablet by mouth Every Night., Disp: 90 tablet, Rfl: 3  •  lisinopril  (PRINIVIL,ZESTRIL) 5 MG tablet, Take 1 tablet by mouth Daily., Disp: 90 tablet, Rfl: 3  •  rosuvastatin (CRESTOR) 20 MG tablet, Take 1 tablet by mouth Daily., Disp: 90 tablet, Rfl: 3  •  vitamin B-12 (CYANOCOBALAMIN) 1000 MCG tablet, Take 1,000 mcg by mouth Daily., Disp: , Rfl:     PE    Physical Exam  Vitals reviewed.   Constitutional:       General: He is not in acute distress.     Appearance: He is well-developed.   HENT:      Head: Normocephalic and atraumatic.      Right Ear: Hearing and tympanic membrane normal.      Left Ear: Hearing and tympanic membrane normal.      Nose: Congestion and rhinorrhea present.      Mouth/Throat:      Mouth: Mucous membranes are moist.      Dentition: Normal dentition.      Pharynx: Oropharynx is clear.   Eyes:      Extraocular Movements: Extraocular movements intact.      Conjunctiva/sclera: Conjunctivae normal.      Pupils: Pupils are equal, round, and reactive to light.      Comments:      Neck:      Thyroid: No thyroid mass or thyromegaly.      Vascular: No carotid bruit.   Cardiovascular:      Rate and Rhythm: Normal rate and regular rhythm.      Heart sounds: No murmur heard.    No friction rub.   Pulmonary:      Effort: Pulmonary effort is normal.      Breath sounds: Normal breath sounds.   Chest:   Breasts:      Right: No supraclavicular adenopathy.      Left: No supraclavicular adenopathy.       Abdominal:      General: Bowel sounds are normal. There is no abdominal bruit.      Palpations: Abdomen is soft. There is no mass.      Tenderness: There is no abdominal tenderness.   Musculoskeletal:         General: Normal range of motion.      Cervical back: Normal range of motion and neck supple.      Right knee: Crepitus present. Normal range of motion.      Left knee: Crepitus present. Normal range of motion.   Lymphadenopathy:      Cervical: No cervical adenopathy.      Upper Body:      Right upper body: No supraclavicular adenopathy.      Left upper body: No  supraclavicular adenopathy.   Skin:     General: Skin is warm and dry.      Findings: No rash.      Nails: There is no clubbing.      Comments: No suspicious nevi on clothed exam   Neurological:      Mental Status: He is alert.      Gait: Gait normal.      Deep Tendon Reflexes: Reflexes normal.   Psychiatric:         Speech: Speech normal.         Behavior: Behavior normal.         A/P    Problem List Items Addressed This Visit        Cardiac and Vasculature    Hypertension  -Good control  -Continue low-dose lisinopril    Relevant Medications    lisinopril (PRINIVIL,ZESTRIL) 5 MG tablet    Hyperlipidemia  -Monitor lipids    Relevant Medications    rosuvastatin (CRESTOR) 20 MG tablet       Endocrine and Metabolic    Vitamin D deficiency  -Monitor      Impaired glucose tolerance  -Monitor hemoglobin A1c       Gastrointestinal Abdominal     History of colonic polyps    Overview     7/2021         Gastroesophageal reflux disease  -Controlled   -Continue Pepcid    Relevant Medications    famotidine (PEPCID) 20 MG tablet    Diverticulosis of large intestine       Musculoskeletal and Injuries    Gout    Overview     -No recent flares.   -Monitor uric acid          Tobacco    Personal history of nicotine dependence  -History of 20-pack-year tobacco use  -Recommended low-dose CT of the chest for lung cancer screening.  Patient states he would like to check with his pharmacy on coverage before proceeding with this. He will let me know if he would like an order      Other Visit Diagnoses     Preventative health care    -  Primary  -Counseled patient regarding cancer screening, immunizations, healthy lifestyle, diet, maintaining a healthy weight, and exercise.   -Annual dental and eye exams were encouraged.  -Patient would like to defer immunizations today until his sinus symptoms are improved which is reasonable  -Return to complete labs fasting   -Discussed he may receive Shingrix as well as Covid booster from his local  pharmacy.  I counseled him to wait 2 weeks in between his Covid booster and starting Shingrix    Relevant Orders    CBC (No Diff)    Comprehensive Metabolic Panel    Lipid Panel    TSH Rfx On Abnormal To Free T4    Urinalysis With Culture If Indicated - Urine, Clean Catch    Hemoglobin A1c    Vitamin D 25 Hydroxy    Screening for malignant neoplasm of prostate        Relevant Orders    PSA Screen    Sleep disorder      -Discussed sleep hygiene, decreasing alcohol intake, exercise      Sinusitis, unspecified chronicity, unspecified location      -Continue Flonase and antihistamine daily  -Add Mucinex and nasal saline          Plan of care was reviewed with patient at the conclusion of today's visit. Education was provided regarding diagnoses, management, treatment plan, and the importance of keeping follow-up appointments. The patient was counseled regarding the risks, benefits, and possible side-effects of treatment. Patient and/or family expresses understanding and agreement with the management plan.        ABDIEL Ratliff

## 2022-04-04 ENCOUNTER — LAB (OUTPATIENT)
Dept: LAB | Facility: HOSPITAL | Age: 62
End: 2022-04-04

## 2022-04-04 DIAGNOSIS — Z00.00 PREVENTATIVE HEALTH CARE: ICD-10-CM

## 2022-04-04 DIAGNOSIS — M10.9 GOUT INVOLVING TOE, UNSPECIFIED CAUSE, UNSPECIFIED CHRONICITY, UNSPECIFIED LATERALITY: ICD-10-CM

## 2022-04-04 DIAGNOSIS — Z12.5 SCREENING FOR MALIGNANT NEOPLASM OF PROSTATE: ICD-10-CM

## 2022-04-05 LAB
25(OH)D3+25(OH)D2 SERPL-MCNC: 39.9 NG/ML (ref 30–100)
ALBUMIN SERPL-MCNC: 4.8 G/DL (ref 3.5–5.2)
ALBUMIN/GLOB SERPL: 1.9 G/DL
ALP SERPL-CCNC: 88 U/L (ref 39–117)
ALT SERPL-CCNC: 13 U/L (ref 1–41)
APPEARANCE UR: CLEAR
AST SERPL-CCNC: 23 U/L (ref 1–40)
BACTERIA #/AREA URNS HPF: NORMAL /HPF
BILIRUB SERPL-MCNC: 0.4 MG/DL (ref 0–1.2)
BILIRUB UR QL STRIP: NEGATIVE
BUN SERPL-MCNC: 12 MG/DL (ref 8–23)
BUN/CREAT SERPL: 11.8 (ref 7–25)
CALCIUM SERPL-MCNC: 9.6 MG/DL (ref 8.6–10.5)
CASTS URNS QL MICRO: NORMAL /LPF
CHLORIDE SERPL-SCNC: 105 MMOL/L (ref 98–107)
CHOLEST SERPL-MCNC: 177 MG/DL (ref 0–200)
CO2 SERPL-SCNC: 21.8 MMOL/L (ref 22–29)
COLOR UR: YELLOW
CREAT SERPL-MCNC: 1.02 MG/DL (ref 0.76–1.27)
EGFRCR SERPLBLD CKD-EPI 2021: 83.1 ML/MIN/1.73
EPI CELLS #/AREA URNS HPF: NORMAL /HPF (ref 0–10)
ERYTHROCYTE [DISTWIDTH] IN BLOOD BY AUTOMATED COUNT: 11.9 % (ref 12.3–15.4)
GLOBULIN SER CALC-MCNC: 2.5 GM/DL
GLUCOSE SERPL-MCNC: 95 MG/DL (ref 65–99)
GLUCOSE UR QL STRIP: NEGATIVE
HBA1C MFR BLD: 6 % (ref 4.8–5.6)
HCT VFR BLD AUTO: 44.3 % (ref 37.5–51)
HDLC SERPL-MCNC: 50 MG/DL (ref 40–60)
HGB BLD-MCNC: 15.3 G/DL (ref 13–17.7)
HGB UR QL STRIP: NEGATIVE
KETONES UR QL STRIP: NEGATIVE
LDLC SERPL CALC-MCNC: 111 MG/DL (ref 0–100)
LEUKOCYTE ESTERASE UR QL STRIP: NEGATIVE
MCH RBC QN AUTO: 30.4 PG (ref 26.6–33)
MCHC RBC AUTO-ENTMCNC: 34.5 G/DL (ref 31.5–35.7)
MCV RBC AUTO: 87.9 FL (ref 79–97)
MICRO URNS: NORMAL
MICRO URNS: NORMAL
NITRITE UR QL STRIP: NEGATIVE
PH UR STRIP: 5.5 [PH] (ref 5–7.5)
PLATELET # BLD AUTO: 249 10*3/MM3 (ref 140–450)
POTASSIUM SERPL-SCNC: 5 MMOL/L (ref 3.5–5.2)
PROT SERPL-MCNC: 7.3 G/DL (ref 6–8.5)
PROT UR QL STRIP: NEGATIVE
PSA SERPL-MCNC: 0.62 NG/ML (ref 0–4)
RBC # BLD AUTO: 5.04 10*6/MM3 (ref 4.14–5.8)
RBC #/AREA URNS HPF: NORMAL /HPF (ref 0–2)
SODIUM SERPL-SCNC: 139 MMOL/L (ref 136–145)
SP GR UR STRIP: 1.02 (ref 1–1.03)
TRIGL SERPL-MCNC: 88 MG/DL (ref 0–150)
TSH SERPL DL<=0.005 MIU/L-ACNC: 1.68 UIU/ML (ref 0.27–4.2)
URATE SERPL-MCNC: 7.9 MG/DL (ref 3.4–7)
URINALYSIS REFLEX: NORMAL
UROBILINOGEN UR STRIP-MCNC: 0.2 MG/DL (ref 0.2–1)
VLDLC SERPL CALC-MCNC: 16 MG/DL (ref 5–40)
WBC # BLD AUTO: 7.18 10*3/MM3 (ref 3.4–10.8)
WBC #/AREA URNS HPF: NORMAL /HPF (ref 0–5)

## 2022-04-07 ENCOUNTER — TELEPHONE (OUTPATIENT)
Dept: FAMILY MEDICINE CLINIC | Facility: CLINIC | Age: 62
End: 2022-04-07

## 2022-04-07 NOTE — TELEPHONE ENCOUNTER
----- Message from ABDIEL Ratliff sent at 4/7/2022  9:14 AM EDT -----  Please notify the patient of lab results.  The amount of uric acid in his blood is mildly elevated but since he has not had any gout flares in the past year, I recommend continuing a low purine diet.  There is plenty of online information about this type of diet if he needs additional information.  If he has any gout flares, he should let me know.  Hemoglobin A1c which is a measure of 3-month blood sugar average is still in a prediabetes range.  This is mildly increased from 5.8 last year to 6.0 this year.  Diabetes would not be diagnosed until the A1c reaches 6.5.  His bad cholesterol (LDL) is slightly higher compared to last year.  This was 91 last year and is now 111.  I would like to see the LDL less than 100. I recommend a diet low in saturated fats and carbohydrates and 30 minutes of aerobic exercise on 5 days weekly.  He may continue his current dose of rosuvastatin.  Other results do not show any significant abnormalities.  Please let me know if he has any questions.    Attempted to contact, no answer. Left message  Hub can relay and document.

## 2022-06-09 ENCOUNTER — TELEPHONE (OUTPATIENT)
Dept: FAMILY MEDICINE CLINIC | Facility: CLINIC | Age: 62
End: 2022-06-09

## 2022-06-09 NOTE — TELEPHONE ENCOUNTER
Caller: Alex Parr    Relationship: Self    Best call back number: [phone]    What medication are you requesting:antibiotic and steroid    What are your current symptoms: swollen sinus on left side, stuffy nose,     How long have you been experiencing symptoms: 2 days    Have you had these symptoms before:    [x] Yes  [] No    Have you been treated for these symptoms before:   [x] Yes  [] No    If a prescription is needed, what is your preferred pharmacy and phone number: [facility] [address] [place] [address] [phone] [facility] [phone]      Additional notes:

## 2022-06-10 ENCOUNTER — OFFICE VISIT (OUTPATIENT)
Dept: FAMILY MEDICINE CLINIC | Facility: CLINIC | Age: 62
End: 2022-06-10

## 2022-06-10 VITALS
DIASTOLIC BLOOD PRESSURE: 86 MMHG | BODY MASS INDEX: 27.5 KG/M2 | HEART RATE: 90 BPM | HEIGHT: 72 IN | OXYGEN SATURATION: 97 % | TEMPERATURE: 97.3 F | SYSTOLIC BLOOD PRESSURE: 114 MMHG | RESPIRATION RATE: 14 BRPM | WEIGHT: 203 LBS

## 2022-06-10 DIAGNOSIS — I10 PRIMARY HYPERTENSION: ICD-10-CM

## 2022-06-10 DIAGNOSIS — J34.0 NASAL ABSCESS: Primary | ICD-10-CM

## 2022-06-10 PROCEDURE — 99213 OFFICE O/P EST LOW 20 MIN: CPT | Performed by: PHYSICIAN ASSISTANT

## 2022-06-10 RX ORDER — AMOXICILLIN AND CLAVULANATE POTASSIUM 875; 125 MG/1; MG/1
1 TABLET, FILM COATED ORAL 2 TIMES DAILY
Qty: 20 TABLET | Refills: 0 | Status: SHIPPED | OUTPATIENT
Start: 2022-06-10 | End: 2022-11-08

## 2022-06-10 NOTE — PROGRESS NOTES
"    Chief Complaint   Patient presents with   • Sinusitis     X 3 days ( runny nose, facial pain)       HPI     Alex Parr is a pleasant 62 y.o. male who presents for evaluation of \"chief complaint.\"   Patient c/o 5-day history of right maxillary facial pain, nasal swelling, and right nostril rhinorrhea. He has clear drainage. He also has some right upper tooth gum soreness x 1-2 days. He sees the dentist regularly. Denies fever, chills, sick contacts, and travel. He had similar symptoms in February with a sinus infection that improved on prednisone and Augmentin.     Past Medical History:   Diagnosis Date   • Gout    • Hyperlipidemia    • Hypertension        Past Surgical History:   Procedure Laterality Date   • INGUINAL HERNIA REPAIR         Family History   Problem Relation Age of Onset   • No Known Problems Mother    • Parkinsonism Father 80   • Hypertension Father        Social History     Socioeconomic History   • Marital status:    Tobacco Use   • Smoking status: Former Smoker     Packs/day: 0.00     Years: 35.00     Pack years: 0.00     Types: Cigarettes     Quit date: 2020     Years since quittin.3   • Smokeless tobacco: Never Used   Vaping Use   • Vaping Use: Never used   Substance and Sexual Activity   • Alcohol use: Yes     Alcohol/week: 14.0 standard drinks     Types: 14 Glasses of wine per week     Comment: 2 glasses of wine/night   • Drug use: No   • Sexual activity: Never       No Known Allergies    ROS    Review of Systems   Constitutional: Negative for chills and fever.   HENT: Positive for congestion, rhinorrhea and sinus pressure. Negative for postnasal drip and sore throat.    Respiratory: Negative for cough and shortness of breath.    Cardiovascular: Negative for chest pain.   Musculoskeletal: Negative for myalgias.   Neurological: Positive for headache.       Vitals:    06/10/22 0959   BP: 114/86   Pulse:    Resp:    Temp:    SpO2:      Body mass index is 27.53 " kg/m².      Current Outpatient Medications:   •  cholecalciferol (VITAMIN D3) 25 MCG (1000 UT) tablet, Take 2,000 Units by mouth Daily., Disp: , Rfl:   •  famotidine (PEPCID) 20 MG tablet, Take 1 tablet by mouth Every Night., Disp: 90 tablet, Rfl: 3  •  lisinopril (PRINIVIL,ZESTRIL) 5 MG tablet, Take 1 tablet by mouth Daily., Disp: 90 tablet, Rfl: 3  •  rosuvastatin (CRESTOR) 20 MG tablet, Take 1 tablet by mouth Daily., Disp: 90 tablet, Rfl: 3  •  vitamin B-12 (CYANOCOBALAMIN) 1000 MCG tablet, Take 1,000 mcg by mouth Daily., Disp: , Rfl:   •  amoxicillin-clavulanate (AUGMENTIN) 875-125 MG per tablet, Take 1 tablet by mouth 2 (Two) Times a Day., Disp: 20 tablet, Rfl: 0    PE    Physical Exam  Vitals reviewed.   Constitutional:       General: He is not in acute distress.     Appearance: He is well-developed.   HENT:      Head: Normocephalic.      Right Ear: Tympanic membrane and ear canal normal. Tympanic membrane is not erythematous.      Left Ear: Tympanic membrane and ear canal normal. Tympanic membrane is not erythematous.      Nose: Nasal tenderness present.      Right Sinus: Maxillary sinus tenderness present. No frontal sinus tenderness.      Left Sinus: No maxillary sinus tenderness or frontal sinus tenderness.        Mouth/Throat:      Mouth: Mucous membranes are moist.      Pharynx: Oropharynx is clear. Uvula midline. No posterior oropharyngeal erythema.      Tonsils: No tonsillar exudate.   Eyes:      General:         Right eye: No discharge.         Left eye: No discharge.      Conjunctiva/sclera: Conjunctivae normal.   Cardiovascular:      Rate and Rhythm: Normal rate and regular rhythm.      Heart sounds: Normal heart sounds.   Pulmonary:      Effort: Pulmonary effort is normal. No respiratory distress.      Breath sounds: Normal breath sounds. No wheezing, rhonchi or rales.   Chest:   Breasts:      Right: No supraclavicular adenopathy.      Left: No supraclavicular adenopathy.       Musculoskeletal:       Cervical back: Normal range of motion and neck supple.   Lymphadenopathy:      Cervical: No cervical adenopathy.      Upper Body:      Right upper body: No supraclavicular adenopathy.      Left upper body: No supraclavicular adenopathy.   Skin:     General: Skin is warm and dry.   Psychiatric:         Behavior: Behavior normal.          A/P    Problem List Items Addressed This Visit        Cardiac and Vasculature    Hypertension  -Blood pressure was elevated at check-in. On repeat for me it is normal  -Continue low-dose lisinopril    Relevant Medications    lisinopril (PRINIVIL,ZESTRIL) 5 MG tablet      Other Visit Diagnoses     Nasal abscess      -Previously improved on Augmentin  -Rx Augmentin today. Recommended warm compresses and nasal saline irrigation  -RTC if symptoms do not improve or worsen          Plan of care was reviewed with patient at the conclusion of today's visit. Education was provided regarding diagnoses, management, prescribed or recommended OTC products, and the importance of compliance with follow-up appointments. The patient was counseled regarding the risks, benefits, and possible side-effects of treatment. I advised the patient to keep me informed of any acute changes in their status including new, worsening, or persistent symptoms. Patient expresses understanding and agreement with the management plan.        ABDIEL Ratliff

## 2022-08-09 ENCOUNTER — TELEPHONE (OUTPATIENT)
Dept: FAMILY MEDICINE CLINIC | Facility: CLINIC | Age: 62
End: 2022-08-09

## 2022-08-09 NOTE — TELEPHONE ENCOUNTER
Caller: Alex Parr    Relationship to patient: Self    Best call back number: [phone]    Date of positive COVID19 test: 08/09/22    COVID19 symptoms: SORE THROAT AND CONGESTION     Date of initial quarantine: 08/09/22    Additional information or concerns:     What is the patients preferred pharmacy: [facility] [address] - [phone]  - [phone] [facility]

## 2022-08-09 NOTE — TELEPHONE ENCOUNTER
Patient reports that his symptoms onset yesterday. He received positive 8/8     He is experiencing sore throat and fatigue, and congestion. He has been prescribed paxlovid this morning. He will  ibuprofen OTC as well. I advised him to follow up with us if he worsens.

## 2022-11-02 ENCOUNTER — TELEPHONE (OUTPATIENT)
Dept: ORTHOPEDIC SURGERY | Facility: CLINIC | Age: 62
End: 2022-11-02

## 2022-11-02 NOTE — TELEPHONE ENCOUNTER
CALLED PATIENT TO SCHEDULE APPOINTMENT WITH DR SYED 11/3 OR NEXT WEEK PER DR SYED. LEFT VOICEMAIL.

## 2022-11-08 ENCOUNTER — OFFICE VISIT (OUTPATIENT)
Dept: ORTHOPEDIC SURGERY | Facility: CLINIC | Age: 62
End: 2022-11-08

## 2022-11-08 VITALS
WEIGHT: 205.2 LBS | BODY MASS INDEX: 27.79 KG/M2 | DIASTOLIC BLOOD PRESSURE: 74 MMHG | SYSTOLIC BLOOD PRESSURE: 117 MMHG | HEIGHT: 72 IN

## 2022-11-08 DIAGNOSIS — M17.11 PRIMARY OSTEOARTHRITIS OF RIGHT KNEE: ICD-10-CM

## 2022-11-08 DIAGNOSIS — M25.561 RIGHT KNEE PAIN, UNSPECIFIED CHRONICITY: Primary | ICD-10-CM

## 2022-11-08 PROCEDURE — 99204 OFFICE O/P NEW MOD 45 MIN: CPT | Performed by: ORTHOPAEDIC SURGERY

## 2022-11-08 PROCEDURE — 20610 DRAIN/INJ JOINT/BURSA W/O US: CPT | Performed by: ORTHOPAEDIC SURGERY

## 2022-11-08 RX ORDER — TRIAMCINOLONE ACETONIDE 40 MG/ML
40 INJECTION, SUSPENSION INTRA-ARTICULAR; INTRAMUSCULAR
Status: COMPLETED | OUTPATIENT
Start: 2022-11-08 | End: 2022-11-08

## 2022-11-08 RX ORDER — LIDOCAINE HYDROCHLORIDE 10 MG/ML
3 INJECTION, SOLUTION EPIDURAL; INFILTRATION; INTRACAUDAL; PERINEURAL
Status: COMPLETED | OUTPATIENT
Start: 2022-11-08 | End: 2022-11-08

## 2022-11-08 RX ADMIN — LIDOCAINE HYDROCHLORIDE 3 ML: 10 INJECTION, SOLUTION EPIDURAL; INFILTRATION; INTRACAUDAL; PERINEURAL at 11:00

## 2022-11-08 RX ADMIN — TRIAMCINOLONE ACETONIDE 40 MG: 40 INJECTION, SUSPENSION INTRA-ARTICULAR; INTRAMUSCULAR at 11:00

## 2022-11-08 NOTE — PROGRESS NOTES
Procedure   - Large Joint Arthrocentesis: bilateral knee on 11/8/2022 11:00 AM  Indications: pain  Details: (23G) needle, anterolateral approach  Medications (Right): 3 mL lidocaine PF 1% 1 %; 40 mg triamcinolone acetonide 40 MG/ML  Medications (Left): 3 mL lidocaine PF 1% 1 %; 40 mg triamcinolone acetonide 40 MG/ML  Outcome: tolerated well, no immediate complications  Procedure, treatment alternatives, risks and benefits explained, specific risks discussed. Consent was given by the patient. Immediately prior to procedure a time out was called to verify the correct patient, procedure, equipment, support staff and site/side marked as required. Patient was prepped and draped in the usual sterile fashion.

## 2022-11-08 NOTE — PROGRESS NOTES
[facility] – [place] Orthopaedic Surgery Clinic Note        Subjective     Pain of the Right Knee and Pain of the Left Knee      HPI    Alex Parr is a 62 y.o. male who presents with new problem of: bilateral knee pain.  Onset: atraumatic and gradual in nature. The issue has been ongoing for 1 year(s). Pain is a 5/10 on the pain scale. Pain is described as aching. Associated symptoms include swelling, popping, grinding and giving way/buckling. The pain is worse with sitting, climbing stairs and rising from seated position; resting improve the pain. Previous treatments have included: NSAIDS.    I have reviewed the following portions of the patient's history:History of Present Illness and review of systems.         Patient is here today at the request of his daughter for evaluation of bilateral knee pain.  Right side is more symptomatic than the left.  He works at [employer].  He has pain in the central aspect of each knee.  This has been going on about a year.  He had a right knee injection years ago which helped him.  He is here for further evaluation and treatment.  Has had no PT to date.      Past Medical History:   Diagnosis Date   • Gout    • Hyperlipidemia    • Hypertension       Past Surgical History:   Procedure Laterality Date   • INGUINAL HERNIA REPAIR        Family History   Problem Relation Age of Onset   • No Known Problems Mother    • Parkinsonism Father 80   • Hypertension Father      Social History     Socioeconomic History   • Marital status:    Tobacco Use   • Smoking status: Former     Packs/day: 0.00     Years: 35.00     Pack years: 0.00     Types: Cigarettes     Quit date: 2020     Years since quittin.7   • Smokeless tobacco: Never   Vaping Use   • Vaping Use: Never used   Substance and Sexual Activity   • Alcohol use: Yes     Alcohol/week: 14.0 standard drinks     Types: 14 Glasses of wine per week     Comment: 2 glasses of wine/night   • Drug use: No   • Sexual activity: Never     "  Current Outpatient Medications on File Prior to Visit   Medication Sig Dispense Refill   • cholecalciferol (VITAMIN D3) 25 MCG (1000 UT) tablet Take 2,000 Units by mouth Daily.     • famotidine (PEPCID) 20 MG tablet Take 1 tablet by mouth Every Night. 90 tablet 3   • lisinopril (PRINIVIL,ZESTRIL) 5 MG tablet Take 1 tablet by mouth Daily. 90 tablet 3   • rosuvastatin (CRESTOR) 20 MG tablet Take 1 tablet by mouth Daily. 90 tablet 3   • vitamin B-12 (CYANOCOBALAMIN) 1000 MCG tablet Take 1,000 mcg by mouth Daily.     • [DISCONTINUED] amoxicillin-clavulanate (AUGMENTIN) 875-125 MG per tablet Take 1 tablet by mouth 2 (Two) Times a Day. 20 tablet 0     No current facility-administered medications on file prior to visit.      No Known Allergies       Review of Systems   Constitutional: Negative.    HENT: Negative.    Eyes: Negative.    Respiratory: Negative.    Cardiovascular: Negative.    Gastrointestinal: Negative.    Endocrine: Negative.    Genitourinary: Negative.    Musculoskeletal: Positive for arthralgias.   Skin: Negative.    Allergic/Immunologic: Negative.    Neurological: Negative.    Hematological: Negative.    Psychiatric/Behavioral: Negative.         I reviewed the patient's chief complaint, history of present illness, review of systems, past medical history, surgical history, family history, social history, medications and allergy list.        Objective      Physical Exam  /74   Ht 182.9 cm (72.01\")   Wt 93.1 kg (205 lb 3.2 oz)   BMI 27.82 kg/m²     Body mass index is 27.82 kg/m².    General  Mental Status - alert  General Appearance - cooperative, well groomed, not in acute distress  Orientation - Oriented X3  Build & Nutrition - well developed and well nourished  Posture - normal posture  Gait - normal gait       Ortho Exam  Peripheral Vascular:    Upper Extremity:   Inspection:  Left--no cyanotic nail beds Right--no cyanotic nail beds   Bilateral:  Pink nail beds with brisk capillary " refill   Palpation:  Bilateral radial pulse normal    Musculoskeletal:  Global Assessment:  Overall assessment of Lower Extremity Muscle Strength and Tone:  Left quadriceps--5/5   Left hamstrings--5/5       Left tibialis anterior--5/5  Left gastroc-soleus--5/5  Left EHL--5/5    Right quadriceps--5/5  Right hamstrings--5/5  Right tibialis anterior--5/5  Right gastroc soleus--5/5  Right EHL--5/5    Lower Extremity:  Knee/Patella:  No digital clubbing or cyanosis.    Examination of left and right knees reveals:  Normal deep tendon reflexes, coordination, strength, tone, sensation.  No known fractures or deformities.    Inspection and Palpation:    Left knee:  Tenderness:  Over the medial joint line and moderate severity  Effusion:  1+  Crepitus:  Positive  Pulses:  2+  Ecchymosis:  None  Warmth:  None     Right knee:  Tenderness:  Over the medial joint line and moderate severity  Effusion:  1+  Crepitus:  Positive  Pulses:  2+  Ecchymosis:  None  Warmth:  None     ROM:  Right:  Extension:5    Flexion:120  Left:  Extension:5     Flexion:120    Instability:    Left:  Lachman Test:  Negative, Varus stress test negative, Valgus stress test negative  Right:  Lachman Test:  Negative, Varus stress test negative, Valgus stress test negative    Deformities/Malalignments/Discrepancies:    Left:  Genu Varum   Right:  Genu Varum    Functional Testing:  Right:  Nata's test:  Negative  Patella grind test:  Positive  Q-angle:  Normal    Left:  Nata's test:  Negative  Patella grind test:  Positive  Q-angle:  normal    Imaging/Studies  Imaging Results (Last 24 Hours)     Procedure Component Value Units Date/Time    XR Knee 4+ View Bilateral [[id number]] Resulted: 11/08/22 1052     Updated: 11/08/22 1053    Narrative:      Knee X-Ray    Indication: Pain    Study:  Upright AP, Skiers, Lateral, and Sunrise views of Bilateral   knee(s)    Comparison: None    Findings:    Patient appears to have early moderate degenerative changes in  the medial   compartment.    There are mild degenerative changes in the lateral compartment.    There are moderate to early severe changes in the patellofemoral   compartment.    Patient has overall varus alignment.    Kellgren-Curt stGstrstastdstest:st st1st Impression:   Early moderate medial compartment and moderate to early severe   patellofemoral compartment degnerative changes of the right and left knee               Assessment    Assessment:  1. Right knee pain, unspecified chronicity    2. Primary osteoarthritis of right knee        Plan:  1. Continue over-the-counter medication as needed for discomfort  2. Bilateral knee arthritis, primarily patellofemoral--plan will be for steroid injections today.  We recommend he do some close chain quadricep strengthening exercises.  He tells me he will talk to his daughter about providing something for him.  I will send her a note today.  Follow-up with me in about 2 to 3 months we will assess efficacy of the injection and assess his need for further modalities.    Procedure Note:    I discussed with the patient the potential benefits of performing a therapeutic injections as well as potential risks including but not limited to infection, swelling, pain, bleeding, bruising, nerve/vessel damage, skin color changes, transient elevation in blood glucose levels, and fat atrophy. After informed consent and after the areas were prepped with chlorhexadine soap, ethyl chloride was used to numb the skin. Via the anterolateral approach, 3mL of 1% lidocaine followed by 40mg of Kenalog were each injected into the right and left knee joint. The patient tolerated the procedure well. There were no complications. A sterile dressing was placed over the injection sites.          Jameson Levy MD  11/08/22  18:17 EST      Dictated Utilizing Dragon Dictation.

## 2023-02-06 ENCOUNTER — OFFICE VISIT (OUTPATIENT)
Dept: ORTHOPEDIC SURGERY | Facility: CLINIC | Age: 63
End: 2023-02-06
Payer: COMMERCIAL

## 2023-02-06 VITALS
WEIGHT: 202.4 LBS | HEIGHT: 72 IN | BODY MASS INDEX: 27.41 KG/M2 | SYSTOLIC BLOOD PRESSURE: 152 MMHG | DIASTOLIC BLOOD PRESSURE: 90 MMHG

## 2023-02-06 DIAGNOSIS — M70.62 TROCHANTERIC BURSITIS OF LEFT HIP: ICD-10-CM

## 2023-02-06 DIAGNOSIS — M25.552 LEFT HIP PAIN: Primary | ICD-10-CM

## 2023-02-06 PROCEDURE — 99213 OFFICE O/P EST LOW 20 MIN: CPT | Performed by: ORTHOPAEDIC SURGERY

## 2023-02-06 PROCEDURE — 20610 DRAIN/INJ JOINT/BURSA W/O US: CPT | Performed by: ORTHOPAEDIC SURGERY

## 2023-02-06 RX ORDER — LIDOCAINE HYDROCHLORIDE 10 MG/ML
4 INJECTION, SOLUTION EPIDURAL; INFILTRATION; INTRACAUDAL; PERINEURAL
Status: COMPLETED | OUTPATIENT
Start: 2023-02-06 | End: 2023-02-06

## 2023-02-06 RX ORDER — BUPIVACAINE HYDROCHLORIDE 2.5 MG/ML
4 INJECTION, SOLUTION EPIDURAL; INFILTRATION; INTRACAUDAL
Status: COMPLETED | OUTPATIENT
Start: 2023-02-06 | End: 2023-02-06

## 2023-02-06 RX ORDER — TRIAMCINOLONE ACETONIDE 40 MG/ML
40 INJECTION, SUSPENSION INTRA-ARTICULAR; INTRAMUSCULAR
Status: COMPLETED | OUTPATIENT
Start: 2023-02-06 | End: 2023-02-06

## 2023-02-06 RX ADMIN — TRIAMCINOLONE ACETONIDE 40 MG: 40 INJECTION, SUSPENSION INTRA-ARTICULAR; INTRAMUSCULAR at 14:19

## 2023-02-06 RX ADMIN — LIDOCAINE HYDROCHLORIDE 4 ML: 10 INJECTION, SOLUTION EPIDURAL; INFILTRATION; INTRACAUDAL; PERINEURAL at 14:19

## 2023-02-06 RX ADMIN — BUPIVACAINE HYDROCHLORIDE 4 ML: 2.5 INJECTION, SOLUTION EPIDURAL; INFILTRATION; INTRACAUDAL at 14:19

## 2023-02-06 NOTE — PROGRESS NOTES
Procedure   - Large Joint Arthrocentesis: L greater trochanteric bursa on 2/6/2023 2:19 PM  Indications: pain  Details: (23g) needle, anterolateral approach  Medications: 4 mL bupivacaine (PF) 0.25 %; 4 mL lidocaine PF 1% 1 %; 40 mg triamcinolone acetonide 40 MG/ML  Outcome: tolerated well, no immediate complications  Procedure, treatment alternatives, risks and benefits explained, specific risks discussed. Consent was given by the patient. Immediately prior to procedure a time out was called to verify the correct patient, procedure, equipment, support staff and site/side marked as required. Patient was prepped and draped in the usual sterile fashion.

## 2023-02-06 NOTE — PROGRESS NOTES
[facility] – [place] Orthopaedic Surgery Clinic Note        Subjective     Pain of the Left Hip      HPI    Alex Parr is a 63 y.o. male.  I last saw him in 2017 for left hip pain.  He had trochanteric bursitis and did well with cortisone injection.  3 months ago his hip started to hurt again.  No other trauma.    Past Medical History:   Diagnosis Date   • Gout    • Hyperlipidemia    • Hypertension       Past Surgical History:   Procedure Laterality Date   • INGUINAL HERNIA REPAIR  2001      Family History   Problem Relation Age of Onset   • No Known Problems Mother    • Parkinsonism Father 80   • Hypertension Father      Social History     Socioeconomic History   • Marital status:    Tobacco Use   • Smoking status: Former     Packs/day: 0.00     Years: 35.00     Pack years: 0.00     Types: Cigarettes     Quit date: 1/22/2020     Years since quitting: 3.0   • Smokeless tobacco: Never   Vaping Use   • Vaping Use: Never used   Substance and Sexual Activity   • Alcohol use: Yes     Alcohol/week: 14.0 standard drinks     Types: 14 Glasses of wine per week     Comment: 2 glasses of wine/night   • Drug use: No   • Sexual activity: Never      Current Outpatient Medications on File Prior to Visit   Medication Sig Dispense Refill   • cholecalciferol (VITAMIN D3) 25 MCG (1000 UT) tablet Take 2,000 Units by mouth Daily.     • famotidine (PEPCID) 20 MG tablet Take 1 tablet by mouth Every Night. 90 tablet 3   • lisinopril (PRINIVIL,ZESTRIL) 5 MG tablet Take 1 tablet by mouth Daily. 90 tablet 3   • rosuvastatin (CRESTOR) 20 MG tablet Take 1 tablet by mouth Daily. 90 tablet 3   • vitamin B-12 (CYANOCOBALAMIN) 1000 MCG tablet Take 1,000 mcg by mouth Daily.       No current facility-administered medications on file prior to visit.      No Known Allergies       Review of Systems   Constitutional: Negative.    HENT: Negative.    Eyes: Negative.    Respiratory: Negative.    Cardiovascular: Negative.    Gastrointestinal: Negative.   "  Endocrine: Negative.    Genitourinary: Negative.    Musculoskeletal: Positive for arthralgias.   Skin: Negative.    Allergic/Immunologic: Negative.    Neurological: Negative.    Hematological: Negative.    Psychiatric/Behavioral: Negative.         I reviewed the patient's chief complaint, history of present illness, review of systems, past medical history, surgical history, family history, social history, medications and allergy list.        Objective      Physical Exam  /90   Ht 182.9 cm (72.01\")   Wt 91.8 kg (202 lb 6.4 oz)   BMI 27.44 kg/m²     Body mass index is 27.44 kg/m².    General  Mental Status - alert  General Appearance - cooperative, well groomed, not in acute distress  Orientation - Oriented X3  Build & Nutrition - well developed and well nourished  Posture - normal posture  Gait - normal gait       Ortho Exam  Left hip is tender over the greater trochanter and trochanter bursa.  Negative straight leg raise normal gait full motion full-strength    Imaging/Studies  Imaging Results (Last 24 Hours)     Procedure Component Value Units Date/Time    XR Hip With or Without Pelvis 2 - 3 View Left [[id number]] Resulted: 02/06/23 1415     Updated: 02/06/23 1416    Narrative:      Left Hip X-Ray  Indication: Pain  AP pelvis and Frog Leg views    Findings:  No fracture  No bony lesion  Normal soft tissues  Normal joint spaces    prior studies were available for comparison.            Assessment    Assessment:  1. Left hip pain    2. Trochanteric bursitis of left hip        Plan:  1. Continue over-the-counter medication as needed for discomfort  2. I injected his left hip trochanteric bursa with cortisone.  I discussed with the patient the potential benefits of performing a therapeutic injection of the cortisone as well as potential risks including but not limited to infection, swelling, pain, bleeding, bruising, nerve/vessel damage, skin color changes, transient elevation in blood glucose levels, and fat " atrophy. After informed consent and verifying correct patient, procedure site, and type of procedure, the area was prepped with Hibiclens, ethyl chloride was used to numb the skin. The patient tolerated the procedure well. There were no complications.        Ranulfo Jones MD  02/06/23  14:18 EST      Dictated Utilizing Dragon Dictation.

## 2023-03-13 ENCOUNTER — OFFICE VISIT (OUTPATIENT)
Dept: FAMILY MEDICINE CLINIC | Facility: CLINIC | Age: 63
End: 2023-03-13
Payer: COMMERCIAL

## 2023-03-13 VITALS
DIASTOLIC BLOOD PRESSURE: 96 MMHG | WEIGHT: 204.4 LBS | HEART RATE: 92 BPM | OXYGEN SATURATION: 98 % | BODY MASS INDEX: 27.68 KG/M2 | TEMPERATURE: 97.3 F | SYSTOLIC BLOOD PRESSURE: 140 MMHG | HEIGHT: 72 IN

## 2023-03-13 DIAGNOSIS — I73.9 PAD (PERIPHERAL ARTERY DISEASE): ICD-10-CM

## 2023-03-13 DIAGNOSIS — Z00.00 PREVENTATIVE HEALTH CARE: Primary | ICD-10-CM

## 2023-03-13 DIAGNOSIS — E78.5 HYPERLIPIDEMIA, UNSPECIFIED HYPERLIPIDEMIA TYPE: ICD-10-CM

## 2023-03-13 DIAGNOSIS — K57.30 DIVERTICULOSIS OF LARGE INTESTINE WITHOUT HEMORRHAGE: ICD-10-CM

## 2023-03-13 DIAGNOSIS — M10.9 GOUT INVOLVING TOE, UNSPECIFIED CAUSE, UNSPECIFIED CHRONICITY, UNSPECIFIED LATERALITY: ICD-10-CM

## 2023-03-13 DIAGNOSIS — E55.9 VITAMIN D DEFICIENCY: ICD-10-CM

## 2023-03-13 DIAGNOSIS — G56.03 BILATERAL CARPAL TUNNEL SYNDROME: ICD-10-CM

## 2023-03-13 DIAGNOSIS — K21.9 GASTROESOPHAGEAL REFLUX DISEASE, UNSPECIFIED WHETHER ESOPHAGITIS PRESENT: ICD-10-CM

## 2023-03-13 DIAGNOSIS — R25.2 MUSCLE CRAMPS: ICD-10-CM

## 2023-03-13 DIAGNOSIS — K64.8 INTERNAL HEMORRHOIDS: ICD-10-CM

## 2023-03-13 DIAGNOSIS — Z87.891 PERSONAL HISTORY OF NICOTINE DEPENDENCE: ICD-10-CM

## 2023-03-13 DIAGNOSIS — Z12.5 SCREENING FOR MALIGNANT NEOPLASM OF PROSTATE: ICD-10-CM

## 2023-03-13 DIAGNOSIS — I10 PRIMARY HYPERTENSION: ICD-10-CM

## 2023-03-13 DIAGNOSIS — R73.02 IMPAIRED GLUCOSE TOLERANCE: ICD-10-CM

## 2023-03-13 DIAGNOSIS — Z86.010 HISTORY OF COLONIC POLYPS: ICD-10-CM

## 2023-03-13 RX ORDER — ROSUVASTATIN CALCIUM 20 MG/1
20 TABLET, COATED ORAL DAILY
Qty: 90 TABLET | Refills: 3 | Status: SHIPPED | OUTPATIENT
Start: 2023-03-13

## 2023-03-13 RX ORDER — FAMOTIDINE 20 MG/1
20 TABLET, FILM COATED ORAL NIGHTLY
Qty: 90 TABLET | Refills: 3 | Status: CANCELLED | OUTPATIENT
Start: 2023-03-13

## 2023-03-13 RX ORDER — LISINOPRIL 20 MG/1
20 TABLET ORAL DAILY
Qty: 30 TABLET | Refills: 5 | Status: SHIPPED | OUTPATIENT
Start: 2023-03-13 | End: 2023-04-03

## 2023-03-13 RX ORDER — ROSUVASTATIN CALCIUM 20 MG/1
20 TABLET, COATED ORAL DAILY
Qty: 90 TABLET | Refills: 3 | Status: CANCELLED | OUTPATIENT
Start: 2023-03-13

## 2023-03-13 RX ORDER — LISINOPRIL 5 MG/1
5 TABLET ORAL DAILY
Qty: 90 TABLET | Refills: 3 | Status: CANCELLED | OUTPATIENT
Start: 2023-03-13

## 2023-03-13 RX ORDER — FAMOTIDINE 20 MG/1
20 TABLET, FILM COATED ORAL NIGHTLY
Qty: 90 TABLET | Refills: 3 | Status: SHIPPED | OUTPATIENT
Start: 2023-03-13

## 2023-03-13 NOTE — PROGRESS NOTES
Reason for visit    Alex Parr is a 63 y.o. male who presents for annual comprehensive exam.    Chief Complaint   Patient presents with   • Annual Exam     Fasting for labs   • Hypertension   • Hyperlipidemia       HPI     Here for physical.   Reports feeling well. He is going to PT for left hip pain. Thought it was bursitis. It did not improve with a cortisone from his orthopedist. It is improving with physical therapy.  He has not checked his blood pressure at home recently. It was high at his orthopedist's office last month also.  He notes cramping in hands and feet for 6 months.    Diet/Physical activity:  -He reports healthy diet.   -He is physically active at work but does not otherwise exercise.   -He does yard work.     Immunizations:  -Declines influenza and COVID vaccines.   -Has not had Shingrix yet.     Cancer screening:   -His colonoscopy was normal in 2021.   -No prior lung cancer screening.   -PSA will be due for monitoring next month.   -He denies any change in Fhx.     PHQ-9 Depression Screening  Little interest or pleasure in doing things? 0-->not at all   Feeling down, depressed, or hopeless? 0-->not at all   Trouble falling or staying asleep, or sleeping too much?     Feeling tired or having little energy?     Poor appetite or overeating?     Feeling bad about yourself - or that you are a failure or have let yourself or your family down?     Trouble concentrating on things, such as reading the newspaper or watching television?     Moving or speaking so slowly that other people could have noticed? Or the opposite - being so fidgety or restless that you have been moving around a lot more than usual?     Thoughts that you would be better off dead, or of hurting yourself in some way?     PHQ-9 Total Score 0   If you checked off any problems, how difficult have these problems made it for you to do your work, take care of things at home, or get along with other people?           Substance  use:  -He quit smoking 3 years ago.  -1/2 ppd x 40 yrs  -2 glasses of wine/night  -Denies recreational drug use.  -1 diet Pepsi/day at most.     AAA screen:  -Denies family history of AAA.     Dental/vision/skin:  -Current with dental and eye exams.  -Denies new/changing/concerning skin lesions.    Past Medical History:   Diagnosis Date   • Gout    • Hyperlipidemia    • Hypertension        Past Surgical History:   Procedure Laterality Date   • INGUINAL HERNIA REPAIR  2001       Family History   Problem Relation Age of Onset   • No Known Problems Mother    • Parkinsonism Father 80   • Hypertension Father        Social History     Socioeconomic History   • Marital status:    Tobacco Use   • Smoking status: Former     Packs/day: 0.00     Years: 35.00     Pack years: 0.00     Types: Cigarettes     Quit date: 1/22/2020     Years since quitting: 3.1   • Smokeless tobacco: Never   Vaping Use   • Vaping Use: Never used   Substance and Sexual Activity   • Alcohol use: Yes     Alcohol/week: 14.0 standard drinks     Types: 14 Glasses of wine per week     Comment: 2 glasses of wine/night   • Drug use: No   • Sexual activity: Never       No Known Allergies    ROS    Review of Systems   Constitutional: Negative for appetite change, chills, diaphoresis, fatigue, fever and unexpected weight loss.   HENT: Positive for congestion and postnasal drip. Negative for sore throat and trouble swallowing.    Eyes: Negative for blurred vision.   Respiratory: Negative for cough and shortness of breath.    Cardiovascular: Negative for chest pain.   Gastrointestinal: Positive for GERD (occasional). Negative for abdominal pain, blood in stool, constipation and diarrhea.   Endocrine: Negative for polydipsia.   Genitourinary: Negative for difficulty urinating, dysuria, hematuria, nocturia, scrotal swelling and testicular pain.   Musculoskeletal: Positive for arthralgias and myalgias (cramps in hands, feet). Negative for gait problem.   Skin:  Negative for rash and skin lesions.   Neurological: Positive for numbness (hands). Negative for dizziness, weakness and headache.   Psychiatric/Behavioral: Negative for sleep disturbance and depressed mood. The patient is not nervous/anxious.        Vitals:    03/13/23 1411   BP: 140/96   Pulse: 92   Temp: 97.3 °F (36.3 °C)   SpO2: 98%     Body mass index is 27.71 kg/m².      Current Outpatient Medications:   •  cholecalciferol (VITAMIN D3) 25 MCG (1000 UT) tablet, Take 2 tablets by mouth Daily., Disp: , Rfl:   •  famotidine (PEPCID) 20 MG tablet, Take 1 tablet by mouth Every Night., Disp: 90 tablet, Rfl: 3  •  lisinopril (PRINIVIL,ZESTRIL) 20 MG tablet, Take 1 tablet by mouth Daily., Disp: 30 tablet, Rfl: 5  •  rosuvastatin (CRESTOR) 20 MG tablet, Take 1 tablet by mouth Daily., Disp: 90 tablet, Rfl: 3  •  vitamin B-12 (CYANOCOBALAMIN) 1000 MCG tablet, Take 1 tablet by mouth Daily., Disp: , Rfl:     PE    Physical Exam  Vitals reviewed.   Constitutional:       General: He is not in acute distress.     Appearance: He is well-developed.   HENT:      Head: Normocephalic and atraumatic.      Right Ear: Hearing and tympanic membrane normal.      Left Ear: Hearing and tympanic membrane normal.      Mouth/Throat:      Mouth: Mucous membranes are moist.      Dentition: Normal dentition.      Pharynx: Oropharynx is clear.   Eyes:      Extraocular Movements: Extraocular movements intact.      Conjunctiva/sclera: Conjunctivae normal.      Pupils: Pupils are equal, round, and reactive to light.      Comments:      Neck:      Thyroid: No thyroid mass or thyromegaly.      Vascular: No carotid bruit.   Cardiovascular:      Rate and Rhythm: Normal rate and regular rhythm.      Pulses:           Dorsalis pedis pulses are 0 on the right side and 0 on the left side.        Posterior tibial pulses are 0 on the right side and 0 on the left side.      Heart sounds: No murmur heard.    No friction rub.      Comments: Difficult to palpate  pedal pulses. Skin is warm and normal in color.   Pulmonary:      Effort: Pulmonary effort is normal.      Breath sounds: Normal breath sounds.   Abdominal:      General: Bowel sounds are normal. There is no abdominal bruit.      Palpations: Abdomen is soft. There is no mass.      Tenderness: There is no abdominal tenderness.   Musculoskeletal:         General: Normal range of motion.      Cervical back: Normal range of motion and neck supple.   Lymphadenopathy:      Cervical: No cervical adenopathy.      Upper Body:      Right upper body: No supraclavicular adenopathy.      Left upper body: No supraclavicular adenopathy.   Skin:     General: Skin is warm and dry.      Findings: No rash.      Nails: There is no clubbing.      Comments: No suspicious nevi on clothed exam.    Neurological:      Mental Status: He is alert.      Gait: Gait normal.      Deep Tendon Reflexes: Reflexes normal.   Psychiatric:         Speech: Speech normal.         Behavior: Behavior normal.         A/P    Problem List Items Addressed This Visit        Cardiac and Vasculature    Hypertension    Relevant Medications    lisinopril (PRINIVIL,ZESTRIL) 20 MG tablet    Hyperlipidemia    Relevant Medications    rosuvastatin (CRESTOR) 20 MG tablet       Endocrine and Metabolic    Vitamin D deficiency    Overview     1/19         Impaired glucose tolerance    Overview     A1C: 5.8 - 2/2020          Gastrointestinal Abdominal     Internal hemorrhoids    History of colonic polyps    Overview     7/2021         Gastroesophageal reflux disease    Relevant Medications    famotidine (PEPCID) 20 MG tablet    Diverticulosis of large intestine       Musculoskeletal and Injuries    Gout    Overview     -No recent flares.   -1/19 uric acid 7.2 Low purine diet, limiting alcohol use, allopurinol advised          Neuro    Bilateral carpal tunnel syndrome       Tobacco    Personal history of nicotine dependence    Overview     Quit in 2020        Other Visit  Diagnoses     Preventative health care    -  Primary  -Counseled patient regarding cancer screening, immunizations, healthy lifestyle, diet, maintaining a healthy weight, and exercise.   -Annual dental and eye exams were encouraged.    Relevant Orders    CBC (No Diff)    Comprehensive Metabolic Panel    Lipid Panel    TSH Rfx On Abnormal To Free T4    Urinalysis With Culture If Indicated - Urine, Clean Catch    Vitamin D,25-Hydroxy    Hemoglobin A1c    Uric acid    Screening for malignant neoplasm of prostate        Relevant Orders    PSA Screen    Muscle cramps      -Pt is on statin. Check CK. Urine has been normal in color.     Relevant Orders    CK    PAD (peripheral artery disease) (HCC)      -Pedal pulses difficult to palpate. Risk factors for PAD.   -Pt denies claudication symptoms.   -Will check ABIs.     Relevant Orders    Duplex Upper Extremity Art / Grafts - Left CAR    Doppler Arterial Multi Level Lower Extremity - Bilateral CAR          Plan of care was reviewed with patient at the conclusion of today's visit. Education was provided regarding diagnoses, management, treatment plan, and the importance of keeping follow-up appointments. The patient was counseled regarding the risks, benefits, and possible side-effects of treatment. Patient and/or family expresses understanding and agreement with the management plan.        ABDIEL Ratliff

## 2023-03-14 RX ORDER — LISINOPRIL 5 MG/1
TABLET ORAL
Qty: 30 TABLET | OUTPATIENT
Start: 2023-03-14

## 2023-03-14 RX ORDER — ROSUVASTATIN CALCIUM 20 MG/1
TABLET, COATED ORAL
Qty: 30 TABLET | OUTPATIENT
Start: 2023-03-14

## 2023-04-03 ENCOUNTER — OFFICE VISIT (OUTPATIENT)
Dept: FAMILY MEDICINE CLINIC | Facility: CLINIC | Age: 63
End: 2023-04-03
Payer: COMMERCIAL

## 2023-04-03 VITALS
OXYGEN SATURATION: 95 % | SYSTOLIC BLOOD PRESSURE: 138 MMHG | TEMPERATURE: 96.9 F | HEIGHT: 72 IN | BODY MASS INDEX: 27.5 KG/M2 | HEART RATE: 85 BPM | DIASTOLIC BLOOD PRESSURE: 94 MMHG | WEIGHT: 203 LBS

## 2023-04-03 DIAGNOSIS — I10 PRIMARY HYPERTENSION: Primary | ICD-10-CM

## 2023-04-03 PROCEDURE — 99214 OFFICE O/P EST MOD 30 MIN: CPT | Performed by: PHYSICIAN ASSISTANT

## 2023-04-03 RX ORDER — LISINOPRIL AND HYDROCHLOROTHIAZIDE 20; 12.5 MG/1; MG/1
1 TABLET ORAL DAILY
Qty: 30 TABLET | Refills: 1 | Status: SHIPPED | OUTPATIENT
Start: 2023-04-03

## 2023-04-03 NOTE — PATIENT INSTRUCTIONS
I recommend purchasing an automated upper arm blood pressure device (if you do not already own a blood pressure cuff).   The [employer] brand digital devices receive good reviews. Some models are relatively inexpensive and can be purchased for $25-$40 on [employer]. You may be able to acquire them locally at [employer], or pharmacies as well. Price-checking three different retailers may help you get the best deal.   An ideal setting for measuring your blood pressure is after you have been seated or resting for at least 10-15 minutes with your test arm supported. The kitchen table is often convenient.   Please measure your blood pressure 3 consecutive times and average the readings together to obtain one blood pressure measurement.   I would like for you to measure your blood pressure on 3-4 days per week in the morning and the evening. Allow at least 1 hour after dosing blood pressure medications before measuring your blood pressure.   Keep a written log of your readings and bring it and your blood pressure cuff with you to your next appointment.   Your average blood pressures should remain below 140/90. If you observe persistently higher measurements for more than 2 weeks after medication adjustment, please call our office.

## 2023-04-03 NOTE — ASSESSMENT & PLAN NOTE
Still suboptimal control.  Change losartan to losartan hydrochlorothiazide 20-12.5 mg.  Recommended patient obtain a new home blood pressure cuff for monitoring and report persistent measurements greater than 140/90.  Return to clinic in 3 weeks for blood pressure check.  He plans to complete previously requested labs next week.

## 2023-04-03 NOTE — PROGRESS NOTES
Chief Complaint   Patient presents with   • Hypertension     3 week f/u        HPI     Alex Parr is a 63 y.o. male who is here for follow-up of hypertension.     Lisinopril was increased to 20 mg daily at his last appointment. He started checking his blood pressure at home but he does not think his device is working properly. He was getting high readings. His wife was also however she went to a provider's office and it was lower. His cuff is quite a few years old. It used to be his father's.     Past Medical History:   Diagnosis Date   • Gout    • Hyperlipidemia    • Hypertension        Past Surgical History:   Procedure Laterality Date   • INGUINAL HERNIA REPAIR  2001       Family History   Problem Relation Age of Onset   • No Known Problems Mother    • Parkinsonism Father 80   • Hypertension Father        Social History     Socioeconomic History   • Marital status:    Tobacco Use   • Smoking status: Former     Packs/day: 0.00     Years: 35.00     Pack years: 0.00     Types: Cigarettes     Quit date: 1/22/2020     Years since quitting: 3.1   • Smokeless tobacco: Never   Vaping Use   • Vaping Use: Never used   Substance and Sexual Activity   • Alcohol use: Yes     Alcohol/week: 14.0 standard drinks     Types: 14 Glasses of wine per week     Comment: 2 glasses of wine/night   • Drug use: No   • Sexual activity: Never       No Known Allergies    ROS    Review of Systems   Eyes: Negative for blurred vision.   Respiratory: Negative for shortness of breath.    Cardiovascular: Negative for chest pain.   Neurological: Negative for dizziness, light-headedness and headache.       Vitals:    04/03/23 1052   BP: 138/94   Pulse: 85   Temp: 96.9 °F (36.1 °C)   SpO2: 95%     Body mass index is 27.52 kg/m².      Current Outpatient Medications:   •  cholecalciferol (VITAMIN D3) 25 MCG (1000 UT) tablet, Take 2 tablets by mouth Daily., Disp: , Rfl:   •  famotidine (PEPCID) 20 MG tablet, Take 1 tablet by mouth Every  Night., Disp: 90 tablet, Rfl: 3  •  rosuvastatin (CRESTOR) 20 MG tablet, Take 1 tablet by mouth Daily., Disp: 90 tablet, Rfl: 3  •  vitamin B-12 (CYANOCOBALAMIN) 1000 MCG tablet, Take 1 tablet by mouth Daily., Disp: , Rfl:   •  lisinopril-hydrochlorothiazide (PRINZIDE,ZESTORETIC) 20-12.5 MG per tablet, Take 1 tablet by mouth Daily., Disp: 30 tablet, Rfl: 1    PE    Physical Exam  Vitals reviewed.   Constitutional:       General: He is not in acute distress.     Appearance: He is well-developed.   HENT:      Head: Normocephalic and atraumatic.   Eyes:      Conjunctiva/sclera: Conjunctivae normal.   Cardiovascular:      Rate and Rhythm: Normal rate and regular rhythm.      Heart sounds: Normal heart sounds. No murmur heard.  Pulmonary:      Effort: Pulmonary effort is normal.      Breath sounds: Normal breath sounds.   Musculoskeletal:      Cervical back: Normal range of motion.   Skin:     General: Skin is warm and dry.   Neurological:      Mental Status: He is alert.      Gait: Gait normal.   Psychiatric:         Speech: Speech normal.         Behavior: Behavior normal.         Results    Results for orders placed or performed in visit on 04/04/22   Uric acid    Specimen: Blood    Blood  Release to garret   Result Value Ref Range    Uric Acid 7.9 (H) 3.4 - 7.0 mg/dL   Vitamin D 25 Hydroxy    Specimen: Blood    Blood  Release to garret   Result Value Ref Range    25 Hydroxy, Vitamin D 39.9 30.0 - 100.0 ng/ml   PSA Screen    Specimen: Blood    Blood  Release to garret   Result Value Ref Range    PSA 0.616 0.000 - 4.000 ng/mL   Hemoglobin A1c    Specimen: Blood    Blood  Release to garret   Result Value Ref Range    Hemoglobin A1C 6.00 (H) 4.80 - 5.60 %   Urinalysis With Culture If Indicated - Urine, Clean Catch    Specimen: Urine, Clean Catch    Urine  Release to garret   Result Value Ref Range    Specific Gravity, UA 1.018 1.005 - 1.030    pH, UA 5.5 5.0 - 7.5    Color, UA Yellow Yellow    Appearance, UA Clear Clear     Leukocytes, UA Negative Negative    Protein Negative Negative/Trace    Glucose, UA Negative Negative    Ketones Negative Negative    Blood, UA Negative Negative    Bilirubin, UA Negative Negative    Urobilinogen, UA 0.2 0.2 - 1.0 mg/dL    Nitrite, UA Negative Negative    Microscopic Examination Comment     Microscopic Examination See below:     Urinalysis Reflex Comment    TSH Rfx On Abnormal To Free T4    Specimen: Blood    Blood  Release to garret   Result Value Ref Range    TSH 1.680 0.270 - 4.200 uIU/mL   Lipid Panel    Specimen: Blood    Blood  Release to garret   Result Value Ref Range    Total Cholesterol 177 0 - 200 mg/dL    Triglycerides 88 0 - 150 mg/dL    HDL Cholesterol 50 40 - 60 mg/dL    VLDL Cholesterol Jermaine 16 5 - 40 mg/dL    LDL Chol Calc ([facility]) 111 (H) 0 - 100 mg/dL   Comprehensive Metabolic Panel    Specimen: Blood    Blood  Release to [facility]   Result Value Ref Range    Glucose 95 65 - 99 mg/dL    BUN 12 8 - 23 mg/dL    Creatinine 1.02 0.76 - 1.27 mg/dL    EGFR Result 83.1 >60.0 mL/min/1.73    BUN/Creatinine Ratio 11.8 7.0 - 25.0    Sodium 139 136 - 145 mmol/L    Potassium 5.0 3.5 - 5.2 mmol/L    Chloride 105 98 - 107 mmol/L    Total CO2 21.8 (L) 22.0 - 29.0 mmol/L    Calcium 9.6 8.6 - 10.5 mg/dL    Total Protein 7.3 6.0 - 8.5 g/dL    Albumin 4.80 3.50 - 5.20 g/dL    Globulin 2.5 gm/dL    A/G Ratio 1.9 g/dL    Total Bilirubin 0.4 0.0 - 1.2 mg/dL    Alkaline Phosphatase 88 39 - 117 U/L    AST (SGOT) 23 1 - 40 U/L    ALT (SGPT) 13 1 - 41 U/L   CBC (No Diff)    Specimen: Blood    Blood  Release to [facility]   Result Value Ref Range    WBC 7.18 3.40 - 10.80 10*3/mm3    RBC 5.04 4.14 - 5.80 10*6/mm3    Hemoglobin 15.3 13.0 - 17.7 g/dL    Hematocrit 44.3 37.5 - 51.0 %    MCV 87.9 79.0 - 97.0 fL    MCH 30.4 26.6 - 33.0 pg    MCHC 34.5 31.5 - 35.7 g/dL    RDW 11.9 (L) 12.3 - 15.4 %    Platelets 249 140 - 450 10*3/mm3   Microscopic Examination -    Urine  Release to garret   Result Value Ref Range    WBC, UA None seen 0  - 5 /hpf    RBC, UA None seen 0 - 2 /hpf    Epithelial Cells (non renal) None seen 0 - 10 /hpf    Casts None seen None seen /lpf    Bacteria, UA None seen None seen/Few /hpf       A/P    Problem List Items Addressed This Visit        Cardiac and Vasculature    Hypertension - Primary    Current Assessment & Plan     Still suboptimal control.  Change losartan to losartan hydrochlorothiazide 20-12.5 mg.  Recommended patient obtain a new home blood pressure cuff for monitoring and report persistent measurements greater than 140/90.  Return to clinic in 3 weeks for blood pressure check.  He plans to complete previously requested labs next week.         Relevant Medications    lisinopril-hydrochlorothiazide (PRINZIDE,ZESTORETIC) 20-12.5 MG per tablet       Plan of care was reviewed with patient at the conclusion of today's visit. Education was provided regarding diagnoses, management, and the importance of keeping follow-up appointments. The patient was counseled regarding the risks, benefits, and possible side-effects of treatment. Patient and/or family express understanding and agreement with the management plan.        ABDIEL Ratliff

## 2023-04-10 ENCOUNTER — TELEPHONE (OUTPATIENT)
Dept: FAMILY MEDICINE CLINIC | Facility: CLINIC | Age: 63
End: 2023-04-10
Payer: COMMERCIAL

## 2023-04-10 NOTE — TELEPHONE ENCOUNTER
Pharmacy Name:  [facility]    Pharmacy representative name:     Pharmacy representative phone number: [phone]    What medication are you calling in regards to: lisinopril-hydrochlorothiazide (PRINZIDE,ZESTORETIC) 20-12.5 MG per tablet    What question does the pharmacy have: CLARIFICATION OF DOSAGE    Who is the provider that prescribed the medication: ILEANA MILES    Additional notes:

## 2023-04-24 ENCOUNTER — OFFICE VISIT (OUTPATIENT)
Dept: FAMILY MEDICINE CLINIC | Facility: CLINIC | Age: 63
End: 2023-04-24
Payer: COMMERCIAL

## 2023-04-24 ENCOUNTER — LAB (OUTPATIENT)
Dept: LAB | Facility: HOSPITAL | Age: 63
End: 2023-04-24
Payer: COMMERCIAL

## 2023-04-24 VITALS
WEIGHT: 205 LBS | HEART RATE: 99 BPM | DIASTOLIC BLOOD PRESSURE: 88 MMHG | HEIGHT: 72 IN | BODY MASS INDEX: 27.77 KG/M2 | SYSTOLIC BLOOD PRESSURE: 136 MMHG | OXYGEN SATURATION: 96 %

## 2023-04-24 DIAGNOSIS — I10 PRIMARY HYPERTENSION: Primary | ICD-10-CM

## 2023-04-24 DIAGNOSIS — Z12.5 SCREENING FOR MALIGNANT NEOPLASM OF PROSTATE: ICD-10-CM

## 2023-04-24 DIAGNOSIS — R25.2 MUSCLE CRAMPS: ICD-10-CM

## 2023-04-24 DIAGNOSIS — Z00.00 PREVENTATIVE HEALTH CARE: ICD-10-CM

## 2023-04-24 DIAGNOSIS — I73.9 LEFT LEG CLAUDICATION: ICD-10-CM

## 2023-04-24 LAB
ALBUMIN SERPL-MCNC: 4.7 G/DL (ref 3.5–5.2)
ALBUMIN/GLOB SERPL: 1.6 G/DL
ALP SERPL-CCNC: 85 U/L (ref 39–117)
ALT SERPL W P-5'-P-CCNC: 15 U/L (ref 1–41)
ANION GAP SERPL CALCULATED.3IONS-SCNC: 13.8 MMOL/L (ref 5–15)
AST SERPL-CCNC: 24 U/L (ref 1–40)
BILIRUB SERPL-MCNC: 0.6 MG/DL (ref 0–1.2)
BILIRUB UR QL STRIP: NEGATIVE
BUN SERPL-MCNC: 13 MG/DL (ref 8–23)
BUN/CREAT SERPL: 12.1 (ref 7–25)
CALCIUM SPEC-SCNC: 9.9 MG/DL (ref 8.6–10.5)
CHLORIDE SERPL-SCNC: 101 MMOL/L (ref 98–107)
CHOLEST SERPL-MCNC: 175 MG/DL (ref 0–200)
CK SERPL-CCNC: 180 U/L (ref 20–200)
CLARITY UR: CLEAR
CO2 SERPL-SCNC: 23.2 MMOL/L (ref 22–29)
COLOR UR: YELLOW
CREAT SERPL-MCNC: 1.07 MG/DL (ref 0.76–1.27)
DEPRECATED RDW RBC AUTO: 38 FL (ref 37–54)
EGFRCR SERPLBLD CKD-EPI 2021: 78 ML/MIN/1.73
ERYTHROCYTE [DISTWIDTH] IN BLOOD BY AUTOMATED COUNT: 11.8 % (ref 12.3–15.4)
GLOBULIN UR ELPH-MCNC: 2.9 GM/DL
GLUCOSE SERPL-MCNC: 109 MG/DL (ref 65–99)
GLUCOSE UR STRIP-MCNC: NEGATIVE MG/DL
HCT VFR BLD AUTO: 45 % (ref 37.5–51)
HDLC SERPL-MCNC: 63 MG/DL (ref 40–60)
HGB BLD-MCNC: 15.4 G/DL (ref 13–17.7)
HGB UR QL STRIP.AUTO: NEGATIVE
KETONES UR QL STRIP: NEGATIVE
LDLC SERPL CALC-MCNC: 95 MG/DL (ref 0–100)
LDLC/HDLC SERPL: 1.49 {RATIO}
LEUKOCYTE ESTERASE UR QL STRIP.AUTO: NEGATIVE
MCH RBC QN AUTO: 30.8 PG (ref 26.6–33)
MCHC RBC AUTO-ENTMCNC: 34.2 G/DL (ref 31.5–35.7)
MCV RBC AUTO: 90 FL (ref 79–97)
NITRITE UR QL STRIP: NEGATIVE
PH UR STRIP.AUTO: 6.5 [PH] (ref 5–8)
PLATELET # BLD AUTO: 265 10*3/MM3 (ref 140–450)
PMV BLD AUTO: 10.3 FL (ref 6–12)
POTASSIUM SERPL-SCNC: 4.8 MMOL/L (ref 3.5–5.2)
PROT SERPL-MCNC: 7.6 G/DL (ref 6–8.5)
PROT UR QL STRIP: NEGATIVE
PSA SERPL-MCNC: 0.67 NG/ML (ref 0–4)
RBC # BLD AUTO: 5 10*6/MM3 (ref 4.14–5.8)
SODIUM SERPL-SCNC: 138 MMOL/L (ref 136–145)
SP GR UR STRIP: 1.01 (ref 1–1.03)
TRIGL SERPL-MCNC: 91 MG/DL (ref 0–150)
TSH SERPL DL<=0.05 MIU/L-ACNC: 2.78 UIU/ML (ref 0.27–4.2)
URATE SERPL-MCNC: 6.6 MG/DL (ref 3.4–7)
UROBILINOGEN UR QL STRIP: NORMAL
VLDLC SERPL-MCNC: 17 MG/DL (ref 5–40)
WBC NRBC COR # BLD: 8.21 10*3/MM3 (ref 3.4–10.8)

## 2023-04-24 PROCEDURE — 81003 URINALYSIS AUTO W/O SCOPE: CPT

## 2023-04-24 PROCEDURE — 99214 OFFICE O/P EST MOD 30 MIN: CPT | Performed by: PHYSICIAN ASSISTANT

## 2023-04-24 PROCEDURE — 82550 ASSAY OF CK (CPK): CPT

## 2023-04-24 PROCEDURE — 80053 COMPREHEN METABOLIC PANEL: CPT

## 2023-04-24 PROCEDURE — 83036 HEMOGLOBIN GLYCOSYLATED A1C: CPT

## 2023-04-24 PROCEDURE — 84550 ASSAY OF BLOOD/URIC ACID: CPT

## 2023-04-24 PROCEDURE — 84443 ASSAY THYROID STIM HORMONE: CPT

## 2023-04-24 PROCEDURE — G0103 PSA SCREENING: HCPCS

## 2023-04-24 PROCEDURE — 85027 COMPLETE CBC AUTOMATED: CPT

## 2023-04-24 PROCEDURE — 80061 LIPID PANEL: CPT

## 2023-04-24 PROCEDURE — 82306 VITAMIN D 25 HYDROXY: CPT

## 2023-04-24 RX ORDER — LISINOPRIL AND HYDROCHLOROTHIAZIDE 20; 12.5 MG/1; MG/1
1 TABLET ORAL DAILY
Qty: 90 TABLET | Refills: 2 | Status: SHIPPED | OUTPATIENT
Start: 2023-04-24

## 2023-04-24 RX ORDER — ASPIRIN 81 MG/1
81 TABLET ORAL DAILY
Qty: 90 TABLET | Refills: 1 | Status: SHIPPED | OUTPATIENT
Start: 2023-04-24

## 2023-04-24 NOTE — PROGRESS NOTES
Chief Complaint   Patient presents with   • Hypertension     3 week f/u        HPI     Alex Parr is a 63 y.o. male who is here for follow-up of hypertension.     Hydrochlorothiazide 12.5 mg daily was added after his last visit. He is tolerating this well.   He did get a new blood pressure cuff but cannot recall any measurements since his last visit.     He continues to have left hip pain with exertion for 6 months. He has pain after mowing several lines of his hard and after walking about 100 yards. No pain with walking initially or pain below the knee. He does have some left lower back pain. Sometimes he has numbness and burning in his thigh. Symptoms resolve with resting for 5 minutes. An x-ray of his left hip was normal in February. He is a former smoker. He has venous duplex ultrasounds of his RUE and bilateral lower extremities scheduled for next month.     Past Medical History:   Diagnosis Date   • Gout    • Hyperlipidemia    • Hypertension        Past Surgical History:   Procedure Laterality Date   • INGUINAL HERNIA REPAIR  2001       Family History   Problem Relation Age of Onset   • No Known Problems Mother    • Parkinsonism Father 80   • Hypertension Father        Social History     Socioeconomic History   • Marital status:    Tobacco Use   • Smoking status: Former     Packs/day: 0.00     Years: 35.00     Pack years: 0.00     Types: Cigarettes     Quit date: 1/22/2020     Years since quitting: 3.2   • Smokeless tobacco: Never   Vaping Use   • Vaping Use: Never used   Substance and Sexual Activity   • Alcohol use: Yes     Alcohol/week: 14.0 standard drinks     Types: 14 Glasses of wine per week     Comment: 2 glasses of wine/night   • Drug use: No   • Sexual activity: Never       No Known Allergies    ROS    Review of Systems   Eyes: Negative for blurred vision.   Respiratory: Negative for shortness of breath.    Cardiovascular: Negative for chest pain.   Musculoskeletal: Positive for  arthralgias.   Neurological: Negative for dizziness, light-headedness and headache.       Vitals:    04/24/23 1121   BP: 136/88   Pulse: 99   SpO2: 96%     Body mass index is 27.8 kg/m².      Current Outpatient Medications:   •  cholecalciferol (VITAMIN D3) 25 MCG (1000 UT) tablet, Take 2 tablets by mouth Daily., Disp: , Rfl:   •  famotidine (PEPCID) 20 MG tablet, Take 1 tablet by mouth Every Night., Disp: 90 tablet, Rfl: 3  •  lisinopril-hydrochlorothiazide (PRINZIDE,ZESTORETIC) 20-12.5 MG per tablet, Take 1 tablet by mouth Daily., Disp: 90 tablet, Rfl: 2  •  rosuvastatin (CRESTOR) 20 MG tablet, Take 1 tablet by mouth Daily., Disp: 90 tablet, Rfl: 3  •  vitamin B-12 (CYANOCOBALAMIN) 1000 MCG tablet, Take 1 tablet by mouth Daily., Disp: , Rfl:   •  aspirin 81 MG EC tablet, Take 1 tablet by mouth Daily., Disp: 90 tablet, Rfl: 1    PE    Physical Exam  Vitals reviewed.   Constitutional:       General: He is not in acute distress.     Appearance: He is well-developed.   HENT:      Head: Normocephalic and atraumatic.   Eyes:      Conjunctiva/sclera: Conjunctivae normal.   Cardiovascular:      Rate and Rhythm: Normal rate and regular rhythm.      Pulses:           Dorsalis pedis pulses are 1+ on the right side and 0 on the left side.        Posterior tibial pulses are 0 on the right side and 0 on the left side.      Heart sounds: Normal heart sounds. No murmur heard.  Pulmonary:      Effort: Pulmonary effort is normal.      Breath sounds: Normal breath sounds.   Musculoskeletal:      Cervical back: Normal range of motion.   Feet:      Comments: Pedal pulses are difficult to palpate. Bilateral feet are warm to the touch, normal skin color.   Skin:     General: Skin is warm and dry.   Neurological:      Mental Status: He is alert.      Gait: Gait normal.   Psychiatric:         Speech: Speech normal.         Behavior: Behavior normal.         Results    Results for orders placed or performed in visit on 04/04/22   Uric acid     Specimen: Blood    Blood  Release to garret   Result Value Ref Range    Uric Acid 7.9 (H) 3.4 - 7.0 mg/dL   Vitamin D 25 Hydroxy    Specimen: Blood    Blood  Release to garret   Result Value Ref Range    25 Hydroxy, Vitamin D 39.9 30.0 - 100.0 ng/ml   PSA Screen    Specimen: Blood    Blood  Release to garret   Result Value Ref Range    PSA 0.616 0.000 - 4.000 ng/mL   Hemoglobin A1c    Specimen: Blood    Blood  Release to garret   Result Value Ref Range    Hemoglobin A1C 6.00 (H) 4.80 - 5.60 %   Urinalysis With Culture If Indicated - Urine, Clean Catch    Specimen: Urine, Clean Catch    Urine  Release to garret   Result Value Ref Range    Specific Gravity, UA 1.018 1.005 - 1.030    pH, UA 5.5 5.0 - 7.5    Color, UA Yellow Yellow    Appearance, UA Clear Clear    Leukocytes, UA Negative Negative    Protein Negative Negative/Trace    Glucose, UA Negative Negative    Ketones Negative Negative    Blood, UA Negative Negative    Bilirubin, UA Negative Negative    Urobilinogen, UA 0.2 0.2 - 1.0 mg/dL    Nitrite, UA Negative Negative    Microscopic Examination Comment     Microscopic Examination See below:     Urinalysis Reflex Comment    TSH Rfx On Abnormal To Free T4    Specimen: Blood    Blood  Release to garret   Result Value Ref Range    TSH 1.680 0.270 - 4.200 uIU/mL   Lipid Panel    Specimen: Blood    Blood  Release to garret   Result Value Ref Range    Total Cholesterol 177 0 - 200 mg/dL    Triglycerides 88 0 - 150 mg/dL    HDL Cholesterol 50 40 - 60 mg/dL    VLDL Cholesterol Jermaine 16 5 - 40 mg/dL    LDL Chol Calc ([facility]) 111 (H) 0 - 100 mg/dL   Comprehensive Metabolic Panel    Specimen: Blood    Blood  Release to garret   Result Value Ref Range    Glucose 95 65 - 99 mg/dL    BUN 12 8 - 23 mg/dL    Creatinine 1.02 0.76 - 1.27 mg/dL    EGFR Result 83.1 >60.0 mL/min/1.73    BUN/Creatinine Ratio 11.8 7.0 - 25.0    Sodium 139 136 - 145 mmol/L    Potassium 5.0 3.5 - 5.2 mmol/L    Chloride 105 98 - 107 mmol/L    Total CO2 21.8 (L) 22.0 -  29.0 mmol/L    Calcium 9.6 8.6 - 10.5 mg/dL    Total Protein 7.3 6.0 - 8.5 g/dL    Albumin 4.80 3.50 - 5.20 g/dL    Globulin 2.5 gm/dL    A/G Ratio 1.9 g/dL    Total Bilirubin 0.4 0.0 - 1.2 mg/dL    Alkaline Phosphatase 88 39 - 117 U/L    AST (SGOT) 23 1 - 40 U/L    ALT (SGPT) 13 1 - 41 U/L   CBC (No Diff)    Specimen: Blood    Blood  Release to garret   Result Value Ref Range    WBC 7.18 3.40 - 10.80 10*3/mm3    RBC 5.04 4.14 - 5.80 10*6/mm3    Hemoglobin 15.3 13.0 - 17.7 g/dL    Hematocrit 44.3 37.5 - 51.0 %    MCV 87.9 79.0 - 97.0 fL    MCH 30.4 26.6 - 33.0 pg    MCHC 34.5 31.5 - 35.7 g/dL    RDW 11.9 (L) 12.3 - 15.4 %    Platelets 249 140 - 450 10*3/mm3   Microscopic Examination -    Urine  Release to garret   Result Value Ref Range    WBC, UA None seen 0 - 5 /hpf    RBC, UA None seen 0 - 2 /hpf    Epithelial Cells (non renal) None seen 0 - 10 /hpf    Casts None seen None seen /lpf    Bacteria, UA None seen None seen/Few /hpf       A/P    Problem List Items Addressed This Visit        Cardiac and Vasculature    Hypertension - Primary    Current Assessment & Plan     Improved blood pressure control.  Continue lisinopril hydrochlorothiazide.  Patient will complete labs today we requested previously.  RTC in 5 months for follow-up, sooner prn.          Relevant Medications    lisinopril-hydrochlorothiazide (PRINZIDE,ZESTORETIC) 20-12.5 MG per tablet   Other Visit Diagnoses     Left leg claudication        Neurogenic vs PAD. Proceed with ultrasounds next month as scheduled. Start aspirin 81 mg qd.   Consider lumbar MRI pending results.           Plan of care was reviewed with patient at the conclusion of today's visit. Education was provided regarding diagnoses, management, and the importance of keeping follow-up appointments. The patient was counseled regarding the risks, benefits, and possible side-effects of treatment. Patient and/or family express understanding and agreement with the management plan.        Zaid  ABDIEL Pena

## 2023-04-24 NOTE — ASSESSMENT & PLAN NOTE
Improved blood pressure control.  Continue lisinopril hydrochlorothiazide.  Patient will complete labs today we requested previously.  RTC in 5 months for follow-up, sooner prn.

## 2023-04-25 LAB
25(OH)D3 SERPL-MCNC: 36.4 NG/ML (ref 30–100)
HBA1C MFR BLD: 5.5 % (ref 4.8–5.6)

## 2023-05-15 ENCOUNTER — HOSPITAL ENCOUNTER (OUTPATIENT)
Dept: CARDIOLOGY | Facility: HOSPITAL | Age: 63
Discharge: HOME | End: 2023-05-15
Payer: COMMERCIAL

## 2023-05-15 VITALS — WEIGHT: 205 LBS | HEIGHT: 72 IN | BODY MASS INDEX: 27.77 KG/M2

## 2023-05-15 DIAGNOSIS — I73.9 PAD (PERIPHERAL ARTERY DISEASE): ICD-10-CM

## 2023-05-15 LAB
BH CV LOWER ARTERIAL LEFT ABI RATIO: 0.69
BH CV LOWER ARTERIAL LEFT CALF RATIO: 0.7
BH CV LOWER ARTERIAL LEFT DORSALIS PEDIS SYS MAX: 90
BH CV LOWER ARTERIAL LEFT GREAT TOE SYS MAX: 58
BH CV LOWER ARTERIAL LEFT LOW THIGH RATIO: 0.74
BH CV LOWER ARTERIAL LEFT LOW THIGH SYS MAX: 102
BH CV LOWER ARTERIAL LEFT POPLITEAL SYS MAX: 96
BH CV LOWER ARTERIAL LEFT POST TIBIAL SYS MAX: 95
BH CV LOWER ARTERIAL LEFT TBI RATIO: 0.42
BH CV LOWER ARTERIAL RIGHT ABI RATIO: 1.12
BH CV LOWER ARTERIAL RIGHT CALF RATIO: 1.15
BH CV LOWER ARTERIAL RIGHT DORSALIS PEDIS SYS MAX: 145
BH CV LOWER ARTERIAL RIGHT GREAT TOE SYS MAX: 104
BH CV LOWER ARTERIAL RIGHT LOW THIGH RATIO: 1.21
BH CV LOWER ARTERIAL RIGHT LOW THIGH SYS MAX: 166
BH CV LOWER ARTERIAL RIGHT POPLITEAL SYS MAX: 157
BH CV LOWER ARTERIAL RIGHT POST TIBIAL SYS MAX: 154
BH CV LOWER ARTERIAL RIGHT TBI RATIO: 0.76
BH CV UPPER DUPLEX LEFT AXILLARY ART PSV: 68 CM/S
BH CV UPPER DUPLEX LEFT BRACHIAL ART PSV: 65 CM/S
BH CV UPPER DUPLEX LEFT RADIAL ART PSV: 41 CM/S
BH CV UPPER DUPLEX LEFT SUBCLAVIAN  ART PSV: 275 CM/S
BH CV UPPER DUPLEX LEFT ULNAR ARTERY PSV: 54 CM/S
BH CV XLRA MEAS LEFT PROX SCLA PSV: 276.4 CM/SEC
MAXIMAL PREDICTED HEART RATE: 157 BPM
STRESS TARGET HR: 133 BPM
UPPER ARTERIAL LEFT ARM BRACHIAL SYS MAX: 107 MMHG
UPPER ARTERIAL LEFT ARM BRACHIAL SYS MAX: 116 MMHG
UPPER ARTERIAL RIGHT ARM BRACHIAL SYS MAX: 134 MMHG
UPPER ARTERIAL RIGHT ARM BRACHIAL SYS MAX: 137 MMHG

## 2023-05-15 PROCEDURE — 93931 UPPER EXTREMITY STUDY: CPT

## 2023-05-15 PROCEDURE — 93923 UPR/LXTR ART STDY 3+ LVLS: CPT

## 2023-05-15 PROCEDURE — 93923 UPR/LXTR ART STDY 3+ LVLS: CPT | Performed by: INTERNAL MEDICINE

## 2023-05-19 DIAGNOSIS — I77.1 SUBCLAVIAN ARTERY STENOSIS, LEFT: Primary | ICD-10-CM

## 2023-05-19 DIAGNOSIS — I73.9 PERIPHERAL ARTERY DISEASE: ICD-10-CM

## 2023-05-19 RX ORDER — ASPIRIN 81 MG/1
81 TABLET ORAL DAILY
Qty: 90 TABLET | Refills: 3 | Status: SHIPPED | OUTPATIENT
Start: 2023-05-19

## 2023-05-19 RX ORDER — ROSUVASTATIN CALCIUM 40 MG/1
40 TABLET, COATED ORAL NIGHTLY
Qty: 90 TABLET | Refills: 3 | Status: SHIPPED | OUTPATIENT
Start: 2023-05-19

## 2023-06-06 ENCOUNTER — OFFICE VISIT (OUTPATIENT)
Dept: CARDIOLOGY | Facility: HOSPITAL | Age: 63
End: 2023-06-06
Payer: COMMERCIAL

## 2023-06-06 VITALS
OXYGEN SATURATION: 94 % | TEMPERATURE: 97.8 F | BODY MASS INDEX: 27.81 KG/M2 | HEIGHT: 72 IN | DIASTOLIC BLOOD PRESSURE: 76 MMHG | RESPIRATION RATE: 20 BRPM | WEIGHT: 205.31 LBS | SYSTOLIC BLOOD PRESSURE: 112 MMHG | HEART RATE: 81 BPM

## 2023-06-06 DIAGNOSIS — E78.2 MIXED HYPERLIPIDEMIA: ICD-10-CM

## 2023-06-06 DIAGNOSIS — I77.1 SUBCLAVIAN ARTERY STENOSIS, LEFT: Primary | ICD-10-CM

## 2023-06-06 DIAGNOSIS — I73.9 PAD (PERIPHERAL ARTERY DISEASE): ICD-10-CM

## 2023-06-06 DIAGNOSIS — I10 HYPERTENSION, UNSPECIFIED TYPE: ICD-10-CM

## 2023-06-06 RX ORDER — MAGNESIUM GLUCONATE 27 MG(500)
500 TABLET ORAL 2 TIMES DAILY
COMMUNITY

## 2023-06-06 NOTE — PROGRESS NOTES
"Chief Complaint  Hypertension (artery disease) and Establish Care    Subjective    History of Present Illness {CC  Problem List  Visit  Diagnosis   Encounters  Notes  Medications  Labs  Result Review Imaging  Media :23}       History of Present Illness     The patient is a 63 year old male who presents to our clinic today for evaluation of hypertension with variations in blood pressure readings between left and right arms. Lower and upper extremity arterial doppler on 5/15/2023 showed normal ENEIDA on right, and left ENEIDA is moderately reduced, waveforms are consistent with aorto-iliac disease, brachial blood pressure gradient is greater than 15mmHG suggestive of left subclavian artery disease. There is currently almost a 30 point difference in blood pressure readings between left and right arm. Patient denies any chest pain, dyspnea, palpitations, dizziness, presyncope or syncope.  Patient does report that he is unable to ambulate 50 yards without experiencing pain in his left hip as well as weakness in his left leg.  Also reports some numbness in his left thigh.  Respiratory recently increased from 20 to 40 mg daily    Objective     Vital Signs:   Vitals:    06/06/23 0832 06/06/23 0834 06/06/23 0835   BP: 134/82 106/78 112/76   BP Location: Right arm Left arm Left arm   Patient Position: Sitting Standing Sitting   Cuff Size: Adult Adult Adult   Pulse: 77 86 81   Resp:   20   Temp:   97.8 °F (36.6 °C)   TempSrc:   Temporal   SpO2: 95% 95% 94%   Weight:   93.1 kg (205 lb 5 oz)   Height:   182.9 cm (72\")     Body mass index is 27.85 kg/m².  Physical Exam  Vitals and nursing note reviewed.   Constitutional:       Appearance: Normal appearance.   HENT:      Head: Normocephalic.   Eyes:      Pupils: Pupils are equal, round, and reactive to light.   Cardiovascular:      Rate and Rhythm: Normal rate and regular rhythm.      Pulses: Normal pulses.           Dorsalis pedis pulses are 1+ on the left side.        " Posterior tibial pulses are 1+ on the left side.      Heart sounds: Normal heart sounds. No murmur heard.  Pulmonary:      Effort: Pulmonary effort is normal.      Breath sounds: Normal breath sounds.   Abdominal:      General: Bowel sounds are normal.      Palpations: Abdomen is soft.   Musculoskeletal:         General: Normal range of motion.      Cervical back: Normal range of motion.      Right lower leg: No edema.      Left lower leg: No edema.   Skin:     General: Skin is warm and dry.      Capillary Refill: Capillary refill takes less than 2 seconds.   Neurological:      Mental Status: He is alert and oriented to person, place, and time.   Psychiatric:         Mood and Affect: Mood normal.         Thought Content: Thought content normal.            Result Review  Data Reviewed:{ Labs  Result Review  Imaging  Med Tab  Media :23}     Duplex Upper Extremity Art / Grafts - Left CAR (05/15/2023 12:49)  Doppler Arterial Multi Level Lower Extremity - Bilateral CAR (05/15/2023 10:26)  ECG 12 Lead (08/10/2020 12:00)     CK (04/24/2023 12:00)  Hemoglobin A1c (04/24/2023 12:00)  Uric acid (04/24/2023 12:00)  Vitamin D,25-Hydroxy (04/24/2023 12:00)  PSA Screen (04/24/2023 12:00)  Urinalysis With Culture If Indicated - Urine, Clean Catch (04/24/2023 12:00)  TSH Rfx On Abnormal To Free T4 (04/24/2023 12:00)  Lipid Panel (04/24/2023 12:00)  Comprehensive Metabolic Panel (04/24/2023 12:00)  CBC (No Diff) (04/24/2023 12:00)           Assessment and Plan {CC Problem List  Visit Diagnosis  ROS  Review (Popup)  Health Maintenance  Quality  BestPractice  Medications  SmartSets  SnapShot Encounters  Media :23}   1. Subclavian artery stenosis, left  Continue asa, crestor   - Ambulatory Referral to Cardiothoracic Surgery urgent   - Ambulatory Referral to Cardiology    2. PAD (peripheral artery disease)  Continue asa, crestor   - Adult Transthoracic Echo Complete W/ Cont if Necessary Per Protocol; Future  - Ambulatory  Referral to Cardiothoracic Surgery  - Ambulatory Referral to Cardiology    3. Hypertension, unspecified type  Stable on zestoretic   - Adult Transthoracic Echo Complete W/ Cont if Necessary Per Protocol; Future  - Ambulatory Referral to Cardiothoracic Surgery  - Ambulatory Referral to Cardiology    4. Mixed hyperlipidemia  Stable on crestor   - Adult Transthoracic Echo Complete W/ Cont if Necessary Per Protocol; Future  - Ambulatory Referral to Cardiothoracic Surgery  - Ambulatory Referral to Cardiology          Follow Up {Instructions Charge Capture  Follow-up Communications :23}   Return if symptoms worsen or fail to improve.    Patient was given instructions and counseling regarding his condition or for health maintenance advice. Please see specific information pulled into the AVS if appropriate.  Patient was instructed to call the [facility] with any questions, concerns, or worsening symptoms.

## 2023-06-06 NOTE — PROGRESS NOTES
[facility] Cardiology  [address], [address]  [place], [place], [address]    [phone]  WWW.[facility]           OUTPATIENT CLINIC CONSULTATION NOTE    Patient care team:  Patient Care Team:  Zaid Pena PA as PCP - General (Physician Assistant)  Khalif Serna MD as Consulting Physician (Gastroenterology)  Brandon Guo MD as Consulting Physician (Cardiology)    Requesting Provider and Reason for consultation: The patient is being seen today at the request of JAGDISH Medina for PAD.     Subjective:   Chief complaint:   Chief Complaint   Patient presents with    [facility]     New Patient     HPI:    Alex Parr is a 63 y.o. male.  Cardiac focused problem list:  Hypertension  Hyperlipidemia  PAD  Lower extremity arterial duplex, 5/15/2023: Right ENEIDA is normal, normal digital pressures.  Left ENEIDA is moderately reduced, waveforms consistent with aortic iliac disease, abnormal digital pressures.  Brachial blood pressure gradient is greater than 15 mmHg, suggestive of left subclavian artery disease.  Left upper extremity arterial duplex, 5/15/2023: High-grade stenosis of the left proximal left subclavian artery noted with retrograde left vertebral flow.  Rest of the left upper extremity arteries are free of significant disease.  Left subclavian artery stenosis  Osteoarthritis  Prior left shoulder injection  Left hip bursitis   status post injections.  Historically has helped.  Repeat injection 01/2023 did not help  GERD  Gout  Former nicotine dependence  Smoked for 30 years, quit in 2020    Patient presents today in consultation for an interventional cardiology consultation    Claudication after less than 150 feet with pain in his left hip and weakness in his left leg.  Associated left thigh numbness.  Started around January.  Prior to that, he was ambulatory and active without claudication-like symptoms    Also with transient, mild lightheadedness, more  pronounced at times after taking blood pressure medicines.  His left arm becomes tight and fatigued with activities in a way that his right arm does not.  Symptoms are not similar to his left shoulder discomfort that previously improved after steroid injection.    Denies chest pain, dyspnea.  No prior cardiac testing.    Review of Systems:  As noted above in the HPI    PFSH:  Patient Active Problem List   Diagnosis    Essential hypertension    Hyperlipidemia    Gout    Personal history of nicotine dependence    History of colonic polyps    Diverticulosis of large intestine    Internal hemorrhoids    Vitamin D deficiency    Impaired glucose tolerance    Gastroesophageal reflux disease    Bilateral carpal tunnel syndrome    PAD (peripheral artery disease)    Subclavian artery stenosis, left         Current Outpatient Medications:     aspirin 81 MG EC tablet, Take 1 tablet by mouth Daily., Disp: 90 tablet, Rfl: 3    cholecalciferol (VITAMIN D3) 25 MCG (1000 UT) tablet, Take 1 tablet by mouth Daily., Disp: , Rfl:     famotidine (PEPCID) 20 MG tablet, Take 1 tablet by mouth Every Night., Disp: 90 tablet, Rfl: 3    lisinopril-hydrochlorothiazide (PRINZIDE,ZESTORETIC) 20-12.5 MG per tablet, Take 1 tablet by mouth Daily., Disp: 90 tablet, Rfl: 2    magnesium gluconate (MAGONATE) 500 MG tablet, Take 500 mg by mouth 2 (Two) Times a Day., Disp: , Rfl:     rosuvastatin (CRESTOR) 40 MG tablet, Take 1 tablet by mouth Every Night., Disp: 90 tablet, Rfl: 3    vitamin B-12 (CYANOCOBALAMIN) 1000 MCG tablet, Take 1 tablet by mouth Daily., Disp: , Rfl:     cilostazol (PLETAL) 50 MG tablet, Take 1 tablet by mouth 2 (Two) Times a Day., Disp: 180 tablet, Rfl: 0    No Known Allergies    Social History     Socioeconomic History    Marital status:    Tobacco Use    Smoking status: Former     Packs/day: 0.50     Years: 35.00     Pack years: 17.50     Types: Cigarettes     Start date: 1980     Quit date: 1/22/2020     Years since  "quitting: 3.3    Smokeless tobacco: Never   Vaping Use    Vaping Use: Never used   Substance and Sexual Activity    Alcohol use: Yes     Alcohol/week: 14.0 standard drinks     Types: 14 Glasses of wine per week     Comment: 2 glasses of wine/night    Drug use: No    Sexual activity: Never     Family History   Problem Relation Age of Onset    No Known Problems Mother     Stroke Father     Parkinsonism Father 80    Hypertension Father     ALS Maternal Grandmother     No Known Problems Maternal Grandfather     No Known Problems Paternal Grandmother     No Known Problems Paternal Grandfather          Objective:   Physical Exam:  /78 (BP Location: Left arm, Patient Position: Sitting)   Pulse 96   Ht 185.4 cm (73\")   Wt 90.7 kg (200 lb)   SpO2 98%   BMI 26.39 kg/m²   CONSTITUTIONAL: No acute distress  RESPIRATORY: Normal effort. Clear to auscultation bilaterally without wheezing or rales  CARDIOVASCULAR: Regular rate and rhythm with normal S1 and S2. Without murmur.  PERIPHERAL VASCULAR: No carotid bruit bilaterally.  There is no lower extremity edema bilaterally.    6/2023 heart valve clinic: Left arm systolic BP 30 mmHg lower than right  6/2023 exam: Right DP 2+, right PT 1+, left pedal pulses nonpalpable.  Faint left radial pulse, nonpalpable left ulnar pulse.  Normal right radial pulse    Labs:  Labs reviewed by myself    Lab Results   Component Value Date    CHOL 175 04/24/2023     Lab Results   Component Value Date    TRIG 91 04/24/2023     Lab Results   Component Value Date    HDL 63 (H) 04/24/2023     Lab Results   Component Value Date    LDL 95 04/24/2023     No components found for: LDLDIRECTC    Diagnostic Data:      ECG 12 Lead    Date/Time: 6/12/2023 12:40 PM  Performed by: Brandon Guo MD  Authorized by: Brandon Guo MD   Comparison: compared with previous ECG from 8/10/2020  Similar to previous ECG  Rhythm: sinus rhythm        Results for orders placed during the hospital encounter of " 06/07/23    Adult Transthoracic Echo Complete W/ Cont if Necessary Per Protocol    Interpretation Summary    Left ventricular systolic function is normal. Left ventricular ejection fraction appears to be 61 - 65%.    The right ventricular cavity is borderline dilated.    The right atrial cavity is borderline dilated.      Assessment and Plan:     Lower extremity PAD, claudication  Upper extremity PAD, subclavian steal syndrome  Essential hypertension  Mixed hyperlipidemia   Former tobacco dependence  -Continue aspirin, statin, blood pressure medication  -Trial of cilostazol 50 mg twice daily  -If with persistent symptoms, discussed proceeding with peripheral angiography and possible intervention of the left lower extremity arteries.  Right femoral artery access with preparation for possible additional left femoral artery access.  -Plan for left subclavian artery angiography on the same day.  If warranting intervention, would likely schedule for second procedure  -Risk versus benefits of an invasive approach discussed with the patient.  He is agreeable to proceed if warranted/needed  -Continue to abstain from nicotine products    - No follow-ups on file.

## 2023-06-07 ENCOUNTER — HOSPITAL ENCOUNTER (OUTPATIENT)
Dept: CARDIOLOGY | Facility: HOSPITAL | Age: 63
Discharge: HOME | End: 2023-06-07
Admitting: NURSE PRACTITIONER
Payer: COMMERCIAL

## 2023-06-07 VITALS — WEIGHT: 205 LBS | HEIGHT: 72 IN | BODY MASS INDEX: 27.77 KG/M2

## 2023-06-07 DIAGNOSIS — I73.9 PAD (PERIPHERAL ARTERY DISEASE): ICD-10-CM

## 2023-06-07 DIAGNOSIS — I10 HYPERTENSION, UNSPECIFIED TYPE: ICD-10-CM

## 2023-06-07 DIAGNOSIS — E78.2 MIXED HYPERLIPIDEMIA: ICD-10-CM

## 2023-06-07 LAB
BH CV ECHO MEAS - AO MAX PG: 8.6 MMHG
BH CV ECHO MEAS - AO MEAN PG: 4.3 MMHG
BH CV ECHO MEAS - AO ROOT DIAM: 4 CM
BH CV ECHO MEAS - AO V2 MAX: 146.3 CM/SEC
BH CV ECHO MEAS - AO V2 VTI: 29.3 CM
BH CV ECHO MEAS - AVA(I,D): 2.37 CM2
BH CV ECHO MEAS - EDV(CUBED): 59.3 ML
BH CV ECHO MEAS - EDV(MOD-SP2): 72.5 ML
BH CV ECHO MEAS - EDV(MOD-SP4): 105 ML
BH CV ECHO MEAS - EF(MOD-BP): 65.1 %
BH CV ECHO MEAS - EF(MOD-SP2): 65.4 %
BH CV ECHO MEAS - EF(MOD-SP4): 64.5 %
BH CV ECHO MEAS - ESV(CUBED): 17.6 ML
BH CV ECHO MEAS - ESV(MOD-SP2): 25.1 ML
BH CV ECHO MEAS - ESV(MOD-SP4): 37.3 ML
BH CV ECHO MEAS - FS: 33.3 %
BH CV ECHO MEAS - IVS/LVPW: 0.76 CM
BH CV ECHO MEAS - IVSD: 0.8 CM
BH CV ECHO MEAS - LA DIMENSION: 3.7 CM
BH CV ECHO MEAS - LAT PEAK E' VEL: 12 CM/SEC
BH CV ECHO MEAS - LV DIASTOLIC VOL/BSA (35-75): 48.8 CM2
BH CV ECHO MEAS - LV MASS(C)D: 109.4 GRAMS
BH CV ECHO MEAS - LV MAX PG: 3.4 MMHG
BH CV ECHO MEAS - LV MEAN PG: 2 MMHG
BH CV ECHO MEAS - LV SYSTOLIC VOL/BSA (12-30): 17.3 CM2
BH CV ECHO MEAS - LV V1 MAX: 92.7 CM/SEC
BH CV ECHO MEAS - LV V1 VTI: 20.1 CM
BH CV ECHO MEAS - LVIDD: 3.9 CM
BH CV ECHO MEAS - LVIDS: 2.6 CM
BH CV ECHO MEAS - LVOT AREA: 3.5 CM2
BH CV ECHO MEAS - LVOT DIAM: 2.1 CM
BH CV ECHO MEAS - LVPWD: 1.05 CM
BH CV ECHO MEAS - MED PEAK E' VEL: 8.4 CM/SEC
BH CV ECHO MEAS - MV A MAX VEL: 55.8 CM/SEC
BH CV ECHO MEAS - MV DEC SLOPE: 308 CM/SEC2
BH CV ECHO MEAS - MV DEC TIME: 0.18 MSEC
BH CV ECHO MEAS - MV E MAX VEL: 69.6 CM/SEC
BH CV ECHO MEAS - MV E/A: 1.25
BH CV ECHO MEAS - MV P1/2T: 74.5 MSEC
BH CV ECHO MEAS - MVA(P1/2T): 3 CM2
BH CV ECHO MEAS - PA ACC TIME: 0.12 SEC
BH CV ECHO MEAS - PA PR(ACCEL): 26.8 MMHG
BH CV ECHO MEAS - PA V2 MAX: 89.3 CM/SEC
BH CV ECHO MEAS - RAP SYSTOLE: 3 MMHG
BH CV ECHO MEAS - RVSP: 19 MMHG
BH CV ECHO MEAS - SI(MOD-SP2): 22 ML/M2
BH CV ECHO MEAS - SI(MOD-SP4): 31.4 ML/M2
BH CV ECHO MEAS - SV(LVOT): 69.6 ML
BH CV ECHO MEAS - SV(MOD-SP2): 47.4 ML
BH CV ECHO MEAS - SV(MOD-SP4): 67.7 ML
BH CV ECHO MEAS - TAPSE (>1.6): 1.91 CM
BH CV ECHO MEAS - TR MAX PG: 16.2 MMHG
BH CV ECHO MEAS - TR MAX VEL: 201 CM/SEC
BH CV ECHO MEASUREMENTS AVERAGE E/E' RATIO: 6.82
BH CV VAS BP LEFT ARM: NORMAL MMHG
BH CV VAS BP RIGHT ARM: NORMAL MMHG
BH CV XLRA - RV BASE: 4.7 CM
BH CV XLRA - RV LENGTH: 8.1 CM
BH CV XLRA - RV MID: 3.7 CM
BH CV XLRA - TDI S': 11.4 CM/SEC
IVRT: 83 MSEC
LEFT ATRIUM VOLUME INDEX: 24.7 ML/M2
LV EF 2D ECHO EST: 65 %

## 2023-06-07 PROCEDURE — 93306 TTE W/DOPPLER COMPLETE: CPT

## 2023-06-07 PROCEDURE — 93306 TTE W/DOPPLER COMPLETE: CPT | Performed by: INTERNAL MEDICINE

## 2023-06-08 ENCOUNTER — TELEPHONE (OUTPATIENT)
Dept: CARDIOLOGY | Facility: HOSPITAL | Age: 63
End: 2023-06-08
Payer: COMMERCIAL

## 2023-06-12 ENCOUNTER — PATIENT ROUNDING (BHMG ONLY) (OUTPATIENT)
Dept: CARDIOLOGY | Facility: CLINIC | Age: 63
End: 2023-06-12

## 2023-06-12 ENCOUNTER — OFFICE VISIT (OUTPATIENT)
Dept: CARDIOLOGY | Facility: CLINIC | Age: 63
End: 2023-06-12
Payer: COMMERCIAL

## 2023-06-12 VITALS
DIASTOLIC BLOOD PRESSURE: 78 MMHG | HEIGHT: 73 IN | HEART RATE: 96 BPM | SYSTOLIC BLOOD PRESSURE: 124 MMHG | OXYGEN SATURATION: 98 % | BODY MASS INDEX: 26.51 KG/M2 | WEIGHT: 200 LBS

## 2023-06-12 DIAGNOSIS — I73.9 PAD (PERIPHERAL ARTERY DISEASE): Primary | ICD-10-CM

## 2023-06-12 DIAGNOSIS — E78.2 MIXED HYPERLIPIDEMIA: ICD-10-CM

## 2023-06-12 DIAGNOSIS — I10 ESSENTIAL HYPERTENSION: ICD-10-CM

## 2023-06-12 RX ORDER — CILOSTAZOL 50 MG/1
50 TABLET ORAL 2 TIMES DAILY
Qty: 180 TABLET | Refills: 0 | Status: SHIPPED | OUTPATIENT
Start: 2023-06-12

## 2023-06-12 NOTE — PROGRESS NOTES
June 12, 2023    Hello, may I speak with Alex Parr? Yes.    My name is Dyana JETER      I am  with [facility]  [address]  [place] [facility] [address] [phone].    Before we get started may I verify your date of birth? 1960,yes, correct.    I am calling to officially welcome you to our practice and ask about your recent visit. Is this a good time to talk? Yes.    Tell me about your visit with us. What things went well?  Everything.        We're always looking for ways to make our patients' experiences even better. Do you have recommendations on ways we may improve?  No.  Everything went very smoothly.    Overall were you satisfied with your first visit to our practice? Yes.       I appreciate you taking the time to speak with me today. Is there anything else I can do for you? No.      Thank you, and have a great day.

## 2023-06-19 ENCOUNTER — PREP FOR SURGERY (OUTPATIENT)
Dept: CARDIOLOGY | Facility: CLINIC | Age: 63
End: 2023-06-19
Payer: COMMERCIAL

## 2023-06-19 DIAGNOSIS — I73.9 PAD (PERIPHERAL ARTERY DISEASE): Primary | ICD-10-CM

## 2023-06-19 RX ORDER — SODIUM CHLORIDE 0.9 % (FLUSH) 0.9 %
10 SYRINGE (ML) INJECTION AS NEEDED
Status: CANCELLED | OUTPATIENT
Start: 2023-06-19

## 2023-06-19 RX ORDER — SODIUM CHLORIDE 0.9 % (FLUSH) 0.9 %
10 SYRINGE (ML) INJECTION EVERY 12 HOURS SCHEDULED
Status: CANCELLED | OUTPATIENT
Start: 2023-06-19

## 2023-06-19 RX ORDER — ASPIRIN 81 MG/1
324 TABLET, CHEWABLE ORAL ONCE
Status: CANCELLED | OUTPATIENT
Start: 2023-06-19 | End: 2023-06-19

## 2023-06-19 RX ORDER — SODIUM CHLORIDE 9 MG/ML
40 INJECTION, SOLUTION INTRAVENOUS AS NEEDED
Status: CANCELLED | OUTPATIENT
Start: 2023-06-19

## 2023-06-19 RX ORDER — ASPIRIN 81 MG/1
81 TABLET ORAL DAILY
Status: CANCELLED | OUTPATIENT
Start: 2023-06-20

## 2023-07-20 ENCOUNTER — HOSPITAL ENCOUNTER (OUTPATIENT)
Facility: HOSPITAL | Age: 63
Setting detail: HOSPITAL OUTPATIENT SURGERY
Discharge: HOME | End: 2023-07-20
Attending: INTERNAL MEDICINE | Admitting: INTERNAL MEDICINE
Payer: COMMERCIAL

## 2023-07-20 ENCOUNTER — APPOINTMENT (OUTPATIENT)
Dept: CARDIOLOGY | Facility: HOSPITAL | Age: 63
End: 2023-07-20
Payer: COMMERCIAL

## 2023-07-20 VITALS
TEMPERATURE: 97.7 F | HEIGHT: 73 IN | RESPIRATION RATE: 16 BRPM | BODY MASS INDEX: 26.37 KG/M2 | HEART RATE: 74 BPM | SYSTOLIC BLOOD PRESSURE: 124 MMHG | DIASTOLIC BLOOD PRESSURE: 71 MMHG | OXYGEN SATURATION: 97 % | WEIGHT: 199 LBS

## 2023-07-20 DIAGNOSIS — I77.1 SUBCLAVIAN ARTERY STENOSIS, LEFT: ICD-10-CM

## 2023-07-20 LAB
ACT BLD: 305 SECONDS (ref 82–152)
ACT BLD: 371 SECONDS (ref 82–152)
ALBUMIN SERPL-MCNC: 4.3 G/DL (ref 3.5–5.2)
ALBUMIN/GLOB SERPL: 1.7 G/DL
ALP SERPL-CCNC: 83 U/L (ref 39–117)
ALT SERPL W P-5'-P-CCNC: 6 U/L (ref 1–41)
ANION GAP SERPL CALCULATED.3IONS-SCNC: 13 MMOL/L (ref 5–15)
AST SERPL-CCNC: 12 U/L (ref 1–40)
BASOPHILS # BLD AUTO: 0.05 10*3/MM3 (ref 0–0.2)
BASOPHILS NFR BLD AUTO: 0.4 % (ref 0–1.5)
BH CV GRAFT BRACHIAL PRESSURE LEFT: 124 MMHG
BH CV LEA LEFT ANT TIBIAL A DISTAL EDV: 0 CM/S
BH CV LEA LEFT ANT TIBIAL A DISTAL PSV: 61.7 CM/S
BH CV LEA LEFT ANT TIBIAL A MID EDV: 0.25 CM/S
BH CV LEA LEFT ANT TIBIAL A MID PSV: 49.7 CM/S
BH CV LEA LEFT ANT TIBIAL A PROX EDV: 0 CM/S
BH CV LEA LEFT ANT TIBIAL A PROX PSV: 55.5 CM/S
BH CV LEA LEFT CFA DISTAL EDV: 0 CM/S
BH CV LEA LEFT CFA DISTAL PSV: 122.59 CM/S
BH CV LEA LEFT DFA PROX EDV: 0 CM/S
BH CV LEA LEFT DFA PROX PSV: 88.85 CM/S
BH CV LEA LEFT DPA PRESSURE: 122 MMHG
BH CV LEA LEFT PERONEAL  MID EDV: 0 CM/S
BH CV LEA LEFT PERONEAL  MID PSV: 21.5 CM/S
BH CV LEA LEFT POPITEAL A  DISTAL EDV: 0.31 CM/S
BH CV LEA LEFT POPITEAL A  DISTAL PSV: 65.3 CM/S
BH CV LEA LEFT POPITEAL A  PROX EDV: 0 CM/S
BH CV LEA LEFT POPITEAL A  PROX PSV: 78.7 CM/S
BH CV LEA LEFT PTA DISTAL EDV: 0 CM/S
BH CV LEA LEFT PTA DISTAL PSV: 83 CM/S
BH CV LEA LEFT PTA MID EDV: 0 CM/S
BH CV LEA LEFT PTA MID PSV: 72.6 CM/S
BH CV LEA LEFT PTA PRESSURE: 122 MMHG
BH CV LEA LEFT PTA PROX EDV: 0 CM/S
BH CV LEA LEFT PTA PROX PSV: 55.1 CM/S
BH CV LEA LEFT SFA DISTAL EDV: 0.31 CM/S
BH CV LEA LEFT SFA DISTAL PSV: 65.3 CM/S
BH CV LEA LEFT SFA MID EDV: 0.31 CM/S
BH CV LEA LEFT SFA MID PSV: 65.3 CM/S
BH CV LEA LEFT SFA PROX EDV: 0 CM/S
BH CV LEA LEFT SFA PROX PSV: 105.7 CM/S
BH CV LEA LEFT TIBEOPERONEAL EDV: 0.25 CM/S
BH CV LEA LEFT TIBEOPERONEAL PSV: 62.25 CM/S
BH CV LEA RIGHT ANT TIBIAL A DISTAL EDV: 0 CM/S
BH CV LEA RIGHT ANT TIBIAL A DISTAL PSV: 73 CM/S
BH CV LEA RIGHT ANT TIBIAL A MID EDV: 0.19 CM/S
BH CV LEA RIGHT ANT TIBIAL A MID PSV: 67.3 CM/S
BH CV LEA RIGHT ANT TIBIAL A PROX EDV: 0 CM/S
BH CV LEA RIGHT ANT TIBIAL A PROX PSV: 64.1 CM/S
BH CV LEA RIGHT CFA DISTAL EDV: 0 CM/S
BH CV LEA RIGHT CFA DISTAL PSV: 134.03 CM/S
BH CV LEA RIGHT DFA PROX EDV: 0.43 CM/S
BH CV LEA RIGHT DFA PROX PSV: 98.33 CM/S
BH CV LEA RIGHT DPA PRESSURE: 114 MMHG
BH CV LEA RIGHT PERONEAL  MID EDV: 0.16 CM/S
BH CV LEA RIGHT PERONEAL  MID PSV: 35 CM/S
BH CV LEA RIGHT POPITEAL A  DISTAL EDV: 0.25 CM/S
BH CV LEA RIGHT POPITEAL A  DISTAL PSV: 54.7 CM/S
BH CV LEA RIGHT POPITEAL A  PROX EDV: 0 CM/S
BH CV LEA RIGHT POPITEAL A  PROX PSV: 73 CM/S
BH CV LEA RIGHT PTA DISTAL EDV: 0 CM/S
BH CV LEA RIGHT PTA DISTAL PSV: 26.4 CM/S
BH CV LEA RIGHT PTA MID EDV: 0 CM/S
BH CV LEA RIGHT PTA MID PSV: 44.8 CM/S
BH CV LEA RIGHT PTA PRESSURE: 118 MMHG
BH CV LEA RIGHT PTA PROX EDV: 0 CM/S
BH CV LEA RIGHT PTA PROX PSV: 51.1 CM/S
BH CV LEA RIGHT SFA DISTAL EDV: 0.25 CM/S
BH CV LEA RIGHT SFA DISTAL PSV: 62.2 CM/S
BH CV LEA RIGHT SFA MID EDV: 0.35 CM/S
BH CV LEA RIGHT SFA MID PSV: 82.2 CM/S
BH CV LEA RIGHT SFA PROX EDV: 0 CM/S
BH CV LEA RIGHT SFA PROX PSV: 121.1 CM/S
BH CV LEA RIGHT TIBEOPERONEAL EDV: 0.16 CM/S
BH CV LEA RIGHT TIBEOPERONEAL PSV: 34.99 CM/S
BH CV LOWER ARTERIAL LEFT ABI RATIO: 0.98
BH CV LOWER ARTERIAL RIGHT ABI RATIO: 0.95
BILIRUB SERPL-MCNC: 0.6 MG/DL (ref 0–1.2)
BUN BLDA-MCNC: 30 MG/DL (ref 8–26)
BUN SERPL-MCNC: 31 MG/DL (ref 8–23)
BUN/CREAT SERPL: 28.7 (ref 7–25)
CA-I BLDA-SCNC: 1.38 MMOL/L (ref 1.2–1.32)
CALCIUM SPEC-SCNC: 10.2 MG/DL (ref 8.6–10.5)
CHLORIDE BLDA-SCNC: 99 MMOL/L (ref 98–109)
CHLORIDE SERPL-SCNC: 96 MMOL/L (ref 98–107)
CHOLEST SERPL-MCNC: 121 MG/DL (ref 0–200)
CO2 BLDA-SCNC: 22 MMOL/L (ref 24–29)
CO2 SERPL-SCNC: 22 MMOL/L (ref 22–29)
CREAT BLDA-MCNC: 1.1 MG/DL (ref 0.6–1.3)
CREAT SERPL-MCNC: 1.08 MG/DL (ref 0.76–1.27)
DEPRECATED RDW RBC AUTO: 42.5 FL (ref 37–54)
EGFRCR SERPLBLD CKD-EPI 2021: 75.4 ML/MIN/1.73
EGFRCR SERPLBLD CKD-EPI 2021: 77.1 ML/MIN/1.73
EOSINOPHIL # BLD AUTO: 0.05 10*3/MM3 (ref 0–0.4)
EOSINOPHIL NFR BLD AUTO: 0.4 % (ref 0.3–6.2)
ERYTHROCYTE [DISTWIDTH] IN BLOOD BY AUTOMATED COUNT: 12.9 % (ref 12.3–15.4)
GLOBULIN UR ELPH-MCNC: 2.6 GM/DL
GLUCOSE BLDC GLUCOMTR-MCNC: 103 MG/DL (ref 70–130)
GLUCOSE SERPL-MCNC: 104 MG/DL (ref 65–99)
HBA1C MFR BLD: 5.7 % (ref 4.8–5.6)
HCT VFR BLD AUTO: 40.4 % (ref 37.5–51)
HCT VFR BLDA CALC: 39 % (ref 38–51)
HDLC SERPL-MCNC: 52 MG/DL (ref 40–60)
HGB BLD-MCNC: 13.3 G/DL (ref 13–17.7)
HGB BLDA-MCNC: 13.3 G/DL (ref 12–17)
IMM GRANULOCYTES # BLD AUTO: 0.17 10*3/MM3 (ref 0–0.05)
IMM GRANULOCYTES NFR BLD AUTO: 1.2 % (ref 0–0.5)
LDLC SERPL CALC-MCNC: 45 MG/DL (ref 0–100)
LDLC/HDLC SERPL: 0.78 {RATIO}
LYMPHOCYTES # BLD AUTO: 2.17 10*3/MM3 (ref 0.7–3.1)
LYMPHOCYTES NFR BLD AUTO: 15.2 % (ref 19.6–45.3)
MCH RBC QN AUTO: 29.6 PG (ref 26.6–33)
MCHC RBC AUTO-ENTMCNC: 32.9 G/DL (ref 31.5–35.7)
MCV RBC AUTO: 90 FL (ref 79–97)
MONOCYTES # BLD AUTO: 1.48 10*3/MM3 (ref 0.1–0.9)
MONOCYTES NFR BLD AUTO: 10.4 % (ref 5–12)
NEUTROPHILS NFR BLD AUTO: 10.33 10*3/MM3 (ref 1.7–7)
NEUTROPHILS NFR BLD AUTO: 72.4 % (ref 42.7–76)
NRBC BLD AUTO-RTO: 0 /100 WBC (ref 0–0.2)
PLATELET # BLD AUTO: 303 10*3/MM3 (ref 140–450)
PMV BLD AUTO: 9.4 FL (ref 6–12)
POTASSIUM BLDA-SCNC: 4.2 MMOL/L (ref 3.5–4.9)
POTASSIUM SERPL-SCNC: 4.4 MMOL/L (ref 3.5–5.2)
PROT SERPL-MCNC: 6.9 G/DL (ref 6–8.5)
RBC # BLD AUTO: 4.49 10*6/MM3 (ref 4.14–5.8)
SODIUM BLD-SCNC: 132 MMOL/L (ref 138–146)
SODIUM SERPL-SCNC: 131 MMOL/L (ref 136–145)
TRIGL SERPL-MCNC: 143 MG/DL (ref 0–150)
VLDLC SERPL-MCNC: 24 MG/DL (ref 5–40)
WBC NRBC COR # BLD: 14.25 10*3/MM3 (ref 3.4–10.8)

## 2023-07-20 PROCEDURE — 80047 BASIC METABLC PNL IONIZED CA: CPT

## 2023-07-20 PROCEDURE — C1887 CATHETER, GUIDING: HCPCS | Performed by: INTERNAL MEDICINE

## 2023-07-20 PROCEDURE — 85014 HEMATOCRIT: CPT

## 2023-07-20 PROCEDURE — C1769 GUIDE WIRE: HCPCS | Performed by: INTERNAL MEDICINE

## 2023-07-20 PROCEDURE — 76937 US GUIDE VASCULAR ACCESS: CPT | Performed by: INTERNAL MEDICINE

## 2023-07-20 PROCEDURE — 85347 COAGULATION TIME ACTIVATED: CPT

## 2023-07-20 PROCEDURE — C1760 CLOSURE DEV, VASC: HCPCS | Performed by: INTERNAL MEDICINE

## 2023-07-20 PROCEDURE — C1725 CATH, TRANSLUMIN NON-LASER: HCPCS | Performed by: INTERNAL MEDICINE

## 2023-07-20 PROCEDURE — 80061 LIPID PANEL: CPT

## 2023-07-20 PROCEDURE — C1894 INTRO/SHEATH, NON-LASER: HCPCS | Performed by: INTERNAL MEDICINE

## 2023-07-20 PROCEDURE — 80053 COMPREHEN METABOLIC PANEL: CPT

## 2023-07-20 PROCEDURE — 85025 COMPLETE CBC W/AUTO DIFF WBC: CPT

## 2023-07-20 PROCEDURE — 37236 OPEN/PERQ PLACE STENT 1ST: CPT | Performed by: INTERNAL MEDICINE

## 2023-07-20 PROCEDURE — 93925 LOWER EXTREMITY STUDY: CPT | Performed by: INTERNAL MEDICINE

## 2023-07-20 PROCEDURE — 93925 LOWER EXTREMITY STUDY: CPT

## 2023-07-20 PROCEDURE — 25010000002 FENTANYL CITRATE (PF) 50 MCG/ML SOLUTION: Performed by: INTERNAL MEDICINE

## 2023-07-20 PROCEDURE — 83036 HEMOGLOBIN GLYCOSYLATED A1C: CPT

## 2023-07-20 PROCEDURE — C1876 STENT, NON-COA/NON-COV W/DEL: HCPCS | Performed by: INTERNAL MEDICINE

## 2023-07-20 PROCEDURE — 25010000002 HEPARIN (PORCINE) PER 1000 UNITS: Performed by: INTERNAL MEDICINE

## 2023-07-20 PROCEDURE — 25510000001 IOPAMIDOL PER 1 ML: Performed by: INTERNAL MEDICINE

## 2023-07-20 PROCEDURE — 25010000002 MIDAZOLAM PER 1 MG: Performed by: INTERNAL MEDICINE

## 2023-07-20 DEVICE — STNT BIL VISIPRO .035 7F 27MM 135CM: Type: IMPLANTABLE DEVICE | Site: SUBCLAVIAN | Status: FUNCTIONAL

## 2023-07-20 RX ORDER — SODIUM CHLORIDE 9 MG/ML
40 INJECTION, SOLUTION INTRAVENOUS AS NEEDED
Status: DISCONTINUED | OUTPATIENT
Start: 2023-07-20 | End: 2023-07-20 | Stop reason: HOSPADM

## 2023-07-20 RX ORDER — ASPIRIN 81 MG/1
81 TABLET ORAL DAILY
Status: DISCONTINUED | OUTPATIENT
Start: 2023-07-21 | End: 2023-07-20 | Stop reason: HOSPADM

## 2023-07-20 RX ORDER — SODIUM CHLORIDE 0.9 % (FLUSH) 0.9 %
10 SYRINGE (ML) INJECTION EVERY 12 HOURS SCHEDULED
Status: DISCONTINUED | OUTPATIENT
Start: 2023-07-20 | End: 2023-07-20 | Stop reason: HOSPADM

## 2023-07-20 RX ORDER — LIDOCAINE HYDROCHLORIDE 10 MG/ML
INJECTION, SOLUTION EPIDURAL; INFILTRATION; INTRACAUDAL; PERINEURAL
Status: DISCONTINUED | OUTPATIENT
Start: 2023-07-20 | End: 2023-07-20 | Stop reason: HOSPADM

## 2023-07-20 RX ORDER — NITROGLYCERIN 0.4 MG/1
0.4 TABLET SUBLINGUAL
Status: DISCONTINUED | OUTPATIENT
Start: 2023-07-20 | End: 2023-07-20 | Stop reason: HOSPADM

## 2023-07-20 RX ORDER — MIDAZOLAM HYDROCHLORIDE 1 MG/ML
INJECTION INTRAMUSCULAR; INTRAVENOUS
Status: DISCONTINUED | OUTPATIENT
Start: 2023-07-20 | End: 2023-07-20 | Stop reason: HOSPADM

## 2023-07-20 RX ORDER — SODIUM CHLORIDE 0.9 % (FLUSH) 0.9 %
10 SYRINGE (ML) INJECTION AS NEEDED
Status: DISCONTINUED | OUTPATIENT
Start: 2023-07-20 | End: 2023-07-20 | Stop reason: HOSPADM

## 2023-07-20 RX ORDER — HEPARIN SODIUM 1000 [USP'U]/ML
INJECTION, SOLUTION INTRAVENOUS; SUBCUTANEOUS
Status: DISCONTINUED | OUTPATIENT
Start: 2023-07-20 | End: 2023-07-20 | Stop reason: HOSPADM

## 2023-07-20 RX ORDER — ASPIRIN 81 MG/1
324 TABLET, CHEWABLE ORAL ONCE
Status: COMPLETED | OUTPATIENT
Start: 2023-07-20 | End: 2023-07-20

## 2023-07-20 RX ORDER — ACETAMINOPHEN 325 MG/1
650 TABLET ORAL EVERY 4 HOURS PRN
Status: DISCONTINUED | OUTPATIENT
Start: 2023-07-20 | End: 2023-07-20 | Stop reason: HOSPADM

## 2023-07-20 RX ORDER — ACETAMINOPHEN 325 MG/1
325 TABLET ORAL EVERY 4 HOURS PRN
Status: DISCONTINUED | OUTPATIENT
Start: 2023-07-20 | End: 2023-07-20 | Stop reason: HOSPADM

## 2023-07-20 RX ORDER — MAGNESIUM OXIDE/MAG AA CHELATE 300 MG
300 CAPSULE ORAL DAILY
COMMUNITY

## 2023-07-20 RX ORDER — SODIUM CHLORIDE 9 MG/ML
3 INJECTION, SOLUTION INTRAVENOUS CONTINUOUS
Status: ACTIVE | OUTPATIENT
Start: 2023-07-20 | End: 2023-07-20

## 2023-07-20 RX ORDER — ROSUVASTATIN CALCIUM 20 MG/1
20 TABLET, COATED ORAL NIGHTLY
Qty: 90 TABLET | Refills: 3 | Status: SHIPPED | OUTPATIENT
Start: 2023-07-20

## 2023-07-20 RX ORDER — FENTANYL CITRATE 50 UG/ML
INJECTION, SOLUTION INTRAMUSCULAR; INTRAVENOUS
Status: DISCONTINUED | OUTPATIENT
Start: 2023-07-20 | End: 2023-07-20 | Stop reason: HOSPADM

## 2023-07-20 RX ORDER — CLOPIDOGREL BISULFATE 75 MG/1
TABLET ORAL
Status: DISCONTINUED | OUTPATIENT
Start: 2023-07-20 | End: 2023-07-20 | Stop reason: HOSPADM

## 2023-07-20 RX ADMIN — ASPIRIN 324 MG: 81 TABLET, CHEWABLE ORAL at 07:20

## 2023-07-20 RX ADMIN — SODIUM CHLORIDE 3 ML/KG/HR: 9 INJECTION, SOLUTION INTRAVENOUS at 07:20

## 2023-07-20 NOTE — Clinical Note
A 7 fr sheath was  inserted using micropuncture technique with ultrasound guidance into the right femoral artery. Sheath insertion not delayed.

## 2023-07-20 NOTE — Clinical Note
The lesion was dilated with multiple inflations. Multiple inflations 8 sunitha for 30 sec, then 10 sunitha for 30 sec.

## 2023-07-20 NOTE — H&P
[facility] Cardiology   [address], [address]  [place], [place], [address]    [phone]  WW[facility]           HISTORY AND PHYSICAL NOTE    Patient Care Team:  Patient Care Team:  Zaid Pena PA as PCP - General (Physician Assistant)  Khalif Serna MD as Consulting Physician (Gastroenterology)  Brandon Guo MD as Consulting Physician (Cardiology)      Chief complaint: No chief complaint on file.           Subjective:     Cardiac focused problem list:  Hypertension  Hyperlipidemia  PAD  Lower extremity arterial duplex, 5/15/2023: Right ENEIDA is normal, normal digital pressures.  Left ENEIDA is moderately reduced, waveforms consistent with aortic iliac disease, abnormal digital pressures.  Brachial blood pressure gradient is greater than 15 mmHg, suggestive of left subclavian artery disease.  Left upper extremity arterial duplex, 5/15/2023: High-grade stenosis of the left proximal left subclavian artery noted with retrograde left vertebral flow.  Rest of the left upper extremity arteries are free of significant disease.  Peripheral angiogram 6/20/2023:  90% heavily calcified left common iliac artery status post lithotripsy and stenting. 70% calcified small left subclavian artery stenosis  Left subclavian artery stenosis  Osteoarthritis  Prior left shoulder injection  Left hip bursitis   status post injections.  Historically has helped.  Repeat injection 01/2023 did not help  GERD  Gout  Former nicotine dependence  Smoked for 30 years, quit in 2020    HPI:      Alex Parr is a 63 y.o. male.  Patient presents today for peripheral angiogram and left subclavian artery intervention.  Last month he underwent peripheral angiogram with left common iliac artery stenting.  Notes significant improvement in his leg pain.  Continues to have left shoulder and arm pain.  Also notes significant joint pain since starting increased dose of statin.  Denies chest pain, shortness of  breath or palpitations..    Review of Systems:  As noted in the HPI    PFSH:  Patient Active Problem List   Diagnosis    Essential hypertension    Hyperlipidemia    Gout    Personal history of nicotine dependence    History of colonic polyps    Diverticulosis of large intestine    Internal hemorrhoids    Vitamin D deficiency    Impaired glucose tolerance    Gastroesophageal reflux disease    Bilateral carpal tunnel syndrome    PAD (peripheral artery disease)    Subclavian artery stenosis, left       No current facility-administered medications on file prior to encounter.     Current Outpatient Medications on File Prior to Encounter   Medication Sig Dispense Refill    aspirin 81 MG EC tablet Take 1 tablet by mouth Daily. 90 tablet 3    cholecalciferol (VITAMIN D3) 25 MCG (1000 UT) tablet Take 1 tablet by mouth Daily.      clopidogrel (PLAVIX) 75 MG tablet Take 1 tablet by mouth Daily. 90 tablet 3    famotidine (PEPCID) 20 MG tablet Take 1 tablet by mouth Every Night. (Patient taking differently: Take 1 tablet by mouth Daily.) 90 tablet 3    lisinopril-hydrochlorothiazide (PRINZIDE,ZESTORETIC) 20-12.5 MG per tablet Take 1 tablet by mouth Daily. 90 tablet 2    Magnesium 300 MG capsule Take 300 mg by mouth Daily.      rosuvastatin (CRESTOR) 40 MG tablet Take 1 tablet by mouth Every Night. 90 tablet 3    vitamin B-12 (CYANOCOBALAMIN) 1000 MCG tablet Take 1 tablet by mouth Daily.         Social History     Socioeconomic History    Marital status:    Tobacco Use    Smoking status: Former     Packs/day: 0.50     Years: 35.00     Pack years: 17.50     Types: Cigarettes     Start date: 1980     Quit date: 1/22/2020     Years since quitting: 3.4    Smokeless tobacco: Never   Vaping Use    Vaping Use: Never used   Substance and Sexual Activity    Alcohol use: Yes     Alcohol/week: 14.0 standard drinks     Types: 14 Glasses of wine per week     Comment: 2 glasses of wine/night    Drug use: No    Sexual activity: Defer             Objective:     Vital Sign Min/Max for last 24 hours  Temp  Min: 97.7 °F (36.5 °C)  Max: 97.7 °F (36.5 °C)   BP  Min: 141/82  Max: 141/82   Pulse  Min: 85  Max: 85   Resp  Min: 16  Max: 16   SpO2  Min: 96 %  Max: 96 %   No data recorded    No intake or output data in the 24 hours ending 07/20/23 0737        Vitals:    07/20/23 0630   BP: 141/82   Pulse: 85   Resp: 16   Temp: 97.7 °F (36.5 °C)   SpO2: 96%     CONSTITUTIONAL: No acute distress  RESPIRATORY: Normal effort. Clear to auscultation bilaterally without wheezing or rales  CARDIOVASCULAR: Regular rate and rhythm with normal S1 and S2. Without murmur.  PERIPHERAL VASCULAR: No carotid bruit bilaterally.  Faint left radial pulse. 30mmHg lower BP in left arm on prior office visit.  Right femoral pulse 2+.  There is no lower extremity edema bilaterally.    7/2023 exam: Left PT and DP pulses 2+.    Labs and radiologic results:  Today's results were reviewed by myself.    Cardiac Data:    EKG 6/12/2023: NSR    Results for orders placed during the hospital encounter of 06/07/23    Adult Transthoracic Echo Complete W/ Cont if Necessary Per Protocol    Interpretation Summary    Left ventricular systolic function is normal. Left ventricular ejection fraction appears to be 61 - 65%.    The right ventricular cavity is borderline dilated.    The right atrial cavity is borderline dilated.      Tele:  NSR         Assessment and Plan:     Problem list:    Subclavian artery stenosis, left      ASSESSMENT:  PAD  Status post left common iliac stent, 6/20/2023  70% left subclavian artery stenosis noted on peripheral angiogram.  Hypertension  Hyperlipidemia   Patient reporting significant myalgias with increased dose of rosuvastatin    PLAN:  Peripheral angiogram with left subclavian artery intervention via right femoral access with Dr. Guo.  Right femoral pulse 2+.  Labs reviewed  The risks, benefits, and alternatives of the procedure have been reviewed and the patient  wishes to proceed.   Further recommendations to follow procedure.    Electronically signed by JAGDISH Ngo, 07/20/23, 7:37 AM EDT.

## 2023-07-20 NOTE — Clinical Note
The right DP pulse is +2. The right PT pulse is +2. The left radial pulse is +2. The right femoral pulse is +2.

## 2023-07-20 NOTE — Clinical Note
The DP pulses are +2 bilaterally. The PT pulses are +2 bilaterally. The left radial pulse is +2. The femoral pulses are +2 bilaterally.

## 2023-09-22 NOTE — PROGRESS NOTES
[facility] Cardiology   [address], [address]  [place], [place], [address] [phone]  WWW.[facility]           OUTPATIENT CLINIC FOLLOW UP NOTE    Patient Care Team:  Patient Care Team:  Zaid Pena PA as PCP - General (Physician Assistant)  Khalif Serna MD as Consulting Physician (Gastroenterology)  Brandon Guo MD as Consulting Physician (Cardiology)    Subjective:      Chief Complaint   Patient presents with    PAD (peripheral artery disease)         Alex Parr is a 63 y.o. male.  Cardiac focused, problem list:  Hypertension  Hyperlipidemia  PAD  Lower extremity arterial duplex, 5/15/2023: Right ENEIDA is normal, normal digital pressures.  Left ENEIDA is moderately reduced, waveforms consistent with aortic iliac disease, abnormal digital pressures.  Brachial blood pressure gradient is greater than 15 mmHg, suggestive of left subclavian artery disease.  Left upper extremity arterial duplex, 5/15/2023: High-grade stenosis of the left proximal left subclavian artery noted with retrograde left vertebral flow.  Rest of the left upper extremity arteries are free of significant disease.  Peripheral angiogram 6/20/2023:  90% heavily calcified left common iliac artery status post lithotripsy and stenting. 70% calcified small left subclavian artery stenosis  Peripheral angiogram, 7/20/2023:  80% left subclavian artery stenosis status post scoring balloon angioplasty and stenting.   Left subclavian artery stenosis  Osteoarthritis  Prior left shoulder injection  Left hip bursitis   status post injections.  Historically has helped.  Repeat injection 01/2023 did not help  GERD  Gout  Former nicotine dependence  Smoked for 30 years, quit in 2020    HPI:    Today the patient is for follow-up    Continues to feel the benefit of his PAD intervention.  Denies new lower extremity symptoms.  No left arm claudication either.  Tolerating Plavix, occasional ecchymosis    Review of  "Systems:  As noted above in the HPI     PFSH:  Patient Active Problem List   Diagnosis    Essential hypertension    Hyperlipidemia    Gout    Personal history of nicotine dependence    History of colonic polyps    Diverticulosis of large intestine    Internal hemorrhoids    Vitamin D deficiency    Impaired glucose tolerance    Gastroesophageal reflux disease    Bilateral carpal tunnel syndrome    PAD (peripheral artery disease)    Subclavian artery stenosis, left    Overweight (BMI 25.0-29.9)         Current Outpatient Medications:     aspirin 81 MG EC tablet, Take 1 tablet by mouth Daily., Disp: 90 tablet, Rfl: 3    cholecalciferol (VITAMIN D3) 25 MCG (1000 UT) tablet, Take 1 tablet by mouth Daily., Disp: , Rfl:     clopidogrel (PLAVIX) 75 MG tablet, Take 1 tablet by mouth Daily., Disp: 90 tablet, Rfl: 3    famotidine (PEPCID) 20 MG tablet, Take 1 tablet by mouth Every Night. (Patient taking differently: Take 1 tablet by mouth Daily.), Disp: 90 tablet, Rfl: 3    lisinopril-hydrochlorothiazide (PRINZIDE,ZESTORETIC) 20-12.5 MG per tablet, Take 1 tablet by mouth Daily., Disp: 90 tablet, Rfl: 2    Magnesium 300 MG capsule, Take 300 mg by mouth Daily., Disp: , Rfl:     rosuvastatin (CRESTOR) 20 MG tablet, Take 1 tablet by mouth Every Night., Disp: 90 tablet, Rfl: 3    vitamin B-12 (CYANOCOBALAMIN) 1000 MCG tablet, Take 1 tablet by mouth Daily., Disp: , Rfl:      reports that he quit smoking about 3 years ago. His smoking use included cigarettes. He started smoking about 43 years ago. He has a 17.50 pack-year smoking history. He has never used smokeless tobacco.      Objective:   Physical exam:  /70 (BP Location: Right arm, Patient Position: Sitting)   Pulse 95   Ht 185.4 cm (73\")   Wt 90.2 kg (198 lb 12.8 oz)   SpO2 97%   BMI 26.23 kg/m²   CONSTITUTIONAL: No acute distress  CARDIOVASCULAR: Regular rate and rhythm with normal S1 and S2. Without murmur.  PERIPHERAL VASCULAR: No carotid bruit bilaterally.  2+ " left radial pulse    7/20/2023 exam: Left DP 2+    Labs:    Lab Results   Component Value Date    LDL 45 07/20/2023     No components found for: LDLDIRECTC    Diagnostic Data:    Procedures    Results for orders placed during the hospital encounter of 06/07/23    Adult Transthoracic Echo Complete W/ Cont if Necessary Per Protocol    Interpretation Summary    Left ventricular systolic function is normal. Left ventricular ejection fraction appears to be 61 - 65%.    The right ventricular cavity is borderline dilated.    The right atrial cavity is borderline dilated.      Assessment and Plan:     Left subclavian artery stenosis  Left common iliac artery stenosis  Essential hypertension  Mixed hyperlipidemia   -Continue aspirin, clopidogrel, rosuvastatin  -Joint pain with rosuvastatin at 40 mg nightly.  Continue trial of 20 mg nightly.  Repeat FLP with PCP upcoming  -If with persistent statin related side effects, we will discuss the option of switching to Repatha  -Plan for discontinuation of clopidogrel in January 2024, continue aspirin  -Recommend exercise, risk factor modification    - Return in about 6 months (around 3/25/2024) for Next scheduled follow-up with an ECG.

## 2023-09-25 ENCOUNTER — OFFICE VISIT (OUTPATIENT)
Dept: CARDIOLOGY | Facility: CLINIC | Age: 63
End: 2023-09-25

## 2023-09-25 ENCOUNTER — OFFICE VISIT (OUTPATIENT)
Dept: FAMILY MEDICINE CLINIC | Facility: CLINIC | Age: 63
End: 2023-09-25

## 2023-09-25 VITALS
DIASTOLIC BLOOD PRESSURE: 70 MMHG | OXYGEN SATURATION: 97 % | SYSTOLIC BLOOD PRESSURE: 118 MMHG | HEART RATE: 95 BPM | HEIGHT: 73 IN | WEIGHT: 198.8 LBS | BODY MASS INDEX: 26.35 KG/M2

## 2023-09-25 VITALS
HEIGHT: 73 IN | SYSTOLIC BLOOD PRESSURE: 128 MMHG | WEIGHT: 199.4 LBS | DIASTOLIC BLOOD PRESSURE: 86 MMHG | BODY MASS INDEX: 26.43 KG/M2 | OXYGEN SATURATION: 98 % | HEART RATE: 95 BPM

## 2023-09-25 DIAGNOSIS — E66.3 OVERWEIGHT (BMI 25.0-29.9): ICD-10-CM

## 2023-09-25 DIAGNOSIS — I77.1 SUBCLAVIAN ARTERY STENOSIS, LEFT: ICD-10-CM

## 2023-09-25 DIAGNOSIS — I73.9 PAD (PERIPHERAL ARTERY DISEASE): ICD-10-CM

## 2023-09-25 DIAGNOSIS — I10 ESSENTIAL HYPERTENSION: ICD-10-CM

## 2023-09-25 DIAGNOSIS — E78.2 MIXED HYPERLIPIDEMIA: ICD-10-CM

## 2023-09-25 DIAGNOSIS — E78.5 HYPERLIPIDEMIA, UNSPECIFIED HYPERLIPIDEMIA TYPE: ICD-10-CM

## 2023-09-25 DIAGNOSIS — I73.9 PAD (PERIPHERAL ARTERY DISEASE): Primary | ICD-10-CM

## 2023-09-25 DIAGNOSIS — I10 ESSENTIAL HYPERTENSION: Primary | ICD-10-CM

## 2023-09-25 PROCEDURE — 99214 OFFICE O/P EST MOD 30 MIN: CPT | Performed by: INTERNAL MEDICINE

## 2023-09-25 PROCEDURE — 99214 OFFICE O/P EST MOD 30 MIN: CPT | Performed by: PHYSICIAN ASSISTANT

## 2023-09-25 NOTE — PROGRESS NOTES
Chief Complaint   Patient presents with    Hypertension     5 month f/u        HPI     Alex Parr is a 63 y.o. male with a PMH of PAD s/p left common iliac stent in June, subclavian artery stenosis s/p intervention in July, HTN, HLD, prediabetes, and former tobacco use who is here for 5-month follow-up of hypertension.     Compliant on aspirin and clopidogrel.   His left leg pain was immediately better after his left common iliac stent. He has a follow-up appointment with his cardiologist later today.   His whole body ached after rosuvastatin was increased to 40 mg. He is back on 20 mg currently and is taking this every other day for the past 2 months. This dose has resolved his myalgias. He did have cramps when he was taking it every day.   He has not monitored his blood pressure at home recently but is compliant on lisinopril-hydrochlorothiazide.     Past Medical History:   Diagnosis Date    Gout     Hyperlipidemia     Hypertension        Past Surgical History:   Procedure Laterality Date    CARDIAC CATHETERIZATION N/A 6/20/2023    Procedure: Peripheral angiography;  Surgeon: Brandon Guo MD;  Location:  [facility];  Service: Cardiovascular;  Laterality: N/A;    CARDIAC CATHETERIZATION N/A 7/20/2023    Procedure: Angioplasty-peripheral;  Surgeon: Brandon Guo MD;  Location:  [facility];  Service: Cardiovascular;  Laterality: N/A;  Left subclavian intervention. No industry rep needed. Schedule in ~2-4 weeks    INGUINAL HERNIA REPAIR  2001       Family History   Problem Relation Age of Onset    No Known Problems Mother     Stroke Father     Parkinsonism Father 80    Hypertension Father     ALS Maternal Grandmother     No Known Problems Maternal Grandfather     No Known Problems Paternal Grandmother     No Known Problems Paternal Grandfather        Social History     Socioeconomic History    Marital status:    Tobacco Use    Smoking status: Former     Packs/day:  0.50     Years: 35.00     Pack years: 17.50     Types: Cigarettes     Start date: 1980     Quit date: 1/22/2020     Years since quitting: 3.6    Smokeless tobacco: Never   Vaping Use    Vaping Use: Never used   Substance and Sexual Activity    Alcohol use: Yes     Alcohol/week: 14.0 standard drinks     Types: 14 Glasses of wine per week     Comment: 2 glasses of wine/night    Drug use: No    Sexual activity: Defer       No Known Allergies    ROS    Review of Systems   Eyes:  Negative for blurred vision.   Respiratory:  Negative for shortness of breath.    Cardiovascular:  Negative for chest pain.   Neurological:  Negative for dizziness and headache.     Vitals:    09/25/23 1051   BP: 128/86   Pulse: 95   SpO2: 98%     Body mass index is 26.31 kg/m².      Current Outpatient Medications:     aspirin 81 MG EC tablet, Take 1 tablet by mouth Daily., Disp: 90 tablet, Rfl: 3    cholecalciferol (VITAMIN D3) 25 MCG (1000 UT) tablet, Take 1 tablet by mouth Daily., Disp: , Rfl:     clopidogrel (PLAVIX) 75 MG tablet, Take 1 tablet by mouth Daily., Disp: 90 tablet, Rfl: 3    famotidine (PEPCID) 20 MG tablet, Take 1 tablet by mouth Every Night. (Patient taking differently: Take 1 tablet by mouth Daily.), Disp: 90 tablet, Rfl: 3    lisinopril-hydrochlorothiazide (PRINZIDE,ZESTORETIC) 20-12.5 MG per tablet, Take 1 tablet by mouth Daily., Disp: 90 tablet, Rfl: 2    Magnesium 300 MG capsule, Take 300 mg by mouth Daily., Disp: , Rfl:     rosuvastatin (CRESTOR) 20 MG tablet, Take 1 tablet by mouth Every Night., Disp: 90 tablet, Rfl: 3    vitamin B-12 (CYANOCOBALAMIN) 1000 MCG tablet, Take 1 tablet by mouth Daily., Disp: , Rfl:     PE    Physical Exam  Vitals reviewed.   Constitutional:       General: He is not in acute distress.     Appearance: He is well-developed.   HENT:      Head: Normocephalic and atraumatic.   Eyes:      Conjunctiva/sclera: Conjunctivae normal.   Cardiovascular:      Rate and Rhythm: Normal rate and regular  rhythm.      Pulses:           Radial pulses are 2+ on the right side and 2+ on the left side.        Dorsalis pedis pulses are 2+ on the right side and 2+ on the left side.        Posterior tibial pulses are 2+ on the right side and 2+ on the left side.      Heart sounds: Normal heart sounds. No murmur heard.  Pulmonary:      Effort: Pulmonary effort is normal.      Breath sounds: Normal breath sounds.   Musculoskeletal:      Cervical back: Normal range of motion.   Skin:     General: Skin is warm and dry.   Neurological:      Mental Status: He is alert.      Gait: Gait normal.   Psychiatric:         Speech: Speech normal.         Behavior: Behavior normal.       Results    Results for orders placed or performed during the hospital encounter of 07/20/23   Comprehensive Metabolic Panel    Specimen: Blood   Result Value Ref Range    Glucose 104 (H) 65 - 99 mg/dL    BUN 31 (H) 8 - 23 mg/dL    Creatinine 1.08 0.76 - 1.27 mg/dL    Sodium 131 (L) 136 - 145 mmol/L    Potassium 4.4 3.5 - 5.2 mmol/L    Chloride 96 (L) 98 - 107 mmol/L    CO2 22.0 22.0 - 29.0 mmol/L    Calcium 10.2 8.6 - 10.5 mg/dL    Total Protein 6.9 6.0 - 8.5 g/dL    Albumin 4.3 3.5 - 5.2 g/dL    ALT (SGPT) 6 1 - 41 U/L    AST (SGOT) 12 1 - 40 U/L    Alkaline Phosphatase 83 39 - 117 U/L    Total Bilirubin 0.6 0.0 - 1.2 mg/dL    Globulin 2.6 gm/dL    A/G Ratio 1.7 g/dL    BUN/Creatinine Ratio 28.7 (H) 7.0 - 25.0    Anion Gap 13.0 5.0 - 15.0 mmol/L    eGFR 77.1 >60.0 mL/min/1.73   Lipid Panel    Specimen: Blood   Result Value Ref Range    Total Cholesterol 121 0 - 200 mg/dL    Triglycerides 143 0 - 150 mg/dL    HDL Cholesterol 52 40 - 60 mg/dL    LDL Cholesterol  45 0 - 100 mg/dL    VLDL Cholesterol 24 5 - 40 mg/dL    LDL/HDL Ratio 0.78    Hemoglobin A1c    Specimen: Blood   Result Value Ref Range    Hemoglobin A1C 5.70 (H) 4.80 - 5.60 %   CBC Auto Differential    Specimen: Blood   Result Value Ref Range    WBC 14.25 (H) 3.40 - 10.80 10*3/mm3    RBC 4.49  4.14 - 5.80 10*6/mm3    Hemoglobin 13.3 13.0 - 17.7 g/dL    Hematocrit 40.4 37.5 - 51.0 %    MCV 90.0 79.0 - 97.0 fL    MCH 29.6 26.6 - 33.0 pg    MCHC 32.9 31.5 - 35.7 g/dL    RDW 12.9 12.3 - 15.4 %    RDW-SD 42.5 37.0 - 54.0 fl    MPV 9.4 6.0 - 12.0 fL    Platelets 303 140 - 450 10*3/mm3    Neutrophil % 72.4 42.7 - 76.0 %    Lymphocyte % 15.2 (L) 19.6 - 45.3 %    Monocyte % 10.4 5.0 - 12.0 %    Eosinophil % 0.4 0.3 - 6.2 %    Basophil % 0.4 0.0 - 1.5 %    Immature Grans % 1.2 (H) 0.0 - 0.5 %    Neutrophils, Absolute 10.33 (H) 1.70 - 7.00 10*3/mm3    Lymphocytes, Absolute 2.17 0.70 - 3.10 10*3/mm3    Monocytes, Absolute 1.48 (H) 0.10 - 0.90 10*3/mm3    Eosinophils, Absolute 0.05 0.00 - 0.40 10*3/mm3    Basophils, Absolute 0.05 0.00 - 0.20 10*3/mm3    Immature Grans, Absolute 0.17 (H) 0.00 - 0.05 10*3/mm3    nRBC 0.0 0.0 - 0.2 /100 WBC   POC CHEM 8    Specimen: Blood   Result Value Ref Range    Glucose 103 70 - 130 mg/dL    BUN 30 (H) 8 - 26 mg/dL    Creatinine 1.10 0.60 - 1.30 mg/dL    Sodium 132 (L) 138 - 146 mmol/L    POC Potassium 4.2 3.5 - 4.9 mmol/L    Chloride 99 98 - 109 mmol/L    Total CO2 22 (L) 24 - 29 mmol/L    Hemoglobin 13.3 12.0 - 17.0 g/dL    Hematocrit 39 38 - 51 %    Ionized Calcium 1.38 (H) 1.20 - 1.32 mmol/L    eGFR 75.4 >60.0 mL/min/1.73   POC Activated Clotting Time    Specimen: Blood   Result Value Ref Range    Activated Clotting Time  371 (H) 82 - 152 Seconds   POC Activated Clotting Time    Specimen: Blood   Result Value Ref Range    Activated Clotting Time  305 (H) 82 - 152 Seconds   Duplex Lower Extremity Art / Grafts - Bilateral CAR   Result Value Ref Range    SFA Prox PSV-Right 121.1 cm/s    SFA Prox EDV-Right 0.00 cm/s    SFA Mid PSV-Right 82.2 cm/s    SFA Mid EDV-Right 0.35 cm/s    SFA Distal PSV-Right 62.2 cm/s    SFA Distal EDV-Right 0.25 cm/s    Popiteal A Prox PSV-Right 73.0 cm/s    Popiteal A Prox EDV-Right 0.00 cm/s    Popiteal A Distal PSV-Right 54.7 cm/s    Popiteal A Distal  EDV-Right 0.25 cm/s    Ant Tibial A Prox PSV-Right 64.1 cm/s    Ant Tibial A Prox EDV-Right 0.00 cm/s    Ant Tibial A Mid PSV-Right 67.3 cm/s    Ant Tibial A Mid EDV-Right 0.19 cm/s    Ant Tibial A Distal PSV-Right 73.0 cm/s    Ant Tibial A Distal EDV-Right 0.00 cm/s    PTA Prox PSV-Right 51.1 cm/s    PTA Prox EDV-Right 0.00 cm/s    PTA Mid PSV-Right 44.8 cm/s    PTA Mid EDV-Right 0.00 cm/s    PTA Distal PSV-Right 26.4 cm/s    PTA Distal EDV-Right 0.00 cm/s    Peroneal Mid PSV-Right 35.0 cm/s    Peroneal Mid EDV-Right 0.16 cm/s    SFA Prox PSV-Left 105.7 cm/s    SFA Prox EDV-Left 0.00 cm/s    SFA Mid PSV-Left 65.3 cm/s    SFA Mid EDV-Left 0.31 cm/s    SFA Distal PSV-Left 65.3 cm/s    SFA Distal EDV-Left 0.31 cm/s    Popiteal A Prox PSV-Left 78.7 cm/s    Popiteal A Prox EDV-Left 0.00 cm/s    Popiteal A Distal PSV-Left 65.3 cm/s    Popiteal A Distal EDV-Left 0.31 cm/s    Ant Tibial A Prox PSV-Left 55.5 cm/s    Ant Tibial A Prox EDV-Left 0.00 cm/s    Ant Tibial A Mid PSV-Left 49.7 cm/s    Ant Tibial A Mid EDV-Left 0.25 cm/s    Ant Tibial A Distal PSV-Left 61.7 cm/s    Ant Tibial A Distal EDV-Left 0.00 cm/s    PTA Prox PSV-Left 55.1 cm/s    PTA Prox EDV-Left 0.00 cm/s    PTA Mid PSV-Left 72.6 cm/s    PTA Mid EDV-Left 0.00 cm/s    PTA Distal PSV-Left 83.0 cm/s    PTA Distal EDV-Left 0.00 cm/s    Peroneal Mid PSV-Left 21.5 cm/s    Peroneal Mid EDV-Left 0.00 cm/s    CFA Distal PSV-Right 134.03 cm/s    CFA Distal EDV-Right 0.00 cm/s    DFA Prox PSV-Right 98.33 cm/s    DFA Prox EDV-Right 0.43 cm/s    Rt. Tibeoperoneal PSV 34.99 cm/s    Rt Tibeoperoneal EDV 0.16 cm/s    Left Brachial Pressure 124 mmHg    CFA Distal PSV-Left 122.59 cm/s    CFA Distal EDV-Left 0.00 cm/s    DFA Prox PSV-Left 88.85 cm/s    DFA Prox EDV-Left 0.00 cm/s    Lt. Tibperoneal PSV 62.25 cm/s    Lt. Tibeoperoneal EDV 0.25 cm/s    RIGHT PTA PRESSURE 118 mmHg    LEFT PTA PRESSURE 122 mmHg    RIGHT DPA PRESSURE 114 mmHg    LEFT DPA PRESSURE 122 mmHg    RIGHT  ENEIDA RATIO 0.95     LEFT ENEIDA RATIO 0.98        A/P    Problem List Items Addressed This Visit          Cardiac and Vasculature    Subclavian artery stenosis, left    Overview     5/2023 duplex: High-grade stenosis of the proximal left subclavian artery with retrograde left vertebral flow.           PAD (peripheral artery disease)    Overview     5/2023 BLE duplex: Right ENEIDA normal. The left ENEIDA is moderately reduced. Waveforms are consistent with aorto-iliac disease. Abnormal  digital pressures.  6/20/2023: 90% heavily calcified left common iliac artery stenosis status post lithotripsy and stenting with a Viabahn VBX 8 x 39 mm covered stent. 70% calcified small left subclavian artery stenosis           Current Assessment & Plan     Counseled patient to continue DAPT, statin.   Follow-up with cardiology as scheduled.          Hyperlipidemia    Current Assessment & Plan     LDL was 45 in July on rosuvastatin 40 mg. Dosage has since been reduced due to myalgias.   Repeat lipids. Could consider zetia, Nexlizet, or PCSK9 inhibitor depending on results.   Discussed lifestyle improvements.          Relevant Orders    Lipid Panel    Essential hypertension - Primary    Current Assessment & Plan     /82 L arm; 124/86 R arm.   Continue losartan-hydrochlorothiazide.   RTC in 6 months for annual physical, sooner prn.             Endocrine and Metabolic    Overweight (BMI 25.0-29.9)    Current Assessment & Plan     Patient's (Body mass index is 26.31 kg/m².) indicates that they are overweight with health conditions that include hypertension, dyslipidemias, GERD, and peripheral vascular disease . Weight is unchanged. BMI is is above average; BMI management plan is completed. We discussed low calorie, low carb based diet program, portion control, and increasing exercise.   Goal 10-15 pound weight loss reviewed.             Plan of care was reviewed with patient at the conclusion of today's visit. Education was provided  regarding diagnoses, management, and the importance of keeping follow-up appointments. The patient was counseled regarding the risks, benefits, and possible side-effects of treatment. Patient and/or family express understanding and agreement with the management plan.        ABDIEL Ratliff

## 2023-09-25 NOTE — ASSESSMENT & PLAN NOTE
/82 L arm; 124/86 R arm.   Continue losartan-hydrochlorothiazide.   RTC in 6 months for annual physical, sooner prn.

## 2023-09-25 NOTE — ASSESSMENT & PLAN NOTE
Patient's (Body mass index is 26.31 kg/m².) indicates that they are overweight with health conditions that include hypertension, dyslipidemias, GERD, and peripheral vascular disease . Weight is unchanged. BMI is is above average; BMI management plan is completed. We discussed low calorie, low carb based diet program, portion control, and increasing exercise.   Goal 10-15 pound weight loss reviewed.

## 2023-09-25 NOTE — ASSESSMENT & PLAN NOTE
LDL was 45 in July on rosuvastatin 40 mg. Dosage has since been reduced due to myalgias.   Repeat lipids. Could consider zetia, Nexlizet, or PCSK9 inhibitor depending on results.   Discussed lifestyle improvements.

## 2023-11-28 ENCOUNTER — OFFICE VISIT (OUTPATIENT)
Dept: ORTHOPEDIC SURGERY | Facility: CLINIC | Age: 63
End: 2023-11-28
Payer: COMMERCIAL

## 2023-11-28 VITALS
WEIGHT: 196 LBS | SYSTOLIC BLOOD PRESSURE: 126 MMHG | DIASTOLIC BLOOD PRESSURE: 86 MMHG | HEIGHT: 73 IN | BODY MASS INDEX: 25.98 KG/M2

## 2023-11-28 DIAGNOSIS — M17.0 PRIMARY OSTEOARTHRITIS OF BOTH KNEES: Primary | ICD-10-CM

## 2023-11-28 PROCEDURE — 20610 DRAIN/INJ JOINT/BURSA W/O US: CPT | Performed by: ORTHOPAEDIC SURGERY

## 2023-11-28 RX ORDER — TRIAMCINOLONE ACETONIDE 40 MG/ML
40 INJECTION, SUSPENSION INTRA-ARTICULAR; INTRAMUSCULAR
Status: COMPLETED | OUTPATIENT
Start: 2023-11-28 | End: 2023-11-28

## 2023-11-28 RX ORDER — LIDOCAINE HYDROCHLORIDE 10 MG/ML
3 INJECTION, SOLUTION EPIDURAL; INFILTRATION; INTRACAUDAL; PERINEURAL
Status: COMPLETED | OUTPATIENT
Start: 2023-11-28 | End: 2023-11-28

## 2023-11-28 RX ORDER — LIDOCAINE HYDROCHLORIDE 10 MG/ML
33 INJECTION, SOLUTION EPIDURAL; INFILTRATION; INTRACAUDAL; PERINEURAL
Status: COMPLETED | OUTPATIENT
Start: 2023-11-28 | End: 2023-11-28

## 2023-11-28 RX ADMIN — TRIAMCINOLONE ACETONIDE 40 MG: 40 INJECTION, SUSPENSION INTRA-ARTICULAR; INTRAMUSCULAR at 12:06

## 2023-11-28 RX ADMIN — LIDOCAINE HYDROCHLORIDE 3 ML: 10 INJECTION, SOLUTION EPIDURAL; INFILTRATION; INTRACAUDAL; PERINEURAL at 12:06

## 2023-11-28 RX ADMIN — LIDOCAINE HYDROCHLORIDE 33 ML: 10 INJECTION, SOLUTION EPIDURAL; INFILTRATION; INTRACAUDAL; PERINEURAL at 12:06

## 2023-11-28 NOTE — PROGRESS NOTES
Procedure   - Large Joint Arthrocentesis: bilateral knee on 11/28/2023 12:06 PM  Indications: pain  Details: (23g) needle, anterolateral approach  Medications (Right): 3 mL lidocaine PF 1% 1 %; 40 mg triamcinolone acetonide 40 MG/ML  Medications (Left): 33 mL lidocaine PF 1% 1 %; 40 mg triamcinolone acetonide 40 MG/ML  Outcome: tolerated well, no immediate complications  Procedure, treatment alternatives, risks and benefits explained, specific risks discussed. Consent was given by the patient. Immediately prior to procedure a time out was called to verify the correct patient, procedure, equipment, support staff and site/side marked as required. Patient was prepped and draped in the usual sterile fashion.

## 2023-11-28 NOTE — PROGRESS NOTES
"    [facility] Note        Subjective     CC: Follow-up (1 year recheck- bilateral knee pain/)      HPI    Alex Parr is a 63 y.o. male.  Patient is here for his annual recheck for bilateral knee arthritis.  Tells me that he was injected on 11/8/2022 and got good relief until about a month ago.    Overall, patient's symptoms are as above.    ROS:    Constiutional:Pt denies fever, chills, nausea, or vomiting.  MSK:as above        Objective      Past Medical History  Past Medical History:   Diagnosis Date    Gout     Hyperlipidemia     Hypertension      Social History     Socioeconomic History    Marital status:    Tobacco Use    Smoking status: Former     Packs/day: 0.50     Years: 35.00     Additional pack years: 0.00     Total pack years: 17.50     Types: Cigarettes     Start date: 1980     Quit date: 1/22/2020     Years since quitting: 3.8    Smokeless tobacco: Never   Vaping Use    Vaping Use: Never used   Substance and Sexual Activity    Alcohol use: Yes     Alcohol/week: 14.0 standard drinks of alcohol     Types: 14 Glasses of wine per week     Comment: 2 glasses of wine/night    Drug use: No    Sexual activity: Defer          Physical Exam  /86   Ht 185.4 cm (72.99\")   Wt 88.9 kg (196 lb)   BMI 25.86 kg/m²     Body mass index is 25.86 kg/m².    Patient is well nourished and well developed.        Ortho Exam  Peripheral Vascular:    Upper Extremity:   Inspection:  Left--no cyanotic nail beds Right--no cyanotic nail beds   Bilateral:  Pink nail beds with brisk capillary refill   Palpation:  Bilateral radial pulse normal    Musculoskeletal:  Global Assessment:  Overall assessment of Lower Extremity Muscle Strength and Tone:  Left quadriceps--5/5   Left hamstrings--5/5       Left tibialis anterior--5/5  Left gastroc-soleus--5/5  Left EHL--5/5    Right quadriceps--5/5  Right hamstrings--5/5  Right tibialis anterior--5/5  Right gastroc soleus--5/5  Right EHL--5/5    Lower " Extremity:  Knee/Patella:  No digital clubbing or cyanosis.    Examination of left and right knees reveals:  Normal deep tendon reflexes, coordination, strength, tone, sensation.  No known fractures or deformities.    Inspection and Palpation:    Left knee:  Tenderness:  Over the medial joint line and moderate severity  Effusion:  1+  Crepitus:  Positive  Pulses:  2+  Ecchymosis:  None  Warmth:  None     Right knee:  Tenderness:  Over the medial joint line and moderate severity  Effusion:  1+  Crepitus:  Positive  Pulses:  2+  Ecchymosis:  None  Warmth:  None     ROM:  Right:  Extension:5    Flexion:120  Left:  Extension:5     Flexion:120    Instability:    Left:  Lachman Test:  Negative, Varus stress test negative, Valgus stress test negative  Right:  Lachman Test:  Negative, Varus stress test negative, Valgus stress test negative    Deformities/Malalignments/Discrepancies:    Left:  Genu Varum   Right:  Genu Varum    Functional Testing:  Right:  Nata's test:  Negative  Patella grind test:  Positive  Q-angle:  Normal    Left:  Nata's test:  Negative  Patella grind test:  Positive  Q-angle:  normal      Imaging/Labs/EMG Reviewed:  Imaging Results (Last 24 Hours)       Procedure Component Value Units Date/Time    XR Knee 4+ View Bilateral [[id number]] Resulted: 11/28/23 1205     Updated: 11/28/23 1206    Narrative:      Bilateral knee X-Ray    Indication: Pain    Study:  Upright AP, Skiers, Lateral, and Sunrise views of Bilateral   knee(s)    Comparison: Bilateral knee 11/8/2022    Findings:    Patient appears to have early moderate degenerative changes in the medial   compartment.    There are mild degenerative changes in the lateral compartment.    There are early moderate changes in the patellofemoral compartment.    Patient has overall varus alignment.    Kellgren-Curt Grade: 2-3      Impression:   Early moderate medial compartment and patellofemoral compartment   degnerative changes of the right and  left knee               Assessment    Assessment:  1. Primary osteoarthritis of both knees        Plan:  Recommend over the counter anti-inflammatories for pain and/or swelling  Bilateral knee arthritis--radiographically does not appear to have progressed significantly.  Plan will be for corticosteroid injections.  Follow-up as needed going forward.      Procedure Note:    I discussed with the patient the potential benefits of performing a therapeutic injections as well as potential risks including but not limited to infection, swelling, pain, bleeding, bruising, nerve/vessel damage, skin color changes, transient elevation in blood glucose levels, and fat atrophy. After informed consent and after the areas were prepped with chlorhexadine soap, ethyl chloride was used to numb the skin. Via the anterolateral approach, 3mL of 1% lidocaine followed by 40mg of Kenalog were each injected into the right and left knee joint. The patient tolerated the procedure well. There were no complications. A sterile dressing was placed over the injection sites.        Jameson Leyv MD  11/28/23  16:40 EST      Dictated Utilizing Dragon Dictation.

## 2024-01-04 RX ORDER — FAMOTIDINE 20 MG/1
20 TABLET, FILM COATED ORAL NIGHTLY
Qty: 90 TABLET | Refills: 3 | Status: SHIPPED | OUTPATIENT
Start: 2024-01-04

## 2024-01-04 NOTE — TELEPHONE ENCOUNTER
Rx Refill Note  Requested Prescriptions     Pending Prescriptions Disp Refills    famotidine (PEPCID) 20 MG tablet [Pharmacy Med Name: FAMOTIDINE 20 MG TABLET] 90 tablet 3     Sig: TAKE ONE TABLET BY MOUTH ONCE NIGHTLY      Last office visit with prescribing clinician: 9/25/2023   Last telemedicine visit with prescribing clinician: 7/12/2023   Next office visit with prescribing clinician: 3/25/2024       Kierra Burger MA  01/04/24, 09:12 EST

## 2024-02-28 RX ORDER — LISINOPRIL AND HYDROCHLOROTHIAZIDE 20; 12.5 MG/1; MG/1
1 TABLET ORAL DAILY
Qty: 90 TABLET | Refills: 1 | Status: SHIPPED | OUTPATIENT
Start: 2024-02-28

## 2024-03-25 ENCOUNTER — OFFICE VISIT (OUTPATIENT)
Dept: FAMILY MEDICINE CLINIC | Facility: CLINIC | Age: 64
End: 2024-03-25
Payer: COMMERCIAL

## 2024-03-25 VITALS
HEART RATE: 83 BPM | WEIGHT: 199.6 LBS | BODY MASS INDEX: 26.45 KG/M2 | HEIGHT: 73 IN | OXYGEN SATURATION: 98 % | SYSTOLIC BLOOD PRESSURE: 124 MMHG | DIASTOLIC BLOOD PRESSURE: 76 MMHG

## 2024-03-25 DIAGNOSIS — R73.02 IMPAIRED GLUCOSE TOLERANCE: ICD-10-CM

## 2024-03-25 DIAGNOSIS — E66.3 OVERWEIGHT (BMI 25.0-29.9): ICD-10-CM

## 2024-03-25 DIAGNOSIS — I73.9 PAD (PERIPHERAL ARTERY DISEASE): ICD-10-CM

## 2024-03-25 DIAGNOSIS — K57.30 DIVERTICULOSIS OF LARGE INTESTINE WITHOUT HEMORRHAGE: ICD-10-CM

## 2024-03-25 DIAGNOSIS — E78.5 HYPERLIPIDEMIA, UNSPECIFIED HYPERLIPIDEMIA TYPE: ICD-10-CM

## 2024-03-25 DIAGNOSIS — Z86.010 HISTORY OF COLONIC POLYPS: ICD-10-CM

## 2024-03-25 DIAGNOSIS — M10.9 GOUT INVOLVING TOE, UNSPECIFIED CAUSE, UNSPECIFIED CHRONICITY, UNSPECIFIED LATERALITY: ICD-10-CM

## 2024-03-25 DIAGNOSIS — Z00.00 HEALTHCARE MAINTENANCE: Primary | ICD-10-CM

## 2024-03-25 DIAGNOSIS — Z87.891 PERSONAL HISTORY OF NICOTINE DEPENDENCE: ICD-10-CM

## 2024-03-25 DIAGNOSIS — I10 ESSENTIAL HYPERTENSION: ICD-10-CM

## 2024-03-25 DIAGNOSIS — E55.9 VITAMIN D DEFICIENCY: ICD-10-CM

## 2024-03-25 DIAGNOSIS — K21.9 GASTROESOPHAGEAL REFLUX DISEASE, UNSPECIFIED WHETHER ESOPHAGITIS PRESENT: ICD-10-CM

## 2024-03-25 DIAGNOSIS — I77.1 SUBCLAVIAN ARTERY STENOSIS, LEFT: ICD-10-CM

## 2024-03-25 RX ORDER — MULTIVIT WITH MINERALS/LUTEIN
250 TABLET ORAL DAILY
COMMUNITY

## 2024-03-25 RX ORDER — AMLODIPINE BESYLATE 5 MG/1
5 TABLET ORAL NIGHTLY
Qty: 30 TABLET | Refills: 1 | Status: SHIPPED | OUTPATIENT
Start: 2024-03-25

## 2024-03-25 NOTE — PROGRESS NOTES
"Reason for visit    Alex Parr is a 64 y.o. male who presents for annual comprehensive exam.    Chief Complaint   Patient presents with    Annual Exam     Chest cold for the last month (some days feels okay but feels stuffy)        HPI     Here for physical.   Has follow-up with cardiology in May for his PAD.   Head congestion, cough, rhinorrhea. Works outdoors. Denies fever, chills, wheezing, soa. Using nasal spray OTC but is not sure which one.      Diet/Physical activity:  -Has not been walking recently due to congestion the past few weeks.   -Gets up to 17K steps/day while working.   -He reports his diet is \"about the same.\" Tries to eat salads and avoid fast food.     Immunizations:  -He has declined COVID/influenza vaccines in the past.   -Discussed Shingrix. Has not received this yet from his pharmacy.    Cancer screening:   -His colonoscopy was normal in 2021 - 7 year recall.  -No prior lung cancer screening.   -PSA normal 4/2023. Recheck next year.   -He denies any change in Fhx.     PHQ-9 Depression Screening  Little interest or pleasure in doing things? 0-->not at all   Feeling down, depressed, or hopeless? 0-->not at all   Trouble falling or staying asleep, or sleeping too much?     Feeling tired or having little energy?     Poor appetite or overeating?     Feeling bad about yourself - or that you are a failure or have let yourself or your family down?     Trouble concentrating on things, such as reading the newspaper or watching television?     Moving or speaking so slowly that other people could have noticed? Or the opposite - being so fidgety or restless that you have been moving around a lot more than usual?     Thoughts that you would be better off dead, or of hurting yourself in some way?     PHQ-9 Total Score 0   If you checked off any problems, how difficult have these problems made it for you to do your work, take care of things at home, or get along with other people?           Substance " use:  -He quit smoking 4 years ago.  -1/2 ppd x 40 yrs.  -2 glasses of wine/night.  -Denies recreational drug use.  -1 diet Pepsi/day at most.     AAA screen:  -Denies family history of AAA.  -Will need screening next year.       Dental/vision/skin:  -Current with dental and eye exams.  -Denies new/changing/concerning skin lesions.    Past Medical History:   Diagnosis Date    Gout     Hyperlipidemia     Hypertension        Past Surgical History:   Procedure Laterality Date    CARDIAC CATHETERIZATION N/A 2023    Procedure: Peripheral angiography;  Surgeon: Brandon Guo MD;  Location:  [facility];  Service: Cardiovascular;  Laterality: N/A;    CARDIAC CATHETERIZATION N/A 2023    Procedure: Angioplasty-peripheral;  Surgeon: Brandon Guo MD;  Location:  [facility];  Service: Cardiovascular;  Laterality: N/A;  Left subclavian intervention. No industry rep needed. Schedule in ~2-4 weeks    INGUINAL HERNIA REPAIR         Family History   Problem Relation Age of Onset    No Known Problems Mother     Stroke Father     Parkinsonism Father 80    Hypertension Father     ALS Maternal Grandmother     No Known Problems Maternal Grandfather     No Known Problems Paternal Grandmother     No Known Problems Paternal Grandfather        Social History     Socioeconomic History    Marital status:    Tobacco Use    Smoking status: Former     Current packs/day: 0.00     Average packs/day: 0.5 packs/day for 40.1 years (20.0 ttl pk-yrs)     Types: Cigarettes     Start date:      Quit date: 2020     Years since quittin.2    Smokeless tobacco: Never   Vaping Use    Vaping status: Never Used   Substance and Sexual Activity    Alcohol use: Yes     Alcohol/week: 14.0 standard drinks of alcohol     Types: 14 Glasses of wine per week     Comment: 2 glasses of wine/night    Drug use: No    Sexual activity: Defer       No Known Allergies    ROS    Review of Systems    Constitutional:  Negative for appetite change, chills, diaphoresis, fatigue, fever and unexpected weight loss.   HENT:  Positive for congestion, hearing loss and rhinorrhea. Negative for sore throat and trouble swallowing.    Eyes:  Negative for blurred vision and visual disturbance.   Respiratory:  Positive for cough. Negative for shortness of breath and wheezing.    Cardiovascular:  Negative for chest pain.   Gastrointestinal:  Negative for abdominal pain, blood in stool, constipation, diarrhea and GERD.   Endocrine: Negative for polydipsia.   Genitourinary:  Positive for nocturia (occasional, once weekly). Negative for difficulty urinating, dysuria, frequency and hematuria.   Musculoskeletal:  Positive for arthralgias.   Skin:  Negative for rash and skin lesions.   Neurological:  Positive for numbness (hands at night). Negative for dizziness and headache.   Psychiatric/Behavioral:  Negative for sleep disturbance and depressed mood. The patient is not nervous/anxious.        Vitals:    03/25/24 1133   BP: 124/76   Pulse: 83   SpO2: 98%     Body mass index is 26.34 kg/m².      Current Outpatient Medications:     aspirin 81 MG EC tablet, Take 1 tablet by mouth Daily., Disp: 90 tablet, Rfl: 3    cholecalciferol (VITAMIN D3) 25 MCG (1000 UT) tablet, Take 1 tablet by mouth Daily., Disp: , Rfl:     clopidogrel (PLAVIX) 75 MG tablet, Take 1 tablet by mouth Daily., Disp: 90 tablet, Rfl: 3    famotidine (PEPCID) 20 MG tablet, TAKE ONE TABLET BY MOUTH ONCE NIGHTLY, Disp: 90 tablet, Rfl: 3    lisinopril-hydrochlorothiazide (PRINZIDE,ZESTORETIC) 20-12.5 MG per tablet, TAKE ONE TABLET BY MOUTH DAILY, Disp: 90 tablet, Rfl: 1    Magnesium 300 MG capsule, Take 300 mg by mouth Daily., Disp: , Rfl:     rosuvastatin (CRESTOR) 20 MG tablet, Take 1 tablet by mouth Every Night., Disp: 90 tablet, Rfl: 3    vitamin B-12 (CYANOCOBALAMIN) 1000 MCG tablet, Take 1 tablet by mouth Daily., Disp: , Rfl:     vitamin C (ASCORBIC ACID) 250 MG  tablet, Take 1 tablet by mouth Daily. Takes 1 chewable tablet, Disp: , Rfl:     amLODIPine (NORVASC) 5 MG tablet, Take 1 tablet by mouth Every Night., Disp: 30 tablet, Rfl: 1    PE    Physical Exam  Vitals reviewed.   Constitutional:       General: He is not in acute distress.     Appearance: He is well-developed.   HENT:      Head: Normocephalic and atraumatic.      Right Ear: Hearing and tympanic membrane normal.      Left Ear: Hearing and tympanic membrane normal.      Mouth/Throat:      Mouth: Mucous membranes are moist.      Dentition: Normal dentition.      Pharynx: Oropharynx is clear.   Eyes:      Extraocular Movements: Extraocular movements intact.      Conjunctiva/sclera: Conjunctivae normal.      Pupils: Pupils are equal, round, and reactive to light.      Comments:      Neck:      Thyroid: No thyroid mass or thyromegaly.      Vascular: No carotid bruit.   Cardiovascular:      Rate and Rhythm: Normal rate and regular rhythm.      Pulses:           Radial pulses are 2+ on the right side and 2+ on the left side.      Heart sounds: No murmur heard.     No friction rub.   Pulmonary:      Effort: Pulmonary effort is normal.      Breath sounds: Normal breath sounds.   Abdominal:      General: Bowel sounds are normal. There is no abdominal bruit.      Palpations: Abdomen is soft. There is no mass.      Tenderness: There is no abdominal tenderness.   Musculoskeletal:         General: Normal range of motion.      Cervical back: Normal range of motion and neck supple.   Lymphadenopathy:      Cervical: No cervical adenopathy.      Upper Body:      Right upper body: No supraclavicular adenopathy.      Left upper body: No supraclavicular adenopathy.   Skin:     General: Skin is warm and dry.      Findings: No rash.      Nails: There is no clubbing.      Comments: No suspicious nevi on clothed exam.    Neurological:      Mental Status: He is alert.      Gait: Gait normal.      Deep Tendon Reflexes: Reflexes normal.    Psychiatric:         Speech: Speech normal.         Behavior: Behavior normal.         A/P    Problem List Items Addressed This Visit          Cardiac and Vasculature    Subclavian artery stenosis, left    PAD (peripheral artery disease)    Overview     5/2023 BLE duplex: Right ENEIDA normal. The left ENEIDA is moderately reduced. Waveforms are consistent with aorto-iliac disease. Abnormal  digital pressures.  6/20/2023: 90% heavily calcified left common iliac artery stenosis status post lithotripsy and stenting with a Viabahn VBX 8 x 39 mm covered stent. 70% calcified small left subclavian artery stenosis           Hyperlipidemia    Relevant Medications    rosuvastatin (CRESTOR) 20 MG tablet    Essential hypertension    Current Assessment & Plan     BP for me 150/80 right arm. Reading taken at check in was in his left arm. S/p angioplasty for left subclavian artery stenosis in July. Counseled patient to record blood pressures in his right arm going forward.   Add amlodipine 5 mg daily. Continue lisinopril-hctz.   RTC in 1 month for blood pressure check.          Relevant Medications    lisinopril-hydrochlorothiazide (PRINZIDE,ZESTORETIC) 20-12.5 MG per tablet    amLODIPine (NORVASC) 5 MG tablet       Endocrine and Metabolic    Vitamin D deficiency    Overview     1/19         Overweight (BMI 25.0-29.9)    Impaired glucose tolerance    Overview     A1C: 5.8 - 2/2020            Gastrointestinal Abdominal     History of colonic polyps    Overview     7/2021         Gastroesophageal reflux disease    Relevant Medications    famotidine (PEPCID) 20 MG tablet    Diverticulosis of large intestine       Health Encounters    Healthcare maintenance - Primary    Current Assessment & Plan     -Counseled patient regarding cancer screening, immunizations, healthy lifestyle, diet, maintaining a healthy weight, and exercise.   -Annual dental and eye exams were encouraged.   -Check screening labs as ordered.   -Order LDCT of the chest  for lung cancer screening.          Relevant Orders    CBC (No Diff)    Comprehensive Metabolic Panel    Lipid Panel    TSH Rfx On Abnormal To Free T4    Urinalysis With Culture If Indicated - Urine, Clean Catch    Vitamin D,25-Hydroxy    Uric Acid    Hemoglobin A1c       Musculoskeletal and Injuries    Gout    Overview     -No recent flares.   -1/19 uric acid 7.2 Low purine diet, limiting alcohol use, allopurinol advised            Tobacco    Personal history of nicotine dependence    Overview     Quit in 2020         Relevant Orders    CT Chest Low Dose Wo       Plan of care was reviewed with patient at the conclusion of today's visit. Education was provided regarding diagnoses, management, treatment plan, and the importance of keeping follow-up appointments. The patient was counseled regarding the risks, benefits, and possible side-effects of treatment. Patient and/or family expresses understanding and agreement with the management plan.        Zaid Pena PA-C           65.8

## 2024-04-03 PROBLEM — Z00.00 HEALTHCARE MAINTENANCE: Status: ACTIVE | Noted: 2024-04-03

## 2024-04-03 NOTE — ASSESSMENT & PLAN NOTE
BP for me 150/80 right arm. Reading taken at check in was in his left arm. S/p angioplasty for left subclavian artery stenosis in July. Counseled patient to record blood pressures in his right arm going forward.   Add amlodipine 5 mg daily. Continue lisinopril-hctz.   RTC in 1 month for blood pressure check.

## 2024-04-03 NOTE — ASSESSMENT & PLAN NOTE
-Counseled patient regarding cancer screening, immunizations, healthy lifestyle, diet, maintaining a healthy weight, and exercise.   -Annual dental and eye exams were encouraged.   -Check screening labs as ordered.   -Order LDCT of the chest for lung cancer screening.

## 2024-04-12 ENCOUNTER — OFFICE VISIT (OUTPATIENT)
Dept: FAMILY MEDICINE CLINIC | Facility: CLINIC | Age: 64
End: 2024-04-12
Payer: COMMERCIAL

## 2024-04-12 VITALS
DIASTOLIC BLOOD PRESSURE: 74 MMHG | OXYGEN SATURATION: 98 % | HEIGHT: 73 IN | WEIGHT: 199.6 LBS | HEART RATE: 89 BPM | BODY MASS INDEX: 26.45 KG/M2 | SYSTOLIC BLOOD PRESSURE: 132 MMHG | TEMPERATURE: 98.7 F

## 2024-04-12 DIAGNOSIS — M70.22 OLECRANON BURSITIS OF LEFT ELBOW: Primary | ICD-10-CM

## 2024-04-12 RX ORDER — CEPHALEXIN 500 MG/1
500 CAPSULE ORAL 3 TIMES DAILY
Qty: 21 CAPSULE | Refills: 0 | Status: SHIPPED | OUTPATIENT
Start: 2024-04-12 | End: 2024-04-19

## 2024-04-12 RX ORDER — METHYLPREDNISOLONE 4 MG/1
TABLET ORAL
Qty: 21 TABLET | Refills: 0 | Status: SHIPPED | OUTPATIENT
Start: 2024-04-12

## 2024-04-12 NOTE — ASSESSMENT & PLAN NOTE
Discussed trauma, gout, infection etiologies. I drew a line around his erythema and advised the patient to return with spreading redness, swelling, increased pain, or fever.   Treat with keflex and medrol dose pack to cover for infection, gout. Discussed arm elevation and ACE wrap for compression/swelling.   Discussed low purine diet, reducing alcohol intake.   Refer to orthopedic surgery in the event his symptoms worsen or do not resolve.

## 2024-04-12 NOTE — PROGRESS NOTES
"    Chief Complaint   Patient presents with    Elbow Pain     Left side, redness, swollen, x3 days        HPI     Alex Parr is a pleasant 64 y.o. male who presents for evaluation of \"chief complaint.\"     Patient c/o new onset left elbow swelling since yesterday. Pain began a few days ago. He may have hit it while at work but does not recall specific injury. Has been eating a lot of shrimp cocktail. Beer on weekends, glass of wine nightly. He reports a remote history of olecranon bursitis and joint aspiration with his previous PCP that showed gout crystals. He denies fever, chills.     Past Medical History:   Diagnosis Date    Gout     Hyperlipidemia     Hypertension        Past Surgical History:   Procedure Laterality Date    CARDIAC CATHETERIZATION N/A 2023    Procedure: Peripheral angiography;  Surgeon: Brandon Guo MD;  Location:  [facility];  Service: Cardiovascular;  Laterality: N/A;    CARDIAC CATHETERIZATION N/A 2023    Procedure: Angioplasty-peripheral;  Surgeon: Brandon Guo MD;  Location:  [facility];  Service: Cardiovascular;  Laterality: N/A;  Left subclavian intervention. No industry rep needed. Schedule in ~2-4 weeks    INGUINAL HERNIA REPAIR         Family History   Problem Relation Age of Onset    No Known Problems Mother     Stroke Father     Parkinsonism Father 80    Hypertension Father     ALS Maternal Grandmother     No Known Problems Maternal Grandfather     No Known Problems Paternal Grandmother     No Known Problems Paternal Grandfather        Social History     Socioeconomic History    Marital status:    Tobacco Use    Smoking status: Former     Current packs/day: 0.00     Average packs/day: 0.5 packs/day for 40.1 years (20.0 ttl pk-yrs)     Types: Cigarettes     Start date:      Quit date: 2020     Years since quittin.2    Smokeless tobacco: Never   Vaping Use    Vaping status: Never Used   Substance and Sexual " Activity    Alcohol use: Yes     Alcohol/week: 14.0 standard drinks of alcohol     Types: 14 Glasses of wine per week     Comment: 2 glasses of wine/night    Drug use: No    Sexual activity: Defer       No Known Allergies    ROS    Review of Systems   Constitutional:  Negative for chills and fever.   Musculoskeletal:  Positive for arthralgias and joint swelling.   Skin:  Positive for rash.       Vitals:    04/12/24 1019   BP: 132/74   Pulse: 89   Temp: 98.7 °F (37.1 °C)   SpO2: 98%     Body mass index is 26.34 kg/m².      Current Outpatient Medications:     amLODIPine (NORVASC) 5 MG tablet, Take 1 tablet by mouth Every Night., Disp: 30 tablet, Rfl: 1    aspirin 81 MG EC tablet, Take 1 tablet by mouth Daily., Disp: 90 tablet, Rfl: 3    cholecalciferol (VITAMIN D3) 25 MCG (1000 UT) tablet, Take 1 tablet by mouth Daily., Disp: , Rfl:     clopidogrel (PLAVIX) 75 MG tablet, Take 1 tablet by mouth Daily., Disp: 90 tablet, Rfl: 3    famotidine (PEPCID) 20 MG tablet, TAKE ONE TABLET BY MOUTH ONCE NIGHTLY, Disp: 90 tablet, Rfl: 3    lisinopril-hydrochlorothiazide (PRINZIDE,ZESTORETIC) 20-12.5 MG per tablet, TAKE ONE TABLET BY MOUTH DAILY, Disp: 90 tablet, Rfl: 1    Magnesium 300 MG capsule, Take 300 mg by mouth Daily., Disp: , Rfl:     rosuvastatin (CRESTOR) 20 MG tablet, Take 1 tablet by mouth Every Night., Disp: 90 tablet, Rfl: 3    vitamin B-12 (CYANOCOBALAMIN) 1000 MCG tablet, Take 1 tablet by mouth Daily., Disp: , Rfl:     vitamin C (ASCORBIC ACID) 250 MG tablet, Take 1 tablet by mouth Daily. Takes 1 chewable tablet, Disp: , Rfl:     cephalexin (Keflex) 500 MG capsule, Take 1 capsule by mouth 3 times a day for 7 days., Disp: 21 capsule, Rfl: 0    methylPREDNISolone (MEDROL) 4 MG dose pack, Take as directed on package instructions., Disp: 21 tablet, Rfl: 0    PE    Physical Exam  Vitals reviewed.   Constitutional:       General: He is not in acute distress.  Pulmonary:      Effort: Pulmonary effort is normal. No  respiratory distress.   Musculoskeletal:        Arms:    Neurological:      Mental Status: He is alert.   Psychiatric:         Mood and Affect: Mood normal.          A/P    Problem List Items Addressed This Visit          Musculoskeletal and Injuries    Olecranon bursitis of left elbow - Primary    Current Assessment & Plan     Discussed trauma, gout, infection etiologies. I drew a line around his erythema and advised the patient to return with spreading redness, swelling, increased pain, or fever.   Treat with keflex and medrol dose pack to cover for infection, gout. Discussed arm elevation and ACE wrap for compression/swelling.   Discussed low purine diet, reducing alcohol intake.   Refer to orthopedic surgery in the event his symptoms worsen or do not resolve.          Relevant Orders    Ambulatory Referral to Orthopedic Surgery       Plan of care was reviewed with patient at the conclusion of today's visit. Education was provided regarding diagnoses, management, prescribed or recommended OTC products, and the importance of compliance with follow-up appointments. The patient was counseled regarding the risks, benefits, and possible side-effects of treatment. I advised the patient to keep me informed of any acute changes in their status including new, worsening, or persistent symptoms. Patient expresses understanding and agreement with the management plan.        Zaid Pena PA-C

## 2024-04-22 ENCOUNTER — HOSPITAL ENCOUNTER (OUTPATIENT)
Dept: CT IMAGING | Facility: HOSPITAL | Age: 64
Discharge: HOME | End: 2024-04-22
Admitting: PHYSICIAN ASSISTANT
Payer: COMMERCIAL

## 2024-04-22 DIAGNOSIS — Z87.891 PERSONAL HISTORY OF NICOTINE DEPENDENCE: ICD-10-CM

## 2024-04-22 PROCEDURE — 71271 CT THORAX LUNG CANCER SCR C-: CPT

## 2024-04-29 ENCOUNTER — OFFICE VISIT (OUTPATIENT)
Dept: FAMILY MEDICINE CLINIC | Facility: CLINIC | Age: 64
End: 2024-04-29
Payer: COMMERCIAL

## 2024-04-29 ENCOUNTER — LAB (OUTPATIENT)
Dept: LAB | Facility: HOSPITAL | Age: 64
End: 2024-04-29
Payer: COMMERCIAL

## 2024-04-29 VITALS
HEART RATE: 99 BPM | OXYGEN SATURATION: 95 % | DIASTOLIC BLOOD PRESSURE: 74 MMHG | WEIGHT: 198.6 LBS | SYSTOLIC BLOOD PRESSURE: 132 MMHG | HEIGHT: 73 IN | BODY MASS INDEX: 26.32 KG/M2

## 2024-04-29 DIAGNOSIS — Z00.00 HEALTHCARE MAINTENANCE: ICD-10-CM

## 2024-04-29 DIAGNOSIS — E78.5 HYPERLIPIDEMIA, UNSPECIFIED HYPERLIPIDEMIA TYPE: ICD-10-CM

## 2024-04-29 DIAGNOSIS — R42 POSITIONAL LIGHTHEADEDNESS: ICD-10-CM

## 2024-04-29 DIAGNOSIS — I10 ESSENTIAL HYPERTENSION: Primary | ICD-10-CM

## 2024-04-29 PROCEDURE — 82306 VITAMIN D 25 HYDROXY: CPT

## 2024-04-29 PROCEDURE — 99213 OFFICE O/P EST LOW 20 MIN: CPT | Performed by: PHYSICIAN ASSISTANT

## 2024-04-29 PROCEDURE — 84550 ASSAY OF BLOOD/URIC ACID: CPT

## 2024-04-29 PROCEDURE — 84443 ASSAY THYROID STIM HORMONE: CPT

## 2024-04-29 PROCEDURE — 85027 COMPLETE CBC AUTOMATED: CPT

## 2024-04-29 PROCEDURE — 83036 HEMOGLOBIN GLYCOSYLATED A1C: CPT

## 2024-04-29 PROCEDURE — 80061 LIPID PANEL: CPT

## 2024-04-29 PROCEDURE — 80053 COMPREHEN METABOLIC PANEL: CPT

## 2024-04-29 PROCEDURE — 81001 URINALYSIS AUTO W/SCOPE: CPT

## 2024-04-29 RX ORDER — AMLODIPINE BESYLATE 5 MG/1
5 TABLET ORAL NIGHTLY
Qty: 90 TABLET | Refills: 2 | Status: SHIPPED | OUTPATIENT
Start: 2024-04-29

## 2024-04-29 NOTE — PROGRESS NOTES
Chief Complaint   Patient presents with    Hypertension     1 mo f/u         HPI     Alex Parr is a 64 y.o. male who is here for 1 month follow-up of hypertension.     The patient was started on amlodipine 5 mg daily after his last visit. After taking his medications, he states his blood pressure comes down to the 120s/80s. He denies notices side-effects, GERD, constipation, leg swelling. He does have some positional lightheadedness though this has been chronic and stable.     Past Medical History:   Diagnosis Date    Gout     Hyperlipidemia     Hypertension        Past Surgical History:   Procedure Laterality Date    CARDIAC CATHETERIZATION N/A 2023    Procedure: Peripheral angiography;  Surgeon: Brandon Guo MD;  Location:  [facility];  Service: Cardiovascular;  Laterality: N/A;    CARDIAC CATHETERIZATION N/A 2023    Procedure: Angioplasty-peripheral;  Surgeon: Brandon Guo MD;  Location:  [facility];  Service: Cardiovascular;  Laterality: N/A;  Left subclavian intervention. No industry rep needed. Schedule in ~2-4 weeks    INGUINAL HERNIA REPAIR         Family History   Problem Relation Age of Onset    No Known Problems Mother     Stroke Father     Parkinsonism Father 80    Hypertension Father     ALS Maternal Grandmother     No Known Problems Maternal Grandfather     No Known Problems Paternal Grandmother     No Known Problems Paternal Grandfather        Social History     Socioeconomic History    Marital status:    Tobacco Use    Smoking status: Former     Current packs/day: 0.00     Average packs/day: 0.5 packs/day for 40.1 years (20.0 ttl pk-yrs)     Types: Cigarettes     Start date:      Quit date: 2020     Years since quittin.2    Smokeless tobacco: Never   Vaping Use    Vaping status: Never Used   Substance and Sexual Activity    Alcohol use: Yes     Alcohol/week: 14.0 standard drinks of alcohol     Types: 14 Glasses of wine  per week     Comment: 2 glasses of wine/night    Drug use: No    Sexual activity: Defer       No Known Allergies    ROS    Review of Systems   Eyes:  Negative for blurred vision.   Respiratory:  Negative for shortness of breath.    Cardiovascular:  Negative for chest pain.   Neurological:  Positive for light-headedness. Negative for headache.       Vitals:    04/29/24 1058   BP: 132/74   Pulse: 99   SpO2: 95%     Body mass index is 26.21 kg/m².      Current Outpatient Medications:     amLODIPine (NORVASC) 5 MG tablet, Take 1 tablet by mouth Every Night., Disp: 90 tablet, Rfl: 2    aspirin 81 MG EC tablet, Take 1 tablet by mouth Daily., Disp: 90 tablet, Rfl: 3    cholecalciferol (VITAMIN D3) 25 MCG (1000 UT) tablet, Take 1 tablet by mouth Daily., Disp: , Rfl:     clopidogrel (PLAVIX) 75 MG tablet, Take 1 tablet by mouth Daily., Disp: 90 tablet, Rfl: 3    famotidine (PEPCID) 20 MG tablet, TAKE ONE TABLET BY MOUTH ONCE NIGHTLY, Disp: 90 tablet, Rfl: 3    lisinopril-hydrochlorothiazide (PRINZIDE,ZESTORETIC) 20-12.5 MG per tablet, TAKE ONE TABLET BY MOUTH DAILY, Disp: 90 tablet, Rfl: 1    Magnesium 300 MG capsule, Take 300 mg by mouth Daily., Disp: , Rfl:     methylPREDNISolone (MEDROL) 4 MG dose pack, Take as directed on package instructions., Disp: 21 tablet, Rfl: 0    rosuvastatin (CRESTOR) 20 MG tablet, Take 1 tablet by mouth Every Night., Disp: 90 tablet, Rfl: 3    vitamin B-12 (CYANOCOBALAMIN) 1000 MCG tablet, Take 1 tablet by mouth Daily., Disp: , Rfl:     vitamin C (ASCORBIC ACID) 250 MG tablet, Take 1 tablet by mouth Daily. Takes 1 chewable tablet, Disp: , Rfl:     PE    Physical Exam  Vitals reviewed.   Constitutional:       General: He is not in acute distress.     Appearance: He is well-developed.   HENT:      Head: Normocephalic and atraumatic.   Eyes:      Conjunctiva/sclera: Conjunctivae normal.   Cardiovascular:      Rate and Rhythm: Normal rate and regular rhythm.      Heart sounds: Normal heart sounds.  No murmur heard.  Pulmonary:      Effort: Pulmonary effort is normal.      Breath sounds: Normal breath sounds.   Musculoskeletal:      Cervical back: Normal range of motion.   Skin:     General: Skin is warm and dry.   Neurological:      Mental Status: He is alert.      Gait: Gait normal.   Psychiatric:         Speech: Speech normal.         Behavior: Behavior normal.         Results    Results for orders placed or performed during the hospital encounter of 07/20/23   Comprehensive Metabolic Panel    Specimen: Blood   Result Value Ref Range    Glucose 104 (H) 65 - 99 mg/dL    BUN 31 (H) 8 - 23 mg/dL    Creatinine 1.08 0.76 - 1.27 mg/dL    Sodium 131 (L) 136 - 145 mmol/L    Potassium 4.4 3.5 - 5.2 mmol/L    Chloride 96 (L) 98 - 107 mmol/L    CO2 22.0 22.0 - 29.0 mmol/L    Calcium 10.2 8.6 - 10.5 mg/dL    Total Protein 6.9 6.0 - 8.5 g/dL    Albumin 4.3 3.5 - 5.2 g/dL    ALT (SGPT) 6 1 - 41 U/L    AST (SGOT) 12 1 - 40 U/L    Alkaline Phosphatase 83 39 - 117 U/L    Total Bilirubin 0.6 0.0 - 1.2 mg/dL    Globulin 2.6 gm/dL    A/G Ratio 1.7 g/dL    BUN/Creatinine Ratio 28.7 (H) 7.0 - 25.0    Anion Gap 13.0 5.0 - 15.0 mmol/L    eGFR 77.1 >60.0 mL/min/1.73   Lipid Panel    Specimen: Blood   Result Value Ref Range    Total Cholesterol 121 0 - 200 mg/dL    Triglycerides 143 0 - 150 mg/dL    HDL Cholesterol 52 40 - 60 mg/dL    LDL Cholesterol  45 0 - 100 mg/dL    VLDL Cholesterol 24 5 - 40 mg/dL    LDL/HDL Ratio 0.78    Hemoglobin A1c    Specimen: Blood   Result Value Ref Range    Hemoglobin A1C 5.70 (H) 4.80 - 5.60 %   CBC Auto Differential    Specimen: Blood   Result Value Ref Range    WBC 14.25 (H) 3.40 - 10.80 10*3/mm3    RBC 4.49 4.14 - 5.80 10*6/mm3    Hemoglobin 13.3 13.0 - 17.7 g/dL    Hematocrit 40.4 37.5 - 51.0 %    MCV 90.0 79.0 - 97.0 fL    MCH 29.6 26.6 - 33.0 pg    MCHC 32.9 31.5 - 35.7 g/dL    RDW 12.9 12.3 - 15.4 %    RDW-SD 42.5 37.0 - 54.0 fl    MPV 9.4 6.0 - 12.0 fL    Platelets 303 140 - 450 10*3/mm3     Neutrophil % 72.4 42.7 - 76.0 %    Lymphocyte % 15.2 (L) 19.6 - 45.3 %    Monocyte % 10.4 5.0 - 12.0 %    Eosinophil % 0.4 0.3 - 6.2 %    Basophil % 0.4 0.0 - 1.5 %    Immature Grans % 1.2 (H) 0.0 - 0.5 %    Neutrophils, Absolute 10.33 (H) 1.70 - 7.00 10*3/mm3    Lymphocytes, Absolute 2.17 0.70 - 3.10 10*3/mm3    Monocytes, Absolute 1.48 (H) 0.10 - 0.90 10*3/mm3    Eosinophils, Absolute 0.05 0.00 - 0.40 10*3/mm3    Basophils, Absolute 0.05 0.00 - 0.20 10*3/mm3    Immature Grans, Absolute 0.17 (H) 0.00 - 0.05 10*3/mm3    nRBC 0.0 0.0 - 0.2 /100 WBC   POC CHEM 8    Specimen: Blood   Result Value Ref Range    Glucose 103 70 - 130 mg/dL    BUN 30 (H) 8 - 26 mg/dL    Creatinine 1.10 0.60 - 1.30 mg/dL    Sodium 132 (L) 138 - 146 mmol/L    POC Potassium 4.2 3.5 - 4.9 mmol/L    Chloride 99 98 - 109 mmol/L    Total CO2 22 (L) 24 - 29 mmol/L    Hemoglobin 13.3 12.0 - 17.0 g/dL    Hematocrit 39 38 - 51 %    Ionized Calcium 1.38 (H) 1.20 - 1.32 mmol/L    eGFR 75.4 >60.0 mL/min/1.73   POC Activated Clotting Time    Specimen: Blood   Result Value Ref Range    Activated Clotting Time  371 (H) 82 - 152 Seconds   POC Activated Clotting Time    Specimen: Blood   Result Value Ref Range    Activated Clotting Time  305 (H) 82 - 152 Seconds   Duplex Lower Extremity Art / Grafts - Bilateral CAR   Result Value Ref Range    SFA Prox PSV-Right 121.1 cm/s    SFA Prox EDV-Right 0.00 cm/s    SFA Mid PSV-Right 82.2 cm/s    SFA Mid EDV-Right 0.35 cm/s    SFA Distal PSV-Right 62.2 cm/s    SFA Distal EDV-Right 0.25 cm/s    Popiteal A Prox PSV-Right 73.0 cm/s    Popiteal A Prox EDV-Right 0.00 cm/s    Popiteal A Distal PSV-Right 54.7 cm/s    Popiteal A Distal EDV-Right 0.25 cm/s    Ant Tibial A Prox PSV-Right 64.1 cm/s    Ant Tibial A Prox EDV-Right 0.00 cm/s    Ant Tibial A Mid PSV-Right 67.3 cm/s    Ant Tibial A Mid EDV-Right 0.19 cm/s    Ant Tibial A Distal PSV-Right 73.0 cm/s    Ant Tibial A Distal EDV-Right 0.00 cm/s    PTA Prox PSV-Right 51.1  cm/s    PTA Prox EDV-Right 0.00 cm/s    PTA Mid PSV-Right 44.8 cm/s    PTA Mid EDV-Right 0.00 cm/s    PTA Distal PSV-Right 26.4 cm/s    PTA Distal EDV-Right 0.00 cm/s    Peroneal Mid PSV-Right 35.0 cm/s    Peroneal Mid EDV-Right 0.16 cm/s    SFA Prox PSV-Left 105.7 cm/s    SFA Prox EDV-Left 0.00 cm/s    SFA Mid PSV-Left 65.3 cm/s    SFA Mid EDV-Left 0.31 cm/s    SFA Distal PSV-Left 65.3 cm/s    SFA Distal EDV-Left 0.31 cm/s    Popiteal A Prox PSV-Left 78.7 cm/s    Popiteal A Prox EDV-Left 0.00 cm/s    Popiteal A Distal PSV-Left 65.3 cm/s    Popiteal A Distal EDV-Left 0.31 cm/s    Ant Tibial A Prox PSV-Left 55.5 cm/s    Ant Tibial A Prox EDV-Left 0.00 cm/s    Ant Tibial A Mid PSV-Left 49.7 cm/s    Ant Tibial A Mid EDV-Left 0.25 cm/s    Ant Tibial A Distal PSV-Left 61.7 cm/s    Ant Tibial A Distal EDV-Left 0.00 cm/s    PTA Prox PSV-Left 55.1 cm/s    PTA Prox EDV-Left 0.00 cm/s    PTA Mid PSV-Left 72.6 cm/s    PTA Mid EDV-Left 0.00 cm/s    PTA Distal PSV-Left 83.0 cm/s    PTA Distal EDV-Left 0.00 cm/s    Peroneal Mid PSV-Left 21.5 cm/s    Peroneal Mid EDV-Left 0.00 cm/s    CFA Distal PSV-Right 134.03 cm/s    CFA Distal EDV-Right 0.00 cm/s    DFA Prox PSV-Right 98.33 cm/s    DFA Prox EDV-Right 0.43 cm/s    Rt. Tibeoperoneal PSV 34.99 cm/s    Rt Tibeoperoneal EDV 0.16 cm/s    Left Brachial Pressure 124 mmHg    CFA Distal PSV-Left 122.59 cm/s    CFA Distal EDV-Left 0.00 cm/s    DFA Prox PSV-Left 88.85 cm/s    DFA Prox EDV-Left 0.00 cm/s    Lt. Tibperoneal PSV 62.25 cm/s    Lt. Tibeoperoneal EDV 0.25 cm/s    RIGHT PTA PRESSURE 118 mmHg    LEFT PTA PRESSURE 122 mmHg    RIGHT DPA PRESSURE 114 mmHg    LEFT DPA PRESSURE 122 mmHg    RIGHT ENEIDA RATIO 0.95     LEFT ENEIDA RATIO 0.98        A/P    Problem List Items Addressed This Visit          Cardiac and Vasculature    Essential hypertension - Primary    Relevant Medications    amLODIPine (NORVASC) 5 MG tablet     Other Visit Diagnoses       Positional lightheadedness               -BP control is much improved.   -Lightheadedness is chronic. May be related to diuretic therapy. Discussed importance of adequate fluid intake.   -Continue amlodipine and lisinopril-hydrochlorothiazide.   -RTC in 6 months for hypertension follow-up, sooner prn.     Plan of care was reviewed with patient at the conclusion of today's visit. Education was provided regarding diagnoses, management, and the importance of keeping follow-up appointments. The patient was counseled regarding the risks, benefits, and possible side-effects of treatment. Patient and/or family express understanding and agreement with the management plan.        Zaid Pena PA-C

## 2024-04-30 LAB
25(OH)D3 SERPL-MCNC: 45.1 NG/ML (ref 30–100)
ALBUMIN SERPL-MCNC: 4.5 G/DL (ref 3.5–5.2)
ALBUMIN/GLOB SERPL: 1.6 G/DL
ALP SERPL-CCNC: 85 U/L (ref 39–117)
ALT SERPL W P-5'-P-CCNC: 11 U/L (ref 1–41)
ANION GAP SERPL CALCULATED.3IONS-SCNC: 13 MMOL/L (ref 5–15)
AST SERPL-CCNC: 22 U/L (ref 1–40)
BACTERIA UR QL AUTO: NORMAL /HPF
BILIRUB SERPL-MCNC: 0.7 MG/DL (ref 0–1.2)
BILIRUB UR QL STRIP: NEGATIVE
BUN SERPL-MCNC: 15 MG/DL (ref 8–23)
BUN/CREAT SERPL: 14.7 (ref 7–25)
CALCIUM SPEC-SCNC: 9.7 MG/DL (ref 8.6–10.5)
CHLORIDE SERPL-SCNC: 100 MMOL/L (ref 98–107)
CHOLEST SERPL-MCNC: 180 MG/DL (ref 0–200)
CLARITY UR: CLEAR
CO2 SERPL-SCNC: 22 MMOL/L (ref 22–29)
COLOR UR: YELLOW
CREAT SERPL-MCNC: 1.02 MG/DL (ref 0.76–1.27)
DEPRECATED RDW RBC AUTO: 38.5 FL (ref 37–54)
EGFRCR SERPLBLD CKD-EPI 2021: 82.1 ML/MIN/1.73
ERYTHROCYTE [DISTWIDTH] IN BLOOD BY AUTOMATED COUNT: 11.8 % (ref 12.3–15.4)
GLOBULIN UR ELPH-MCNC: 2.8 GM/DL
GLUCOSE SERPL-MCNC: 104 MG/DL (ref 65–99)
GLUCOSE UR STRIP-MCNC: NEGATIVE MG/DL
HBA1C MFR BLD: 5.7 % (ref 4.8–5.6)
HCT VFR BLD AUTO: 43.4 % (ref 37.5–51)
HDLC SERPL-MCNC: 51 MG/DL (ref 40–60)
HGB BLD-MCNC: 14.6 G/DL (ref 13–17.7)
HGB UR QL STRIP.AUTO: NEGATIVE
HOLD SPECIMEN: NORMAL
HYALINE CASTS UR QL AUTO: NORMAL /LPF
KETONES UR QL STRIP: NEGATIVE
LDLC SERPL CALC-MCNC: 111 MG/DL (ref 0–100)
LDLC/HDLC SERPL: 2.13 {RATIO}
LEUKOCYTE ESTERASE UR QL STRIP.AUTO: ABNORMAL
MCH RBC QN AUTO: 30.4 PG (ref 26.6–33)
MCHC RBC AUTO-ENTMCNC: 33.6 G/DL (ref 31.5–35.7)
MCV RBC AUTO: 90.2 FL (ref 79–97)
NITRITE UR QL STRIP: NEGATIVE
PH UR STRIP.AUTO: 6.5 [PH] (ref 5–8)
PLATELET # BLD AUTO: 259 10*3/MM3 (ref 140–450)
PMV BLD AUTO: 10.3 FL (ref 6–12)
POTASSIUM SERPL-SCNC: 4.6 MMOL/L (ref 3.5–5.2)
PROT SERPL-MCNC: 7.3 G/DL (ref 6–8.5)
PROT UR QL STRIP: NEGATIVE
RBC # BLD AUTO: 4.81 10*6/MM3 (ref 4.14–5.8)
RBC # UR STRIP: NORMAL /HPF
REF LAB TEST METHOD: NORMAL
SODIUM SERPL-SCNC: 135 MMOL/L (ref 136–145)
SP GR UR STRIP: 1.01 (ref 1–1.03)
SQUAMOUS #/AREA URNS HPF: NORMAL /HPF
TRIGL SERPL-MCNC: 101 MG/DL (ref 0–150)
TSH SERPL DL<=0.05 MIU/L-ACNC: 1.2 UIU/ML (ref 0.27–4.2)
URATE SERPL-MCNC: 7.4 MG/DL (ref 3.4–7)
UROBILINOGEN UR QL STRIP: ABNORMAL
VLDLC SERPL-MCNC: 18 MG/DL (ref 5–40)
WBC # UR STRIP: NORMAL /HPF
WBC NRBC COR # BLD AUTO: 9.41 10*3/MM3 (ref 3.4–10.8)

## 2024-05-06 ENCOUNTER — OFFICE VISIT (OUTPATIENT)
Dept: CARDIOLOGY | Facility: CLINIC | Age: 64
End: 2024-05-06
Payer: COMMERCIAL

## 2024-05-06 VITALS
DIASTOLIC BLOOD PRESSURE: 64 MMHG | SYSTOLIC BLOOD PRESSURE: 128 MMHG | OXYGEN SATURATION: 96 % | HEART RATE: 95 BPM | BODY MASS INDEX: 26.68 KG/M2 | HEIGHT: 72 IN | WEIGHT: 197 LBS

## 2024-05-06 DIAGNOSIS — E78.5 HYPERLIPIDEMIA, UNSPECIFIED HYPERLIPIDEMIA TYPE: ICD-10-CM

## 2024-05-06 DIAGNOSIS — I10 ESSENTIAL HYPERTENSION: ICD-10-CM

## 2024-05-06 DIAGNOSIS — I73.9 PAD (PERIPHERAL ARTERY DISEASE): Primary | ICD-10-CM

## 2024-05-06 PROCEDURE — 99213 OFFICE O/P EST LOW 20 MIN: CPT | Performed by: HOSPITALIST

## 2024-05-20 RX ORDER — ASPIRIN 81 MG/1
81 TABLET, COATED ORAL DAILY
Qty: 90 TABLET | Refills: 0 | Status: SHIPPED | OUTPATIENT
Start: 2024-05-20

## 2024-08-01 ENCOUNTER — SPECIALTY PHARMACY (OUTPATIENT)
Dept: CARDIOLOGY | Facility: CLINIC | Age: 64
End: 2024-08-01
Payer: COMMERCIAL

## 2024-08-01 ENCOUNTER — TELEPHONE (OUTPATIENT)
Dept: CARDIOLOGY | Facility: CLINIC | Age: 64
End: 2024-08-01

## 2024-08-01 NOTE — PROGRESS NOTES
Specialty Pharmacy Patient Management Program  Cardiology Initial Assessment     Alex Parr was referred by a Cardiology provider to the Cardiology Patient Management program offered by [facility] for Hyperlipidemia on 24.  An initial outreach was conducted, including assessment of therapy appropriateness and specialty medication education for Repatha. The patient was introduced to services offered by [facility], including: regular assessments, refill coordination, mail order delivery options, prior authorization maintenance, and financial assistance programs as applicable. The patient was also provided with contact information for the pharmacy team.     Insurance Coverage & Financial Support  [facility]     Relevant Past Medical History and Comorbidities  Relevant medical history and concomitant health conditions were discussed with the patient. The patient's chart has been reviewed for relevant past medical history and comorbid conditions and updated as necessary.  Past Medical History:   Diagnosis Date    Gout     Hyperlipidemia     Hypertension      Social History     Socioeconomic History    Marital status:    Tobacco Use    Smoking status: Former     Current packs/day: 0.00     Average packs/day: 0.5 packs/day for 40.1 years (20.0 ttl pk-yrs)     Types: Cigarettes     Start date:      Quit date: 2020     Years since quittin.5    Smokeless tobacco: Never   Vaping Use    Vaping status: Never Used   Substance and Sexual Activity    Alcohol use: Yes     Alcohol/week: 14.0 standard drinks of alcohol     Types: 14 Glasses of wine per week     Comment: 2 glasses of wine/night    Drug use: No    Sexual activity: Defer       Problem list reviewed by Kerry Wright, PharmD on 2024 at  2:04 PM    Allergies  Known allergies and reactions were discussed with the patient. The patient's chart has been reviewed for  allergy information and  updated as necessary.   No Known Allergies    Allergies reviewed by Kerry Wright, PharmD on 8/1/2024 at  2:04 PM    Relevant Laboratory Values  Relevant laboratory values were discussed with the patient. The following specialty medication dose adjustment(s) are recommended: None    Lab Results   Component Value Date    GLUCOSE 104 (H) 04/29/2024    CALCIUM 9.7 04/29/2024     (L) 04/29/2024    K 4.6 04/29/2024    CO2 22.0 04/29/2024     04/29/2024    BUN 15 04/29/2024    CREATININE 1.02 04/29/2024    EGFRIFAFRI 92 02/22/2021    EGFRIFNONA 80 02/22/2021    BCR 14.7 04/29/2024    ANIONGAP 13.0 04/29/2024     Lab Results   Component Value Date    CHOL 180 04/29/2024    CHLPL 177 04/04/2022    TRIG 101 04/29/2024    HDL 51 04/29/2024     (H) 04/29/2024         Current Medication List  This medication list has been reviewed with the patient and evaluated for any interactions or necessary modifications/recommendations, and updated to include all prescription medications, OTC medications, and supplements the patient is currently taking.  This list reflects what is contained in the patient's profile, which has also been marked as reviewed to communicate to other providers it is the most up to date version of the patient's current medication therapy.     Current Outpatient Medications:     Evolocumab (REPATHA) solution auto-injector SureClick injection, Inject 1 mL under the skin into the appropriate area as directed Every 14 (Fourteen) Days., Disp: 2 mL, Rfl: 11    amLODIPine (NORVASC) 5 MG tablet, Take 1 tablet by mouth Every Night., Disp: 90 tablet, Rfl: 2    aspirin (Aspirin Low Dose) 81 MG EC tablet, TAKE 1 TABLET BY MOUTH DAILY, Disp: 90 tablet, Rfl: 0    cholecalciferol (VITAMIN D3) 25 MCG (1000 UT) tablet, Take 1 tablet by mouth Daily., Disp: , Rfl:     famotidine (PEPCID) 20 MG tablet, TAKE ONE TABLET BY MOUTH ONCE NIGHTLY, Disp: 90 tablet, Rfl: 3    lisinopril-hydrochlorothiazide  (PRINZIDE,ZESTORETIC) 20-12.5 MG per tablet, TAKE ONE TABLET BY MOUTH DAILY, Disp: 90 tablet, Rfl: 1    Magnesium 300 MG capsule, Take 300 mg by mouth Daily., Disp: , Rfl:     rosuvastatin (CRESTOR) 20 MG tablet, Take 1 tablet by mouth Every Night., Disp: 90 tablet, Rfl: 3    vitamin B-12 (CYANOCOBALAMIN) 1000 MCG tablet, Take 1 tablet by mouth Daily., Disp: , Rfl:     vitamin C (ASCORBIC ACID) 250 MG tablet, Take 1 tablet by mouth Daily. Takes 1 chewable tablet, Disp: , Rfl:     Medicines reviewed by Kerry Wright, PharmD on 8/1/2024 at  2:04 PM    Drug Interactions  None  Recommended Medications Assessment  Aspirin: Currently Taking   Statin: Currently Taking   ACEi/ARB: Currently Taking     Initial Education Provided for Specialty Medication  The patient has been provided with the following education and any applicable administration techniques (i.e. self-injection) have been demonstrated for the therapies indicated. All questions and concerns have been addressed prior to the patient receiving the medication, and the patient has verbalized comprehension of the education and any materials provided. Additional patient education shall be provided and documented upon request by the patient, provider, or payer.    REPATHA® (evolocumab)  Medication Expectations   Why am I taking this medication? You are taking Repatha to lower your “bad” cholesterol (LDL-C). This medication can be used in adults with high blood cholesterol including primary hyperlipidemia and familial hypercholesterolemia.    What should I expect while on this medication? You should expect to see your cholesterol improve over time. Specifically, you should see your LDL-C decrease.    How does the medication work? Repatha works by blocking a protein called PCSK9 that contributes to high levels of bad cholesterol. It helps increase your liver's ability to remove bad cholesterol from your blood.     How long will I be on this medication for? The amount  of time you will be on this medication will be determined by your doctor based on your cholesterol and/or your risk of having a cardiac event. You will most likely be on this medication or another cholesterol medication throughout your lifetime. Do not abruptly stop this medication without talking to your doctor first.    How do I take this medication? Take as directed on your prescription label. Repatha is injected under the skin (subcutaneously) of your stomach, thigh, or upper arm. This medication is usually given one or twice a month.   What are some possible side effects? The most common side effects of Repatha include redness, itching, swelling, or pain/tenderness at the injection site, symptoms of the common cold, flu or flu-like symptoms or back pain.    What happens if I miss a dose? If you miss a dose, take it as soon as you remember if it is within 7 days from the usual day of administration then resume your original schedule. If it is beyond 7 days and you use the fabiola-2-week dose, skip the missed dose and resume your normal dosing schedule.If it is beyond 7 days and you use the once-monthly dose, inject the dose and start a new schedule based on that date.      Medication Safety   What are things I should warn my doctor immediately about? Talk to your doctor if you are pregnant, planning to become pregnant, or breastfeeding. Stop the medication and tell your doctor or seek emergency medical help if you notice any signs/symptoms of an allergic reaction (severe rash, redness, hives, severe itching, trouble breathing, or swelling of the face, lips, or tongue). If you have a rubber or latex allergy, you should not use the Repatha SureClick® Autoinjector pen or the prefilled syringe, please notify your doctor or pharmacist.   What are things that I should be cautious of? Be cautious of any side effects from this medication. Talk to your doctor if any new ones develop or aren't getting better.   What are some  medications that can interact with this one? There are no known significant drug interactions with Repatha. Always tell your doctor or pharmacist immediately if you start taking any new medications, including over-the-counter medications, vitamins, and herbal supplements.      Medication Storage/Handling   How should I handle this medication? Do not shake or expose the pens, cartridges, or syringes to extreme heat or direct sunlight. Keep this medication out of reach of pets/children. Allow medication to warm at room temperature prior to administration.   How does this medication need to be stored? Store unused pens, cartridges, or syringes in the refrigerator in the original cartons to protect from light. If needed, Repatha may be kept at room temperature in the original carton for up to 30 days. Do not freeze.    How should I dispose of this medication? All the Repatha devices are single-dose and should be discarded in a sharps container after use. If your doctor decides to stop this medication, take to your local police station for proper disposal. Some pharmacies also have take-back bins for medication drop-off.      Resources/Support   How can I remind myself to take this medication? You can download reminder apps to help you manage your refills. You may also set an alarm on your phone to remind you to take your dose.    Is financial support available?  [employer] can provide co-pay cards if you have commercial insurance or patient assistance if you have Medicare or no insurance.    Which vaccines are recommended for me? Talk to your doctor about these vaccines: Flu, Coronavirus (COVID-19), Pneumococcal (pneumonia), Tdap, Hepatitis B, Zoster (shingles)          Adherence and Self-Administration  Adherence related to the patient's specialty therapy was discussed with the patient. The Adherence segment of this outreach has been reviewed and updated.     Is there a concern with patient's ability to self administer the  medication correctly and without issue?: No  Were any potential barriers to adherence identified during the initial assessment or patient education?: No  Are there any concerns regarding the patient's understanding of the importance of medication adherence?: No  Methods for Supporting Patient Adherence and/or Self-Administration: None needed    Open Medication Therapy Problems  No medication therapy recommendations to display    Goals of Therapy  Goals related to the patient's specialty therapy were discussed with the patient. The Patient Goals segment of this outreach has been reviewed and updated.   Goals Addressed Today        Specialty Pharmacy General Goal      LDL Goal < 100 mg/dL    Lab Results   Component Value Date     (H) 04/29/2024    LDL 45 07/20/2023    LDL 87 06/20/2023    LDL 95 04/24/2023     (H) 04/04/2022                  Reassessment Plan & Follow-Up  1. Medication Therapy Changes: Continue Repatha 140mg subcutaneously every 14 days  2. Related Plans, Therapy Recommendations, or Therapy Problems to Be Addressed: None  3. Pharmacist to perform regular assessments no more than (6) months from the previous assessment.  4. Care Coordinator to set up future refill outreaches, coordinate prescription delivery, and escalate clinical questions to pharmacist.  5. Welcome information and patient satisfaction survey to be sent by specialty pharmacy team with patient's initial fill.    Attestation  Therapeutic appropriateness: Appropriate   I attest the patient was actively involved in and has agreed to the above plan of care. If the prescribed therapy is at any point deemed not appropriate based on the current or future assessments, a consultation will be initiated with the patient's specialty care provider to determine the best course of action. The revised plan of therapy will be documented along with any required assessments and/or additional patient education provided.     Kerry Wright,  PharmD, BCPS  Clinical Specialty Pharmacist, Cardiology  8/1/2024  14:06 EDT

## 2024-08-01 NOTE — TELEPHONE ENCOUNTER
"    Caller: Alex Parr \"Bill\"    Relationship: Self    Best call back number: [phone]    Requested Prescriptions:   Requested Prescriptions     Pending Prescriptions Disp Refills    Evolocumab (REPATHA) solution auto-injector SureClick injection 1 mL 0     Sig: Inject 1 mL under the skin into the appropriate area as directed Every 14 (Fourteen) Days.        Pharmacy where request should be sent:      Last office visit with prescribing clinician: 9/25/2023   Last telemedicine visit with prescribing clinician: Visit date not found   Next office visit with prescribing clinician: 6/23/2025     Additional details provided by patient: PATIENT IS OUT AND DUE FOR HIS NEXT INJECTION ON 08.03.24. HE RECENTLY CHANGED INSURANCE COMPANIES AND THE PHARMACY SAID HIS APPROVAL NEEDED UPDATED. PLEASE REACH OUT TO THE PATIENT TO LET HIM KNOW WHAT TO DO.    Does the patient have less than a 3 day supply:  [x] Yes  [] No    Would you like a call back once the refill request has been completed: [x] Yes [] No    If the office needs to give you a call back, can they leave a voicemail: [x] Yes [] No    Scott Campuzano Rep   08/01/24 12:34 EDT           "

## 2024-08-14 RX ORDER — ASPIRIN 81 MG/1
81 TABLET, COATED ORAL DAILY
Qty: 90 TABLET | Refills: 0 | Status: SHIPPED | OUTPATIENT
Start: 2024-08-14

## 2024-08-22 RX ORDER — LISINOPRIL AND HYDROCHLOROTHIAZIDE 20; 12.5 MG/1; MG/1
1 TABLET ORAL DAILY
Qty: 90 TABLET | Refills: 0 | Status: SHIPPED | OUTPATIENT
Start: 2024-08-22

## 2024-08-23 ENCOUNTER — SPECIALTY PHARMACY (OUTPATIENT)
Dept: CARDIOLOGY | Facility: CLINIC | Age: 64
End: 2024-08-23
Payer: COMMERCIAL

## 2024-08-23 NOTE — PROGRESS NOTES
"Specialty Pharmacy Refill Coordination Note     Alex \"Bill\" is a 64 y.o. male contacted today regarding refills of Repatha 140 mg SC every 14 days specialty medication(s).    Shipping Monday for delivery on Tuesday, 8/27/24.    Specialty medication(s) and dose(s) confirmed: yes    Refill Questions      Flowsheet Row Most Recent Value   Changes to allergies? No   Changes to medications? No   New conditions or infections since last clinic visit No   Unplanned office visit, urgent care, ED, or hospital admission in the last 4 weeks  No   How does patient/caregiver feel medication is working? Good   Financial problems or insurance changes  No   Since the previous refill, were any specialty medication doses or scheduled injections missed or delayed?  No   Does this patient require a clinical escalation to a pharmacist? No            Delivery Questions      Flowsheet Row Most Recent Value   Delivery method [employer]   Delivery address verified with patient/caregiver? Yes   Delivery address Home   Number of medications in delivery 1   Medication(s) being filled and delivered Evolocumab   Doses left of specialty medications 0 next due 9/7/24   Copay verified? Yes   Copay amount $5   Copay form of payment Credit/debit on file   Ship Date 8/26/24   Delivery Date 8/27/24   Signature Required No                   Follow-up: 28 day(s)     Jessica Nava, Pharmacy Technician  Specialty Pharmacy Technician        "

## 2024-09-19 ENCOUNTER — SPECIALTY PHARMACY (OUTPATIENT)
Dept: CARDIOLOGY | Facility: CLINIC | Age: 64
End: 2024-09-19
Payer: COMMERCIAL

## 2024-10-16 ENCOUNTER — SPECIALTY PHARMACY (OUTPATIENT)
Dept: CARDIOLOGY | Facility: CLINIC | Age: 64
End: 2024-10-16
Payer: COMMERCIAL

## 2024-10-16 NOTE — PROGRESS NOTES
"Specialty Pharmacy Refill Coordination Note     Alex \"Bill\" is a 64 y.o. male contacted today regarding refills of Repatha 140 mg SC every 14 days specialty medication(s).    Delivery scheduled for tomorrow, patient aware. OK to use credit card on file.    Specialty medication(s) and dose(s) confirmed: yes    Refill Questions      Flowsheet Row Most Recent Value   Changes to allergies? No   Changes to medications? No   New conditions or infections since last clinic visit No   Unplanned office visit, urgent care, ED, or hospital admission in the last 4 weeks  No   How does patient/caregiver feel medication is working? Very good   Financial problems or insurance changes  No   Since the previous refill, were any specialty medication doses or scheduled injections missed or delayed?  No   Does this patient require a clinical escalation to a pharmacist? No            Delivery Questions      Flowsheet Row Most Recent Value   Delivery method UPS   Delivery address verified with patient/caregiver? Yes   Delivery address Home   Number of medications in delivery 1   Medication(s) being filled and delivered Evolocumab (REPATHA)   Doses left of specialty medications 1 pen   Copay verified? Yes   Copay amount 5.00   Copay form of payment Credit/debit on file   Ship Date 10/16/2024   Delivery Date 10/17/2024   Signature Required No                   Follow-up: 28 day(s)     Jessica Nava, Pharmacy Technician  Specialty Pharmacy Technician        "

## 2024-10-28 ENCOUNTER — OFFICE VISIT (OUTPATIENT)
Dept: FAMILY MEDICINE CLINIC | Facility: CLINIC | Age: 64
End: 2024-10-28
Payer: COMMERCIAL

## 2024-10-28 ENCOUNTER — LAB (OUTPATIENT)
Dept: LAB | Facility: HOSPITAL | Age: 64
End: 2024-10-28
Payer: COMMERCIAL

## 2024-10-28 VITALS
HEART RATE: 88 BPM | OXYGEN SATURATION: 97 % | HEIGHT: 72 IN | DIASTOLIC BLOOD PRESSURE: 82 MMHG | WEIGHT: 200.6 LBS | BODY MASS INDEX: 27.17 KG/M2 | SYSTOLIC BLOOD PRESSURE: 138 MMHG

## 2024-10-28 DIAGNOSIS — G89.29 CHRONIC LEFT SHOULDER PAIN: ICD-10-CM

## 2024-10-28 DIAGNOSIS — E78.5 HYPERLIPIDEMIA, UNSPECIFIED HYPERLIPIDEMIA TYPE: ICD-10-CM

## 2024-10-28 DIAGNOSIS — I10 ESSENTIAL HYPERTENSION: Primary | ICD-10-CM

## 2024-10-28 DIAGNOSIS — M25.512 CHRONIC LEFT SHOULDER PAIN: ICD-10-CM

## 2024-10-28 DIAGNOSIS — I73.9 PAD (PERIPHERAL ARTERY DISEASE): ICD-10-CM

## 2024-10-28 LAB
ALBUMIN SERPL-MCNC: 4.6 G/DL (ref 3.5–5.2)
ALBUMIN/GLOB SERPL: 1.5 G/DL
ALP SERPL-CCNC: 89 U/L (ref 39–117)
ALT SERPL W P-5'-P-CCNC: 14 U/L (ref 1–41)
ANION GAP SERPL CALCULATED.3IONS-SCNC: 13 MMOL/L (ref 5–15)
AST SERPL-CCNC: 20 U/L (ref 1–40)
BILIRUB SERPL-MCNC: 0.6 MG/DL (ref 0–1.2)
BUN SERPL-MCNC: 18 MG/DL (ref 8–23)
BUN/CREAT SERPL: 17.5 (ref 7–25)
CALCIUM SPEC-SCNC: 9.8 MG/DL (ref 8.6–10.5)
CHLORIDE SERPL-SCNC: 103 MMOL/L (ref 98–107)
CHOLEST SERPL-MCNC: 134 MG/DL (ref 0–200)
CO2 SERPL-SCNC: 23 MMOL/L (ref 22–29)
CREAT SERPL-MCNC: 1.03 MG/DL (ref 0.76–1.27)
EGFRCR SERPLBLD CKD-EPI 2021: 81.1 ML/MIN/1.73
GLOBULIN UR ELPH-MCNC: 3.1 GM/DL
GLUCOSE SERPL-MCNC: 101 MG/DL (ref 65–99)
HDLC SERPL-MCNC: 52 MG/DL (ref 40–60)
LDLC SERPL CALC-MCNC: 61 MG/DL (ref 0–100)
LDLC/HDLC SERPL: 1.13 {RATIO}
POTASSIUM SERPL-SCNC: 4.5 MMOL/L (ref 3.5–5.2)
PROT SERPL-MCNC: 7.7 G/DL (ref 6–8.5)
SODIUM SERPL-SCNC: 139 MMOL/L (ref 136–145)
TRIGL SERPL-MCNC: 116 MG/DL (ref 0–150)
VLDLC SERPL-MCNC: 21 MG/DL (ref 5–40)

## 2024-10-28 PROCEDURE — 99214 OFFICE O/P EST MOD 30 MIN: CPT | Performed by: PHYSICIAN ASSISTANT

## 2024-10-28 PROCEDURE — 80061 LIPID PANEL: CPT

## 2024-10-28 PROCEDURE — 80053 COMPREHEN METABOLIC PANEL: CPT

## 2024-10-28 NOTE — PROGRESS NOTES
Chief Complaint   Patient presents with    Hypertension       HPI     Alex Parr is a 64 y.o. male with a history of HTN, HLD, left common iliac artery stenosis s/p stent in 6/2023, left subclavian stenosis s/p ballon angioplasty and stent in 7/2023, prediabetes, gout, and former tobacco use who is here for 6 month follow-up of hypertension.     He had myalgias on higher dosage rosuvastatin. Now on repatha x 6 months which he has tolerated well. Clopidogrel was also discontinued by cardiology. He is now taking aspirin only. He denies recurrent left leg claudication, weakness. Sometimes he has left shoulder pain when he sweeps the floor a lot. He feels this is more musculoskeletal. He denies associated left arm pain.   Has not checked home blood pressures recently. Compliant on amlodipine, and lisinopril-hydrochlorothiazide.     Past Medical History:   Diagnosis Date    Gout     Hyperlipidemia     Hypertension        Past Surgical History:   Procedure Laterality Date    CARDIAC CATHETERIZATION N/A 6/20/2023    Procedure: Peripheral angiography;  Surgeon: Brandon Guo MD;  Location:  [facility];  Service: Cardiovascular;  Laterality: N/A;    CARDIAC CATHETERIZATION N/A 7/20/2023    Procedure: Angioplasty-peripheral;  Surgeon: Brandon Guo MD;  Location:  [facility];  Service: Cardiovascular;  Laterality: N/A;  Left subclavian intervention. No industry rep needed. Schedule in ~2-4 weeks    INGUINAL HERNIA REPAIR  2001       Family History   Problem Relation Age of Onset    No Known Problems Mother     Stroke Father     Parkinsonism Father 80    Hypertension Father     ALS Maternal Grandmother     No Known Problems Maternal Grandfather     No Known Problems Paternal Grandmother     No Known Problems Paternal Grandfather        Social History     Socioeconomic History    Marital status:    Tobacco Use    Smoking status: Former     Current packs/day: 0.00     Average  packs/day: 0.5 packs/day for 40.1 years (20.0 ttl pk-yrs)     Types: Cigarettes     Start date:      Quit date: 2020     Years since quittin.7    Smokeless tobacco: Never   Vaping Use    Vaping status: Never Used   Substance and Sexual Activity    Alcohol use: Yes     Alcohol/week: 14.0 standard drinks of alcohol     Types: 14 Glasses of wine per week     Comment: 2 glasses of wine/night    Drug use: No    Sexual activity: Defer       No Known Allergies    ROS    Review of Systems   Eyes:  Negative for blurred vision.   Respiratory:  Negative for shortness of breath.    Cardiovascular:  Negative for chest pain.   Musculoskeletal:  Positive for arthralgias.   Neurological:  Negative for dizziness and headache.       Vitals:    10/28/24 1002   BP: 138/82   Pulse: 88   SpO2: 97%     Body mass index is 27.2 kg/m².      Current Outpatient Medications:     amLODIPine (NORVASC) 5 MG tablet, Take 1 tablet by mouth Every Night., Disp: 90 tablet, Rfl: 2    aspirin (Aspirin Low Dose) 81 MG EC tablet, TAKE 1 TABLET BY MOUTH DAILY, Disp: 90 tablet, Rfl: 0    cholecalciferol (VITAMIN D3) 25 MCG (1000 UT) tablet, Take 1 tablet by mouth Daily., Disp: , Rfl:     Evolocumab (REPATHA) solution auto-injector SureClick injection, Inject 1 mL under the skin into the appropriate area as directed Every 14 (Fourteen) Days., Disp: 2 mL, Rfl: 11    famotidine (PEPCID) 20 MG tablet, TAKE ONE TABLET BY MOUTH ONCE NIGHTLY, Disp: 90 tablet, Rfl: 3    lisinopril-hydrochlorothiazide (PRINZIDE,ZESTORETIC) 20-12.5 MG per tablet, TAKE 1 TABLET BY MOUTH DAILY, Disp: 90 tablet, Rfl: 0    Magnesium 300 MG capsule, Take 300 mg by mouth Daily., Disp: , Rfl:     vitamin B-12 (CYANOCOBALAMIN) 1000 MCG tablet, Take 1 tablet by mouth Daily., Disp: , Rfl:     vitamin C (ASCORBIC ACID) 250 MG tablet, Take 1 tablet by mouth Daily. Takes 1 chewable tablet, Disp: , Rfl:     PE    Physical Exam  Vitals reviewed.   Constitutional:       General: He is  not in acute distress.     Appearance: He is well-developed.   HENT:      Head: Normocephalic and atraumatic.   Eyes:      Conjunctiva/sclera: Conjunctivae normal.   Cardiovascular:      Rate and Rhythm: Normal rate and regular rhythm.      Pulses:           Radial pulses are 2+ on the right side and 1+ on the left side.        Posterior tibial pulses are 2+ on the right side and 2+ on the left side.      Heart sounds: Normal heart sounds. No murmur heard.  Pulmonary:      Effort: Pulmonary effort is normal.      Breath sounds: Normal breath sounds.   Musculoskeletal:      Right shoulder: Normal pulse.      Left shoulder: No tenderness. Normal range of motion. Normal strength. Abnormal pulse.      Cervical back: Normal range of motion.      Comments: Patient has pain with empty can test on left. No weakness.    Skin:     General: Skin is warm and dry.   Neurological:      Mental Status: He is alert.      Gait: Gait normal.   Psychiatric:         Speech: Speech normal.         Behavior: Behavior normal.         Results    Results for orders placed or performed in visit on 04/29/24   Lipid Panel    Collection Time: 04/29/24 11:38 AM    Specimen: Blood   Result Value Ref Range    Total Cholesterol 180 0 - 200 mg/dL    Triglycerides 101 0 - 150 mg/dL    HDL Cholesterol 51 40 - 60 mg/dL    LDL Cholesterol  111 (H) 0 - 100 mg/dL    VLDL Cholesterol 18 5 - 40 mg/dL    LDL/HDL Ratio 2.13    CBC (No Diff)    Collection Time: 04/29/24 11:38 AM    Specimen: Blood   Result Value Ref Range    WBC 9.41 3.40 - 10.80 10*3/mm3    RBC 4.81 4.14 - 5.80 10*6/mm3    Hemoglobin 14.6 13.0 - 17.7 g/dL    Hematocrit 43.4 37.5 - 51.0 %    MCV 90.2 79.0 - 97.0 fL    MCH 30.4 26.6 - 33.0 pg    MCHC 33.6 31.5 - 35.7 g/dL    RDW 11.8 (L) 12.3 - 15.4 %    RDW-SD 38.5 37.0 - 54.0 fl    MPV 10.3 6.0 - 12.0 fL    Platelets 259 140 - 450 10*3/mm3   Comprehensive Metabolic Panel    Collection Time: 04/29/24 11:38 AM    Specimen: Blood   Result Value  Ref Range    Glucose 104 (H) 65 - 99 mg/dL    BUN 15 8 - 23 mg/dL    Creatinine 1.02 0.76 - 1.27 mg/dL    Sodium 135 (L) 136 - 145 mmol/L    Potassium 4.6 3.5 - 5.2 mmol/L    Chloride 100 98 - 107 mmol/L    CO2 22.0 22.0 - 29.0 mmol/L    Calcium 9.7 8.6 - 10.5 mg/dL    Total Protein 7.3 6.0 - 8.5 g/dL    Albumin 4.5 3.5 - 5.2 g/dL    ALT (SGPT) 11 1 - 41 U/L    AST (SGOT) 22 1 - 40 U/L    Alkaline Phosphatase 85 39 - 117 U/L    Total Bilirubin 0.7 0.0 - 1.2 mg/dL    Globulin 2.8 gm/dL    A/G Ratio 1.6 g/dL    BUN/Creatinine Ratio 14.7 7.0 - 25.0    Anion Gap 13.0 5.0 - 15.0 mmol/L    eGFR 82.1 >60.0 mL/min/1.73   TSH Rfx On Abnormal To Free T4    Collection Time: 04/29/24 11:38 AM    Specimen: Blood   Result Value Ref Range    TSH 1.200 0.270 - 4.200 uIU/mL   Urinalysis With Culture If Indicated - Urine, Clean Catch    Collection Time: 04/29/24 11:38 AM    Specimen: Urine, Clean Catch   Result Value Ref Range    Color, UA Yellow Yellow, Straw    Appearance, UA Clear Clear    pH, UA 6.5 5.0 - 8.0    Specific Gravity, UA 1.011 1.005 - 1.030    Glucose, UA Negative Negative    Ketones, UA Negative Negative    Bilirubin, UA Negative Negative    Blood, UA Negative Negative    Protein, UA Negative Negative    Leuk Esterase, UA Small (1+) (A) Negative    Nitrite, UA Negative Negative    Urobilinogen, UA 0.2 E.U./dL 0.2 - 1.0 E.U./dL   Vitamin D,25-Hydroxy    Collection Time: 04/29/24 11:38 AM    Specimen: Blood   Result Value Ref Range    25 Hydroxy, Vitamin D 45.1 30.0 - 100.0 ng/ml   Uric Acid    Collection Time: 04/29/24 11:38 AM    Specimen: Blood   Result Value Ref Range    Uric Acid 7.4 (H) 3.4 - 7.0 mg/dL   Hemoglobin A1c    Collection Time: 04/29/24 11:38 AM    Specimen: Blood   Result Value Ref Range    Hemoglobin A1C 5.70 (H) 4.80 - 5.60 %   [place] Urine Culture Tube - Urine, Clean Catch    Collection Time: 04/29/24 11:38 AM    Specimen: Urine, Clean Catch   Result Value Ref Range    Extra Tube Hold for add-ons.     Urinalysis, Microscopic Only - Urine, Clean Catch    Collection Time: 04/29/24 11:38 AM    Specimen: Urine, Clean Catch   Result Value Ref Range    RBC, UA 0-2 None Seen, 0-2 /HPF    WBC, UA 0-2 None Seen, 0-2 /HPF    Bacteria, UA None Seen None Seen /HPF    Squamous Epithelial Cells, UA 0-2 None Seen, 0-2 /HPF    Hyaline Casts, UA None Seen None Seen /LPF    Methodology Automated Microscopy        A/P    Problem List Items Addressed This Visit          Cardiac and Vasculature    PAD (peripheral artery disease)    Overview     5/2023 BLE duplex: Right ENEIDA normal. The left ENEIDA is moderately reduced. Waveforms are consistent with aorto-iliac disease. Abnormal  digital pressures.  6/20/2023: 90% heavily calcified left common iliac artery stenosis status post lithotripsy and stenting with a Viabahn VBX 8 x 39 mm covered stent. 70% calcified small left subclavian artery stenosis           Hyperlipidemia    Relevant Orders    Comprehensive Metabolic Panel (Completed)    Lipid Panel (Completed)    Essential hypertension - Primary     Other Visit Diagnoses       Chronic left shoulder pain              -Hypertension is controlled. Continue present management.   -Suspect RTC syndrome left shoulder. Patient declines treatment at this time due to mild symptoms. He has a palpable but reduced left radial pulse but denies left arm claudication. He was counseled to present to the ER if he develops resting arm pain or changes in color or temperature. Continue Repatha, aspirin per cardiology's recommendation.  -Check CMP, lipids today.   -RTC in 6 months for an annual physical, sooner prn.       Plan of care was reviewed with patient at the conclusion of today's visit. Education was provided regarding diagnoses, management, and the importance of keeping follow-up appointments. The patient was counseled regarding the risks, benefits, and possible side-effects of treatment. Patient and/or family express understanding and agreement  with the management plan.        Zaid Pena PA-C

## 2024-10-31 ENCOUNTER — OFFICE VISIT (OUTPATIENT)
Dept: ORTHOPEDIC SURGERY | Facility: CLINIC | Age: 64
End: 2024-10-31
Payer: COMMERCIAL

## 2024-10-31 VITALS
WEIGHT: 200.62 LBS | DIASTOLIC BLOOD PRESSURE: 88 MMHG | HEIGHT: 72 IN | SYSTOLIC BLOOD PRESSURE: 170 MMHG | BODY MASS INDEX: 27.17 KG/M2

## 2024-10-31 DIAGNOSIS — M17.0 PRIMARY OSTEOARTHRITIS OF BOTH KNEES: Primary | ICD-10-CM

## 2024-10-31 RX ORDER — TRIAMCINOLONE ACETONIDE 40 MG/ML
40 INJECTION, SUSPENSION INTRA-ARTICULAR; INTRAMUSCULAR
Status: COMPLETED | OUTPATIENT
Start: 2024-10-31 | End: 2024-10-31

## 2024-10-31 RX ORDER — LIDOCAINE HYDROCHLORIDE 10 MG/ML
3 INJECTION, SOLUTION EPIDURAL; INFILTRATION; INTRACAUDAL; PERINEURAL
Status: COMPLETED | OUTPATIENT
Start: 2024-10-31 | End: 2024-10-31

## 2024-10-31 RX ADMIN — TRIAMCINOLONE ACETONIDE 40 MG: 40 INJECTION, SUSPENSION INTRA-ARTICULAR; INTRAMUSCULAR at 13:41

## 2024-10-31 RX ADMIN — LIDOCAINE HYDROCHLORIDE 3 ML: 10 INJECTION, SOLUTION EPIDURAL; INFILTRATION; INTRACAUDAL; PERINEURAL at 13:41

## 2024-10-31 NOTE — PROGRESS NOTES
Procedure   - Large Joint Arthrocentesis: bilateral knee on 10/31/2024 1:41 PM  Indications: pain  Details: (23g) needle, anterolateral approach  Medications (Right): 3 mL lidocaine PF 1% 1 %; 40 mg triamcinolone acetonide 40 MG/ML  Medications (Left): 3 mL lidocaine PF 1% 1 %; 40 mg triamcinolone acetonide 40 MG/ML  Outcome: tolerated well, no immediate complications  Procedure, treatment alternatives, risks and benefits explained, specific risks discussed. Consent was given by the patient. Immediately prior to procedure a time out was called to verify the correct patient, procedure, equipment, support staff and site/side marked as required. Patient was prepped and draped in the usual sterile fashion.

## 2024-10-31 NOTE — PROGRESS NOTES
"    [facility] Orthopedic Surgery Clinic Note        Subjective     CC: Follow-up (11 months follow up- Bilateral knee pain.)      HPI    Alex Parr is a 64 y.o. male.  Patient is here today for follow-up of his bilateral knee pain.  Last injected on 2023 and this is lasted almost a year.  Went to the beach with his family and walked on sand and this seems to have exacerbated his underlying knee pain.  Hurts medially.    Overall, patient's symptoms are as above    ROS:    Constiutional:Pt denies fever, chills, nausea, or vomiting.  MSK:as above        Objective      Past Medical History  Past Medical History:   Diagnosis Date    Gout     Hyperlipidemia     Hypertension      Social History     Socioeconomic History    Marital status:    Tobacco Use    Smoking status: Former     Current packs/day: 0.00     Average packs/day: 0.5 packs/day for 40.1 years (20.0 ttl pk-yrs)     Types: Cigarettes     Start date:      Quit date: 2020     Years since quittin.7    Smokeless tobacco: Never   Vaping Use    Vaping status: Never Used   Substance and Sexual Activity    Alcohol use: Yes     Alcohol/week: 14.0 standard drinks of alcohol     Types: 14 Glasses of wine per week     Comment: 2 glasses of wine/night    Drug use: No    Sexual activity: Defer          Physical Exam  /88   Ht 182.9 cm (72.01\")   Wt 91 kg (200 lb 9.9 oz)   BMI 27.20 kg/m²     Body mass index is 27.2 kg/m².    Patient is well nourished and well developed.        Ortho Exam  Peripheral Vascular:    Upper Extremity:   Inspection:  Left--no cyanotic nail beds Right--no cyanotic nail beds   Bilateral:  Pink nail beds with brisk capillary refill   Palpation:  Bilateral radial pulse normal    Musculoskeletal:  Global Assessment:  Overall assessment of Lower Extremity Muscle Strength and Tone:  Left quadriceps--5/5   Left hamstrings--5/5       Left tibialis anterior--5/5  Left gastroc-soleus--5/5  Left EHL--5/5    Right " quadriceps--5/5  Right hamstrings--5/5  Right tibialis anterior--5/5  Right gastroc soleus--5/5  Right EHL--5/5    Lower Extremity:  Knee/Patella:  No digital clubbing or cyanosis.    Examination of left and right knees reveals:  Normal deep tendon reflexes, coordination, strength, tone, sensation.  No known fractures or deformities.    Inspection and Palpation:    Left knee:  Tenderness:  Over the medial joint line and moderate severity  Effusion:  1+  Crepitus:  Positive  Pulses:  2+  Ecchymosis:  None  Warmth:  None     Right knee:  Tenderness:  Over the medial joint line and moderate severity  Effusion:  1+  Crepitus:  Positive  Pulses:  2+  Ecchymosis:  None  Warmth:  None     ROM:  Right:  Extension:5    Flexion:120  Left:  Extension:5     Flexion:120    Instability:    Left:  Lachman Test:  Negative, Varus stress test negative, Valgus stress test negative  Right:  Lachman Test:  Negative, Varus stress test negative, Valgus stress test negative    Deformities/Malalignments/Discrepancies:    Left:  Genu Varum   Right:  Genu Varum    Functional Testing:  Right:  Nata's test:  Negative  Patella grind test:  Positive  Q-angle:  Normal    Left:  Nata's test:  Negative  Patella grind test:  Positive  Q-angle:  normal      Imaging/Labs/EMG Reviewed and Interpreted:  Imaging Results (Last 24 Hours)       Procedure Component Value Units Date/Time    XR Knee 4+ View Bilateral [[id number]] Resulted: 10/31/24 1531     Updated: 10/31/24 1531    Narrative:      Knee X-Ray    Indication: Pain    Study:  Upright AP, Skiers, Lateral, and Sunrise views of Bilateral   knee(s)    Comparison: Bilateral knee 11/28/2023    Findings:    Patient appears to have moderate to early severe degenerative changes in   the medial compartment.  Radiographic changes are worse in the left knee   than right knee  There are mild degenerative changes in the lateral compartment.    There are moderate changes in the patellofemoral compartment.     Patient has overall varus alignment.    Kellgren-Curt thGthrthathdtheth:th th4th Impression:   Moderate to early severe medial compartment and moderate patellofemoral   compartment degnerative changes of the right and left knee               Assessment    Assessment:  1. Primary osteoarthritis of both knees        Plan:  Recommend over the counter anti-inflammatories for pain and/or swelling  Bilateral knee arthritis--repeat injections will be given today.  Patient has yet to have gel but is getting great relief from steroid so we will stick with this for now.      Procedure Note:    I discussed with the patient the potential benefits of performing a therapeutic injections as well as potential risks including but not limited to infection, swelling, pain, bleeding, bruising, nerve/vessel damage, skin color changes, transient elevation in blood glucose levels, and fat atrophy. After informed consent and after the areas were prepped with chlorhexadine soap, ethyl chloride was used to numb the skin. Via the anterolateral approach, 3mL of 1% lidocaine followed by 40mg of Kenalog were each injected into the right and left knee joint. The patient tolerated the procedure well. There were no complications. A sterile dressing was placed over the injection sites.        Jameson Levy MD  10/31/24  15:56 EDT      Dictated Utilizing Dragon Dictation.

## 2024-11-08 RX ORDER — ASPIRIN 81 MG/1
81 TABLET, COATED ORAL DAILY
Qty: 90 TABLET | Refills: 0 | Status: SHIPPED | OUTPATIENT
Start: 2024-11-08

## 2024-11-08 NOTE — TELEPHONE ENCOUNTER
Rx Refill Note  Requested Prescriptions     Pending Prescriptions Disp Refills    Aspirin Low Dose 81 MG EC tablet [Pharmacy Med Name: ASPIRIN EC 81 MG TABLET] 90 tablet 0     Sig: TAKE 1 TABLET BY MOUTH DAILY      Last office visit with prescribing clinician: 10/28/2024   Last telemedicine visit with prescribing clinician: Visit date not found   Next office visit with prescribing clinician: 4/28/2025       Caridad Day MA  11/08/24, 07:39 EST

## 2024-11-11 ENCOUNTER — SPECIALTY PHARMACY (OUTPATIENT)
Dept: CARDIOLOGY | Facility: CLINIC | Age: 64
End: 2024-11-11
Payer: COMMERCIAL

## 2024-11-11 NOTE — PROGRESS NOTES
"Specialty Pharmacy Refill Coordination Note     Alex \"Bill\" is a 64 y.o. male contacted today regarding refills of Repatha 140 mg SC every 14 days specialty medication(s).    Delivery scheduled for tomorrow, 11/12/24.    Specialty medication(s) and dose(s) confirmed: yes    Refill Questions      Flowsheet Row Most Recent Value   Changes to allergies? No   Changes to medications? No   New conditions or infections since last clinic visit No   Unplanned office visit, urgent care, ED, or hospital admission in the last 4 weeks  No   How does patient/caregiver feel medication is working? Very good   Financial problems or insurance changes  No   Since the previous refill, were any specialty medication doses or scheduled injections missed or delayed?  No   Does this patient require a clinical escalation to a pharmacist? No            Delivery Questions      Flowsheet Row Most Recent Value   Delivery method UPS   Delivery address verified with patient/caregiver? Yes   Delivery address Home   Number of medications in delivery 1   Medication(s) being filled and delivered Evolocumab (REPATHA)   Doses left of specialty medications 0   Copay verified? Yes   Copay amount 5.00   Copay form of payment Credit/debit on file   Ship Date 11/11/24   Delivery Date 11/12/24   Signature Required No                   Follow-up: 28 day(s)     Jessica Nava, Pharmacy Technician  Specialty Pharmacy Technician        "

## 2024-11-19 RX ORDER — LISINOPRIL AND HYDROCHLOROTHIAZIDE 12.5; 2 MG/1; MG/1
1 TABLET ORAL DAILY
Qty: 90 TABLET | Refills: 0 | Status: SHIPPED | OUTPATIENT
Start: 2024-11-19

## 2024-12-09 ENCOUNTER — SPECIALTY PHARMACY (OUTPATIENT)
Dept: CARDIOLOGY | Facility: CLINIC | Age: 64
End: 2024-12-09
Payer: COMMERCIAL

## 2024-12-09 NOTE — PROGRESS NOTES
"Specialty Pharmacy Refill Coordination Note     Alex \"Bill\" is a 64 y.o. male contacted today regarding refills of Repatha 140 mg SC every 14 days specialty medication(s).    Delivery scheduled for tomorrow.    Specialty medication(s) and dose(s) confirmed: yes    Refill Questions      Flowsheet Row Most Recent Value   Changes to allergies? No   Changes to medications? No   New conditions or infections since last clinic visit No   Unplanned office visit, urgent care, ED, or hospital admission in the last 4 weeks  No   How does patient/caregiver feel medication is working? Very good   Financial problems or insurance changes  No   Since the previous refill, were any specialty medication doses or scheduled injections missed or delayed?  No   Does this patient require a clinical escalation to a pharmacist? No            Delivery Questions      Flowsheet Row Most Recent Value   Delivery method UPS   Delivery address verified with patient/caregiver? Yes   Delivery address Home   Number of medications in delivery 1   Medication(s) being filled and delivered Evolocumab (REPATHA)   Doses left of specialty medications 0 last dose 12/7/24   Copay verified? Yes   Copay amount 5.00   Copay form of payment Credit/debit on file   Ship Date 12/9/24   Delivery Date 12/10/24   Signature Required No                   Follow-up: 28 day(s)     Jessica Nava, Pharmacy Technician  Specialty Pharmacy Technician        "

## 2024-12-30 RX ORDER — FAMOTIDINE 20 MG/1
20 TABLET, FILM COATED ORAL NIGHTLY
Qty: 90 TABLET | Refills: 0 | Status: SHIPPED | OUTPATIENT
Start: 2024-12-30

## 2025-01-02 ENCOUNTER — SPECIALTY PHARMACY (OUTPATIENT)
Dept: CARDIOLOGY | Facility: CLINIC | Age: 65
End: 2025-01-02
Payer: COMMERCIAL

## 2025-01-02 NOTE — PROGRESS NOTES
"Specialty Pharmacy Refill Coordination Note     Alex \"Bill\" is a 64 y.o. male contacted today regarding refills of Repatha 140 mg SC every 14 days specialty medication(s).    Delivery scheduled for Saturday. Patient aware of copay increase.    Specialty medication(s) and dose(s) confirmed: yes    Refill Questions      Flowsheet Row Most Recent Value   Changes to allergies? No   Changes to medications? No   New conditions or infections since last clinic visit No   Unplanned office visit, urgent care, ED, or hospital admission in the last 4 weeks  No   How does patient/caregiver feel medication is working? Very good   Financial problems or insurance changes  No   Since the previous refill, were any specialty medication doses or scheduled injections missed or delayed?  No   Does this patient require a clinical escalation to a pharmacist? No            Delivery Questions      Flowsheet Row Most Recent Value   Delivery method UPS   Delivery address verified with patient/caregiver? Yes   Delivery address Home   Number of medications in delivery 1   Medication(s) being filled and delivered Evolocumab (REPATHA)   Doses left of specialty medications 1 pen due 1/4/25   Copay verified? Yes   Copay amount 15.00   Copay form of payment Credit/debit on file   Ship Date 1/3/25   Delivery Date 1/4/25   Signature Required No                   Follow-up: 28 day(s)     Jessica Nava, Pharmacy Technician  Specialty Pharmacy Technician        "

## 2025-01-24 ENCOUNTER — SPECIALTY PHARMACY (OUTPATIENT)
Dept: CARDIOLOGY | Facility: CLINIC | Age: 65
End: 2025-01-24
Payer: COMMERCIAL

## 2025-01-24 NOTE — PROGRESS NOTES
Specialty Pharmacy Patient Management Program  Cardiology Reassessment     Alex Parr was referred by a Cardiology provider to the Cardiology Patient Management program offered by [facility] for Hyperlipidemia. A follow-up outreach was conducted, including assessment of continued therapy appropriateness, medication adherence, and side effect incidence and management for Repatha.    Changes to Insurance Coverage or Financial Support  No changes    Relevant Past Medical History and Comorbidities  Relevant medical history and concomitant health conditions were discussed with the patient. The patient's chart has been reviewed for relevant past medical history and comorbid health conditions and updated as necessary.   Past Medical History:   Diagnosis Date    Gout     Hyperlipidemia     Hypertension      Social History     Socioeconomic History    Marital status:    Tobacco Use    Smoking status: Former     Current packs/day: 0.00     Average packs/day: 0.5 packs/day for 40.1 years (20.0 ttl pk-yrs)     Types: Cigarettes     Start date:      Quit date: 2020     Years since quittin.0    Smokeless tobacco: Never   Vaping Use    Vaping status: Never Used   Substance and Sexual Activity    Alcohol use: Yes     Alcohol/week: 14.0 standard drinks of alcohol     Types: 14 Glasses of wine per week     Comment: 2 glasses of wine/night    Drug use: No    Sexual activity: Defer     Problem list reviewed by Kerry Wright, PharmD on 2025 at 11:00 AM    Hospitalizations and Urgent Care Since Last Assessment  ED Visits, Admissions, or Hospitalizations: None  Urgent Office Visits: None    Allergies  Known allergies and reactions were discussed with the patient. The patient's chart has been reviewed for allergy information and updated as necessary.   No Known Allergies  Allergies reviewed by Kerry Wright, PharmD on 2025 at 10:59 AM    Relevant Laboratory Values  Relevant  laboratory values were discussed with the patient. The following specialty medication dose adjustment(s) are recommended: No changes    Lab Results   Component Value Date    GLUCOSE 101 (H) 10/28/2024    CALCIUM 9.8 10/28/2024     10/28/2024    K 4.5 10/28/2024    CO2 23.0 10/28/2024     10/28/2024    BUN 18 10/28/2024    CREATININE 1.03 10/28/2024    EGFRIFAFRI 92 02/22/2021    EGFRIFNONA 80 02/22/2021    BCR 17.5 10/28/2024    ANIONGAP 13.0 10/28/2024     Lab Results   Component Value Date    CHOL 134 10/28/2024    CHLPL 177 04/04/2022    TRIG 116 10/28/2024    HDL 52 10/28/2024    LDL 61 10/28/2024       Current Medication List  This medication list has been reviewed with the patient and evaluated for any interactions or necessary modifications/recommendations, and updated to include all prescription medications, OTC medications, and supplements the patient is currently taking.  This list reflects what is contained in the patient's profile, which has also been marked as reviewed to communicate to other providers it is the most up to date version of the patient's current medication therapy.     Current Outpatient Medications:     amLODIPine (NORVASC) 5 MG tablet, Take 1 tablet by mouth Every Night., Disp: 90 tablet, Rfl: 2    Aspirin Low Dose 81 MG EC tablet, TAKE 1 TABLET BY MOUTH DAILY, Disp: 90 tablet, Rfl: 0    cholecalciferol (VITAMIN D3) 25 MCG (1000 UT) tablet, Take 1 tablet by mouth Daily., Disp: , Rfl:     Evolocumab (REPATHA) solution auto-injector SureClick injection, Inject 1 mL under the skin into the appropriate area as directed Every 14 (Fourteen) Days., Disp: 2 mL, Rfl: 11    famotidine (PEPCID) 20 MG tablet, TAKE ONE TABLET BY MOUTH ONCE NIGHTLY, Disp: 90 tablet, Rfl: 0    lisinopril-hydrochlorothiazide (PRINZIDE,ZESTORETIC) 20-12.5 MG per tablet, TAKE 1 TABLET BY MOUTH DAILY, Disp: 90 tablet, Rfl: 0    Magnesium 300 MG capsule, Take 300 mg by mouth Daily., Disp: , Rfl:     vitamin  B-12 (CYANOCOBALAMIN) 1000 MCG tablet, Take 1 tablet by mouth Daily., Disp: , Rfl:     vitamin C (ASCORBIC ACID) 250 MG tablet, Take 1 tablet by mouth Daily. Takes 1 chewable tablet, Disp: , Rfl:     Medicines reviewed by Kerry Wright, PharmD on 1/24/2025 at 11:00 AM    Drug Interactions  None    Adverse Drug Reactions  Medication tolerability: Tolerating with no to minimal ADRs  Medication plan: Continue therapy with normal follow-up  Plan for ADR Management: N/A    Adherence, Self-Administration, and Current Therapy Problems  Adherence related to the patient's specialty therapy was discussed with the patient. The Adherence segment of this outreach has been reviewed and updated.     Adherence Questions  Linked Medication(s) Assessed: Evolocumab (REPATHA)  On average, how many doses/injections does the patient miss per month?: 0  What are the identified reasons for non-adherence or missed doses? : no problems identified  What is the estimated medication adherence level?: <70%  Based on the patient/caregiver response and refill history, does this patient require an MTP to track adherence improvements?: no (Low adherence was calculated using information from before [facility] enrolled patient. He had previously had trouble paying for prescription.)    Additional Barriers to Patient Self-Administration: N/A  Methods for Supporting Patient Self-Administration: N/A    Open Medication Therapy Problems  No medication therapy recommendations to display    Goals of Therapy  Goals related to the patient's specialty therapy were discussed with the patient. The Patient Goals segment of this outreach has been reviewed and updated.   Goals Addressed Today        Specialty Pharmacy General Goal      LDL Goal < 100 mg/dL    Lab Results   Component Value Date    LDL 61 10/28/2024     (H) 04/29/2024    LDL 45 07/20/2023    LDL 87 06/20/2023    LDL 95 04/24/2023 1/24/25: Last LDL on 10/28/24 down to 61 mg/dL; Continue  Repatha                 Quality of Life Assessment   Quality of Life related to the patient's enrollment in the patient management program and services provided was discussed with the patient. The QOL segment of this outreach has been reviewed and updated.  Quality of Life Improvement Scale: 8-Moderately better    Reassessment Plan & Follow-Up  1. Medication Therapy Changes: Continue Repatha 140mg subcutaneous every 14 days   2. Related Plans, Therapy Recommendations, or Issues to Be Addressed: None  3. Pharmacist to perform regular assessments no more than (6) months from the previous assessment.  4. Care Coordinator to set up future refill outreaches, coordinate prescription delivery, and escalate clinical questions to pharmacist.    Attestation  Therapeutic appropriateness: Appropriate   I attest the patient was actively involved in and has agreed to the above plan of care.  If the prescribed therapy is at any point deemed not appropriate based on the current or future assessments, a consultation will be initiated with the patient's specialty care provider to determine the best course of action. The revised plan of therapy will be documented along with any required assessments and/or additional patient education provided.     Kerry Wright, PharmD, [facility]  Clinical Specialty Pharmacist, Cardiology  1/24/2025  11:01 EST

## 2025-02-05 ENCOUNTER — SPECIALTY PHARMACY (OUTPATIENT)
Dept: CARDIOLOGY | Facility: CLINIC | Age: 65
End: 2025-02-05
Payer: COMMERCIAL

## 2025-02-05 NOTE — PROGRESS NOTES
" Specialty Pharmacy Patient Management Program  [facility]      Alex \"Bill\" is a 65 y.o. male contacted today regarding refills of his medication(s).    Specialty medication(s) and dose(s) confirmed: Repatha 140 mg SC every 14 days.    Other medications being refilled: n/a    Refill Questions      Flowsheet Row Most Recent Value   Changes to allergies? No   Changes to medications? No   New conditions or infections since last clinic visit No   Unplanned office visit, urgent care, ED, or hospital admission in the last 4 weeks  No   How does patient/caregiver feel medication is working? Very good   Financial problems or insurance changes  No   Since the previous refill, were any specialty medication doses or scheduled injections missed or delayed?  No   Does this patient require a clinical escalation to a pharmacist? No            Delivery Questions      Flowsheet Row Most Recent Value   Delivery method UPS   Delivery address verified with patient/caregiver? Yes   Delivery address Home   Number of medications in delivery 1   Medication(s) being filled and delivered Evolocumab (REPATHA)   Doses left of specialty medications 0   Copay verified? Yes   Copay amount 15.00   Copay form of payment Credit/debit on file   Ship Date 2/5/25   Delivery Date Selection 02/06/25   Signature Required No                   Follow-up: 28 days     Jessica Nava CPhT  Pharmacy Care Coordinator  2/5/2025  09:34 EST    "

## 2025-02-07 RX ORDER — AMLODIPINE BESYLATE 5 MG/1
5 TABLET ORAL NIGHTLY
Qty: 90 TABLET | Refills: 0 | Status: SHIPPED | OUTPATIENT
Start: 2025-02-07

## 2025-02-07 RX ORDER — ASPIRIN 81 MG/1
81 TABLET, COATED ORAL DAILY
Qty: 90 TABLET | Refills: 0 | Status: SHIPPED | OUTPATIENT
Start: 2025-02-07

## 2025-02-17 RX ORDER — LISINOPRIL AND HYDROCHLOROTHIAZIDE 12.5; 2 MG/1; MG/1
1 TABLET ORAL DAILY
Qty: 90 TABLET | Refills: 0 | Status: SHIPPED | OUTPATIENT
Start: 2025-02-17

## 2025-03-04 ENCOUNTER — SPECIALTY PHARMACY (OUTPATIENT)
Dept: CARDIOLOGY | Facility: CLINIC | Age: 65
End: 2025-03-04
Payer: COMMERCIAL

## 2025-03-04 NOTE — PROGRESS NOTES
"   Specialty Pharmacy Patient Management Program  Cardiology Specialty Pharmacy Refill Outreach      Alex \"Bill\" is a 65 y.o. male contacted today regarding refills of his medication(s).    Specialty medication(s) and dose(s) confirmed: Repatha 140 mg SC every 14 days.    Other medications being refilled: n/a    Refill Questions      Flowsheet Row Most Recent Value   Changes to allergies? No   Changes to medications? No   New conditions or infections since last clinic visit No   Unplanned office visit, urgent care, ED, or hospital admission in the last 4 weeks  No   How does patient/caregiver feel medication is working? Very good   Financial problems or insurance changes  No   Since the previous refill, were any specialty medication doses or scheduled injections missed or delayed?  No   Does this patient require a clinical escalation to a pharmacist? No            Delivery Questions      Flowsheet Row Most Recent Value   Delivery method UPS   Delivery address verified with patient/caregiver? Yes   Delivery address Home   Other address preferred n/a   Number of medications in delivery 1   Medication(s) being filled and delivered Evolocumab (REPATHA)   Doses left of specialty medications 0   Copay verified? Yes   Copay amount 15.00   Copay form of payment Credit/debit on file   Delivery Date Selection 03/05/25   Signature Required No                   Follow-up: 28 days     Jessica Nava CPhT  Pharmacy Care Coordinator  3/4/2025  11:39 EST    "

## 2025-03-27 RX ORDER — FAMOTIDINE 20 MG/1
20 TABLET, FILM COATED ORAL NIGHTLY
Qty: 90 TABLET | Refills: 0 | Status: SHIPPED | OUTPATIENT
Start: 2025-03-27

## 2025-03-27 NOTE — TELEPHONE ENCOUNTER
Rx Refill Note  Requested Prescriptions     Pending Prescriptions Disp Refills    famotidine (PEPCID) 20 MG tablet [Pharmacy Med Name: FAMOTIDINE 20 MG TABLET] 90 tablet 0     Sig: TAKE ONE TABLET BY MOUTH ONCE NIGHTLY      Last office visit with prescribing clinician: 10/28/2024   Last telemedicine visit with prescribing clinician: Visit date not found   Next office visit with prescribing clinician: 4/28/2025       Karina Ray MA  03/27/25, 14:51 EDT

## 2025-03-31 ENCOUNTER — SPECIALTY PHARMACY (OUTPATIENT)
Dept: CARDIOLOGY | Facility: CLINIC | Age: 65
End: 2025-03-31
Payer: COMMERCIAL

## 2025-03-31 NOTE — PROGRESS NOTES
" Specialty Pharmacy Patient Management Program  Cardiology Specialty Pharmacy Refill Outreach      Alex \"Bill\" is a 65 y.o. male contacted today regarding refills of his medication(s).    Specialty medication(s) and dose(s) confirmed: Repatha 140 mg SC every 14 days.    Other medications being refilled: n/a    Refill Questions      Flowsheet Row Most Recent Value   Changes to allergies? No   Changes to medications? No   New conditions or infections since last clinic visit No   Unplanned office visit, urgent care, ED, or hospital admission in the last 4 weeks  No   How does patient/caregiver feel medication is working? Very good   Financial problems or insurance changes  No   Since the previous refill, were any specialty medication doses or scheduled injections missed or delayed?  No   Does this patient require a clinical escalation to a pharmacist? No            Delivery Questions      Flowsheet Row Most Recent Value   Delivery method UPS   Delivery address verified with patient/caregiver? Yes   Delivery address Home   Other address preferred n/a   Number of medications in delivery 1   Medication(s) being filled and delivered Evolocumab (REPATHA)   Doses left of specialty medications 0   Copay verified? Yes   Copay amount 15.00   Copay form of payment Credit/debit on file   Delivery Date Selection 04/01/25   Signature Required No   Do you consent to receive electronic handouts?  No                   Follow-up: 28 days     Jessica Nava CPhT  Pharmacy Care Coordinator  3/31/2025  11:00 EDT    "

## 2025-04-28 ENCOUNTER — OFFICE VISIT (OUTPATIENT)
Dept: FAMILY MEDICINE CLINIC | Facility: CLINIC | Age: 65
End: 2025-04-28
Payer: COMMERCIAL

## 2025-04-28 ENCOUNTER — LAB (OUTPATIENT)
Dept: LAB | Facility: HOSPITAL | Age: 65
End: 2025-04-28
Payer: COMMERCIAL

## 2025-04-28 VITALS
HEIGHT: 72 IN | BODY MASS INDEX: 27.12 KG/M2 | SYSTOLIC BLOOD PRESSURE: 132 MMHG | DIASTOLIC BLOOD PRESSURE: 82 MMHG | WEIGHT: 200.2 LBS | OXYGEN SATURATION: 100 % | HEART RATE: 82 BPM

## 2025-04-28 DIAGNOSIS — I73.9 PAD (PERIPHERAL ARTERY DISEASE): ICD-10-CM

## 2025-04-28 DIAGNOSIS — I10 ESSENTIAL HYPERTENSION: ICD-10-CM

## 2025-04-28 DIAGNOSIS — R20.2 PARESTHESIA OF BOTH FEET: ICD-10-CM

## 2025-04-28 DIAGNOSIS — Z12.5 SCREENING FOR MALIGNANT NEOPLASM OF PROSTATE: ICD-10-CM

## 2025-04-28 DIAGNOSIS — R42 POSITIONAL LIGHTHEADEDNESS: ICD-10-CM

## 2025-04-28 DIAGNOSIS — E78.5 HYPERLIPIDEMIA, UNSPECIFIED HYPERLIPIDEMIA TYPE: ICD-10-CM

## 2025-04-28 DIAGNOSIS — R73.02 IMPAIRED GLUCOSE TOLERANCE: ICD-10-CM

## 2025-04-28 DIAGNOSIS — Z86.0100 HISTORY OF COLONIC POLYPS: ICD-10-CM

## 2025-04-28 DIAGNOSIS — M25.571 ACUTE RIGHT ANKLE PAIN: ICD-10-CM

## 2025-04-28 DIAGNOSIS — Z00.00 HEALTHCARE MAINTENANCE: ICD-10-CM

## 2025-04-28 DIAGNOSIS — Z00.00 HEALTHCARE MAINTENANCE: Primary | ICD-10-CM

## 2025-04-28 DIAGNOSIS — R09.89 BRUIT OF LEFT CAROTID ARTERY: ICD-10-CM

## 2025-04-28 DIAGNOSIS — Z87.891 PERSONAL HISTORY OF NICOTINE DEPENDENCE: ICD-10-CM

## 2025-04-28 DIAGNOSIS — Z13.6 ENCOUNTER FOR ABDOMINAL AORTIC ANEURYSM (AAA) SCREENING: ICD-10-CM

## 2025-04-28 DIAGNOSIS — M25.471 RIGHT ANKLE SWELLING: ICD-10-CM

## 2025-04-28 DIAGNOSIS — E55.9 VITAMIN D DEFICIENCY: ICD-10-CM

## 2025-04-28 LAB
BILIRUB UR QL STRIP: NEGATIVE
CLARITY UR: CLEAR
COLOR UR: YELLOW
GLUCOSE UR STRIP-MCNC: NEGATIVE MG/DL
HGB UR QL STRIP.AUTO: NEGATIVE
HOLD SPECIMEN: NORMAL
KETONES UR QL STRIP: NEGATIVE
LEUKOCYTE ESTERASE UR QL STRIP.AUTO: NEGATIVE
NITRITE UR QL STRIP: NEGATIVE
PH UR STRIP.AUTO: 7 [PH] (ref 5–8)
PROT UR QL STRIP: NEGATIVE
SP GR UR STRIP: 1.01 (ref 1–1.03)
UROBILINOGEN UR QL STRIP: NORMAL

## 2025-04-28 PROCEDURE — 82607 VITAMIN B-12: CPT

## 2025-04-28 PROCEDURE — 82306 VITAMIN D 25 HYDROXY: CPT

## 2025-04-28 PROCEDURE — G0103 PSA SCREENING: HCPCS

## 2025-04-28 PROCEDURE — 80050 GENERAL HEALTH PANEL: CPT

## 2025-04-28 PROCEDURE — 83036 HEMOGLOBIN GLYCOSYLATED A1C: CPT

## 2025-04-28 PROCEDURE — 84550 ASSAY OF BLOOD/URIC ACID: CPT

## 2025-04-28 PROCEDURE — 80061 LIPID PANEL: CPT

## 2025-04-28 PROCEDURE — 81003 URINALYSIS AUTO W/O SCOPE: CPT

## 2025-04-28 RX ORDER — METHYLPREDNISOLONE 4 MG/1
TABLET ORAL
Qty: 21 TABLET | Refills: 0 | Status: SHIPPED | OUTPATIENT
Start: 2025-04-28

## 2025-04-28 RX ORDER — LISINOPRIL AND HYDROCHLOROTHIAZIDE 12.5; 2 MG/1; MG/1
1 TABLET ORAL DAILY
Qty: 90 TABLET | Refills: 3 | Status: SHIPPED | OUTPATIENT
Start: 2025-04-28 | End: 2025-05-05

## 2025-04-28 RX ORDER — AMLODIPINE BESYLATE 5 MG/1
5 TABLET ORAL NIGHTLY
Qty: 90 TABLET | Refills: 3 | Status: SHIPPED | OUTPATIENT
Start: 2025-04-28

## 2025-04-28 NOTE — LETTER
[facility]  Vaccine Consent Form    Patient Name:  Alex Parr  Patient :  1960     Vaccine(s) Ordered    Pneumococcal Conjugate Vaccine 20-Valent All        Screening Checklist  The following questions should be completed prior to vaccination. If you answer “yes” to any question, it does not necessarily mean you should not be vaccinated. It just means we may need to clarify or ask more questions. If a question is unclear, please ask your healthcare provider to explain it.    Yes No   Any fever or moderate to severe illness today (mild illness and/or antibiotic treatment are not contraindications)?     Do you have a history of a serious reaction to any previous vaccinations, such as anaphylaxis, encephalopathy within 7 days, Guillain-Galatia syndrome within 6 weeks, seizure?     Have you received any live vaccine(s) (e.g MMR, CHAPARRITA) or any other vaccines in the last month (to ensure duplicate doses aren't given)?     Do you have an anaphylactic allergy to latex (DTaP, DTaP-IPV, Hep A, Hep B, MenB, RV, Td, Tdap), baker’s yeast (Hep B, HPV), polysorbates (RSV, nirsevimab, PCV 20, Rotavirrus, Tdap, Shingrix), or gelatin (CHAPARRITA, MMR)?     Do you have an anaphylactic allergy to neomycin (Rabies, CHAPARRITA, MMR, IPV, Hep A), polymyxin B (IPV), or streptomycin (IPV)?      Any cancer, leukemia, AIDS, or other immune system disorder? (CHAPARRITA, MMR, RV)     Do you have a parent, brother, or sister with an immune system problem (if immune competence of vaccine recipient clinically verified, can proceed)? (MMR, CHAPARRITA)     Any recent steroid treatments for >2 weeks, chemotherapy, or radiation treatment? (CHAPARRITA, MMR)     Have you received antibody-containing blood transfusions or IVIG in the past 11 months (recommended interval is dependent on product)? (MMR, CHAPARRITA)     Have you taken antiviral drugs (acyclovir, famciclovir, valacyclovir for CHAPARRITA) in the last 24 or 48 hours, respectively?      Are you pregnant or planning to become  "pregnant within 1 month? (CHAPARRITA, MMR, HPV, IPV, MenB, Abrexvy; For Hep B- refer to Engerix-B; For RSV - Abrysvo is indicated for 32-36 weeks of pregnancy from September to January)     For infants, have you ever been told your child has had intussusception or a medical emergency involving obstruction of the intestine (Rotavirus)? If not for an infant, can skip this question.         *Ordering Physicians/APC should be consulted if \"yes\" is checked by the patient or guardian above.  I have received, read, and understand the Vaccine Information Statement (VIS) for each vaccine ordered.  I have considered my or my child's health status as well as the health status of my close contacts.  I have taken the opportunity to discuss my vaccine questions with my or my child's health care provider.   I have requested that the ordered vaccine(s) be given to me or my child.  I understand the benefits and risks of the vaccines.  I understand that I should remain in the clinic for 15 minutes after receiving the vaccine(s).  _________________________________________________________  Signature of Patient or Parent/Legal Guardian ____________________  Date     "

## 2025-04-28 NOTE — PROGRESS NOTES
Reason for visit    Alex Parr is a 65 y.o. male who presents for annual comprehensive exam.    Chief Complaint   Patient presents with    Annual Exam     Gout in ankle       HPI     Here for physical.   He has been feeling well.   Thinks he may have gout in his right ankle. He notices pain and swelling for a couple of weeks. Denies injury. On concrete all day long at work. He has been eating more red meat lately.   He notes intermittent numbness in his feet more so at night - chronic issue.     Diet/Physical activity:  -Wants to resume walking. Was walking 3 miles/day up until a URI recently.   -Reports his diet has been about the same.     Immunizations:  -He has declined COVID/influenza vaccines in the past.   -Discussed Shingrix again and pneumococcal vaccination.     Cancer screening:   -His colonoscopy was normal in 2021 - 7 year recall.  -LDCT of the chest 4/22/24. This is due.   -PSA normal 4/2023. Due for monitoring.   -He denies any change in Fhx.    PHQ-9 Depression Screening  Little interest or pleasure in doing things? Not at all   Feeling down, depressed, or hopeless? Not at all   PHQ-2 Total Score 0   Trouble falling or staying asleep, or sleeping too much?     Feeling tired or having little energy?     Poor appetite or overeating?     Feeling bad about yourself - or that you are a failure or have let yourself or your family down?     Trouble concentrating on things, such as reading the newspaper or watching television?     Moving or speaking so slowly that other people could have noticed? Or the opposite - being so fidgety or restless that you have been moving around a lot more than usual?     Thoughts that you would be better off dead, or of hurting yourself in some way?     PHQ-9 Total Score     If you checked off any problems, how difficult have these problems made it for you to do your work, take care of things at home, or get along with other people? Not difficult at all         Substance  use:  -He quit smoking 5 years ago.  -1/2 ppd x 40 yrs.  -2 glasses of wine/night.  -Denies recreational drug use.  -Occasional diet soda.      AAA screen:  -Denies family history of AAA.  -Due for AAA ultrasound.      Dental/vision/skin:  -Current with dental and eye exams.  -Denies new/changing/concerning skin lesions.    Past Medical History:   Diagnosis Date    Gout     Hyperlipidemia     Hypertension        Past Surgical History:   Procedure Laterality Date    CARDIAC CATHETERIZATION N/A 2023    Procedure: Peripheral angiography;  Surgeon: Brandon Guo MD;  Location:  [facility] CATH INVASIVE LOCATION;  Service: Cardiovascular;  Laterality: N/A;    CARDIAC CATHETERIZATION N/A 2023    Procedure: Angioplasty-peripheral;  Surgeon: Brandon Guo MD;  Location:  [facility] CATH INVASIVE LOCATION;  Service: Cardiovascular;  Laterality: N/A;  Left subclavian intervention. No industry rep needed. Schedule in ~2-4 weeks    INGUINAL HERNIA REPAIR         Family History   Problem Relation Age of Onset    No Known Problems Mother     Stroke Father     Parkinsonism Father 80    Hypertension Father     ALS Maternal Grandmother     No Known Problems Maternal Grandfather     No Known Problems Paternal Grandmother     No Known Problems Paternal Grandfather        Social History     Socioeconomic History    Marital status:    Tobacco Use    Smoking status: Former     Current packs/day: 0.00     Average packs/day: 0.5 packs/day for 40.1 years (20.0 ttl pk-yrs)     Types: Cigarettes     Start date:      Quit date: 2020     Years since quittin.2    Smokeless tobacco: Never   Vaping Use    Vaping status: Never Used   Substance and Sexual Activity    Alcohol use: Yes     Alcohol/week: 14.0 standard drinks of alcohol     Types: 14 Glasses of wine per week     Comment: 2 glasses of wine/night    Drug use: No    Sexual activity: Defer       No Known Allergies    ROS    Review of Systems   Constitutional:   Negative for appetite change, chills, diaphoresis, fatigue, fever and unexpected weight loss.   HENT:  Positive for hearing loss. Negative for congestion, sore throat and swollen glands.    Eyes:  Negative for blurred vision and visual disturbance.   Respiratory:  Negative for cough and shortness of breath.    Cardiovascular:  Negative for chest pain.   Gastrointestinal:  Positive for GERD. Negative for abdominal pain, blood in stool, constipation and diarrhea.   Endocrine: Negative for polydipsia.   Genitourinary:  Negative for difficulty urinating, dysuria, hematuria, nocturia, scrotal swelling and testicular pain.   Musculoskeletal:  Positive for arthralgias and joint swelling.   Skin:  Negative for rash and skin lesions.   Neurological:  Positive for numbness (toes intermittently). Negative for dizziness and headache.   Hematological:  Negative for adenopathy.   Psychiatric/Behavioral:  Negative for sleep disturbance and depressed mood. The patient is not nervous/anxious.        Vitals:    04/28/25 1335   BP: 132/82   Pulse: 82   SpO2: 100%     Body mass index is 27.15 kg/m².      Current Outpatient Medications:     amLODIPine (NORVASC) 5 MG tablet, Take 1 tablet by mouth Every Night., Disp: 90 tablet, Rfl: 3    Aspirin Low Dose 81 MG EC tablet, TAKE 1 TABLET BY MOUTH DAILY, Disp: 90 tablet, Rfl: 0    cholecalciferol (VITAMIN D3) 25 MCG (1000 UT) tablet, Take 1 tablet by mouth Daily., Disp: , Rfl:     Evolocumab (REPATHA) solution auto-injector SureClick injection, Inject 1 mL under the skin into the appropriate area as directed Every 14 (Fourteen) Days., Disp: 2 mL, Rfl: 11    famotidine (PEPCID) 20 MG tablet, TAKE ONE TABLET BY MOUTH ONCE NIGHTLY, Disp: 90 tablet, Rfl: 0    lisinopril-hydrochlorothiazide (PRINZIDE,ZESTORETIC) 20-12.5 MG per tablet, Take 1 tablet by mouth Daily., Disp: 90 tablet, Rfl: 3    Magnesium 300 MG capsule, Take 300 mg by mouth Daily., Disp: , Rfl:     vitamin B-12 (CYANOCOBALAMIN) 1000  MCG tablet, Take 1 tablet by mouth Daily., Disp: , Rfl:     vitamin C (ASCORBIC ACID) 250 MG tablet, Take 1 tablet by mouth Daily. Takes 1 chewable tablet, Disp: , Rfl:     methylPREDNISolone (MEDROL) 4 MG dose pack, Take as directed on package instructions., Disp: 21 tablet, Rfl: 0    PE    Physical Exam  Vitals reviewed.   Constitutional:       General: He is not in acute distress.     Appearance: He is well-developed.   HENT:      Head: Normocephalic and atraumatic.      Right Ear: Hearing and tympanic membrane normal.      Left Ear: Hearing and tympanic membrane normal.      Mouth/Throat:      Mouth: Mucous membranes are moist.      Dentition: Normal dentition.      Pharynx: Oropharynx is clear.   Eyes:      Extraocular Movements: Extraocular movements intact.      Conjunctiva/sclera: Conjunctivae normal.      Pupils: Pupils are equal, round, and reactive to light.      Comments:      Neck:      Thyroid: No thyroid mass or thyromegaly.      Vascular: No carotid bruit.   Cardiovascular:      Rate and Rhythm: Normal rate and regular rhythm.      Pulses:           Dorsalis pedis pulses are 2+ on the right side and 2+ on the left side.        Posterior tibial pulses are 2+ on the right side and 2+ on the left side.      Heart sounds: No murmur heard.     No friction rub.   Pulmonary:      Effort: Pulmonary effort is normal.      Breath sounds: Normal breath sounds.   Abdominal:      General: Bowel sounds are normal. There is no abdominal bruit.      Palpations: Abdomen is soft. There is no mass.      Tenderness: There is no abdominal tenderness.   Musculoskeletal:         General: Normal range of motion.      Cervical back: Normal range of motion and neck supple.      Right ankle: Swelling present. Tenderness present over the medial malleolus.   Feet:      Right foot:      Skin integrity: Skin integrity normal. No skin breakdown.      Left foot:      Skin integrity: Skin integrity normal. No skin breakdown.    Lymphadenopathy:      Cervical: No cervical adenopathy.      Upper Body:      Right upper body: No supraclavicular adenopathy.      Left upper body: No supraclavicular adenopathy.   Skin:     General: Skin is warm and dry.      Findings: No rash.      Nails: There is no clubbing.      Comments: No suspicious nevi on clothed exam.    Neurological:      Mental Status: He is alert.      Gait: Gait normal.      Deep Tendon Reflexes: Reflexes normal.   Psychiatric:         Speech: Speech normal.         Behavior: Behavior normal.         A/P    Problem List Items Addressed This Visit          Cardiac and Vasculature    PAD (peripheral artery disease)    Overview   5/2023 BLE duplex: Right ENEIDA normal. The left ENEIDA is moderately reduced. Waveforms are consistent with aorto-iliac disease. Abnormal  digital pressures.  6/20/2023: 90% heavily calcified left common iliac artery stenosis status post lithotripsy and stenting with a ViaGlycoVaxyn VBX 8 x 39 mm covered stent. 70% calcified small left subclavian artery stenosis           Hyperlipidemia    Relevant Medications    Evolocumab (REPATHA) solution auto-injector SureClick injection    Essential hypertension    Relevant Medications    amLODIPine (NORVASC) 5 MG tablet    lisinopril-hydrochlorothiazide (PRINZIDE,ZESTORETIC) 20-12.5 MG per tablet       Endocrine and Metabolic    Vitamin D deficiency    Overview   1/19         Impaired glucose tolerance    Overview   A1C: 5.8 - 2/2020            Gastrointestinal Abdominal     History of colonic polyps    Overview   7/2021            Health Encounters    Healthcare maintenance - Primary    Relevant Orders    CBC (No Diff)    Comprehensive Metabolic Panel    Lipid Panel    TSH Rfx On Abnormal To Free T4    Urinalysis With Culture If Indicated - Urine, Clean Catch    Hemoglobin A1c    Vitamin D,25-Hydroxy    Uric Acid    Vitamin B12       Tobacco    Personal history of nicotine dependence    Overview   Quit in 2020         Relevant  Orders    [place] aaa screen limited    CT Chest Low Dose Wo     Other Visit Diagnoses         Screening for malignant neoplasm of prostate        Relevant Orders    PSA Screen    [place] aaa screen limited      Encounter for abdominal aortic aneurysm (AAA) screening          Positional lightheadedness        Relevant Orders    Duplex Carotid Ultrasound CAR      Bruit of left carotid artery        Relevant Orders    Duplex Carotid Ultrasound CAR      Acute right ankle pain          Paresthesia of both feet          Right ankle swelling              -Counseled patient regarding cancer screening, immunizations, healthy lifestyle, diet, maintaining a healthy weight, and exercise.   -Prevnar 20 given in office today.   -Annual dental and eye exams were encouraged.   -Check screening labs as ordered. Patient is fasting today.   -AAA ultrasound requested.  -Low-dose CT of the chest ordered for lung cancer screening.  -Order carotid artery duplex due to left carotid bruit on exam today. Patient notes an occasional positional lightheadedness.  -Suspected gout of right ankle.  Start Medrol Dosepak.  Encouraged low purine diet.  Checking uric acid level today.  -Check vitamin B12 level due to feet paresthesias. Also monitoring A1c today. Pedal pulses are intact and the patient does not report any claudication symptoms at this time.  -RTC in 6 months for follow-up of hypertension and chronic conditions, sooner prn.     Plan of care was reviewed with patient at the conclusion of today's visit. Education was provided regarding diagnoses, management, treatment plan, and the importance of keeping follow-up appointments. The patient was counseled regarding the risks, benefits, and possible side-effects of treatment. Patient and/or family expresses understanding and agreement with the management plan.        Zaid Pena PA-C

## 2025-04-29 ENCOUNTER — SPECIALTY PHARMACY (OUTPATIENT)
Dept: CARDIOLOGY | Facility: CLINIC | Age: 65
End: 2025-04-29
Payer: COMMERCIAL

## 2025-04-29 LAB
25(OH)D3 SERPL-MCNC: 42.2 NG/ML (ref 30–100)
ALBUMIN SERPL-MCNC: 4.6 G/DL (ref 3.5–5.2)
ALBUMIN/GLOB SERPL: 1.5 G/DL
ALP SERPL-CCNC: 108 U/L (ref 39–117)
ALT SERPL W P-5'-P-CCNC: 12 U/L (ref 1–41)
ANION GAP SERPL CALCULATED.3IONS-SCNC: 12.8 MMOL/L (ref 5–15)
AST SERPL-CCNC: 23 U/L (ref 1–40)
BILIRUB SERPL-MCNC: 0.4 MG/DL (ref 0–1.2)
BUN SERPL-MCNC: 11 MG/DL (ref 8–23)
BUN/CREAT SERPL: 11.2 (ref 7–25)
CALCIUM SPEC-SCNC: 10.2 MG/DL (ref 8.6–10.5)
CHLORIDE SERPL-SCNC: 100 MMOL/L (ref 98–107)
CHOLEST SERPL-MCNC: 117 MG/DL (ref 0–200)
CO2 SERPL-SCNC: 24.2 MMOL/L (ref 22–29)
CREAT SERPL-MCNC: 0.98 MG/DL (ref 0.76–1.27)
DEPRECATED RDW RBC AUTO: 40.1 FL (ref 37–54)
EGFRCR SERPLBLD CKD-EPI 2021: 85.6 ML/MIN/1.73
ERYTHROCYTE [DISTWIDTH] IN BLOOD BY AUTOMATED COUNT: 12 % (ref 12.3–15.4)
GLOBULIN UR ELPH-MCNC: 3.1 GM/DL
GLUCOSE SERPL-MCNC: 100 MG/DL (ref 65–99)
HBA1C MFR BLD: 5.8 % (ref 4.8–5.6)
HCT VFR BLD AUTO: 48.7 % (ref 37.5–51)
HDLC SERPL-MCNC: 61 MG/DL (ref 40–60)
HGB BLD-MCNC: 16 G/DL (ref 13–17.7)
LDLC SERPL CALC-MCNC: 43 MG/DL (ref 0–100)
LDLC/HDLC SERPL: 0.72 {RATIO}
MCH RBC QN AUTO: 29.9 PG (ref 26.6–33)
MCHC RBC AUTO-ENTMCNC: 32.9 G/DL (ref 31.5–35.7)
MCV RBC AUTO: 90.9 FL (ref 79–97)
PLATELET # BLD AUTO: 328 10*3/MM3 (ref 140–450)
PMV BLD AUTO: 10.5 FL (ref 6–12)
POTASSIUM SERPL-SCNC: 4.7 MMOL/L (ref 3.5–5.2)
PROT SERPL-MCNC: 7.7 G/DL (ref 6–8.5)
PSA SERPL-MCNC: 1.03 NG/ML (ref 0–4)
RBC # BLD AUTO: 5.36 10*6/MM3 (ref 4.14–5.8)
SODIUM SERPL-SCNC: 137 MMOL/L (ref 136–145)
TRIGL SERPL-MCNC: 60 MG/DL (ref 0–150)
TSH SERPL DL<=0.05 MIU/L-ACNC: 1.87 UIU/ML (ref 0.27–4.2)
URATE SERPL-MCNC: 7.5 MG/DL (ref 3.4–7)
VIT B12 BLD-MCNC: 1724 PG/ML (ref 211–946)
VLDLC SERPL-MCNC: 13 MG/DL (ref 5–40)
WBC NRBC COR # BLD AUTO: 9 10*3/MM3 (ref 3.4–10.8)

## 2025-04-29 NOTE — PROGRESS NOTES
" Specialty Pharmacy Patient Management Program  Cardiology Specialty Pharmacy Refill Outreach      Alex \"Bill\" is a 65 y.o. male contacted today regarding refills of his medication(s).    Specialty medication(s) and dose(s) confirmed: Repatha  Other medications being refilled: n/a    Refill Questions      Flowsheet Row Most Recent Value   Changes to allergies? No   Changes to medications? No   New conditions or infections since last clinic visit No   Unplanned office visit, urgent care, ED, or hospital admission in the last 4 weeks  No   How does patient/caregiver feel medication is working? Very good   Financial problems or insurance changes  No   Since the previous refill, were any specialty medication doses or scheduled injections missed or delayed?  No   Does this patient require a clinical escalation to a pharmacist? No            Delivery Questions      Flowsheet Row Most Recent Value   Delivery method UPS   Delivery address verified with patient/caregiver? Yes   Delivery address Home   Number of medications in delivery 1   Medication(s) being filled and delivered Evolocumab (REPATHA)   Doses left of specialty medications 0   Copay verified? Yes   Copay amount 15.00   Copay form of payment Credit/debit on file   Delivery Date Selection 05/01/25   Signature Required No   Do you consent to receive electronic handouts?  Yes                   Follow-up: 21 days     Tyrell Resendiz CPhT-Adv  Pharmacy Care Coordinator  4/29/2025  13:59 EDT  "

## 2025-05-02 ENCOUNTER — RESULTS FOLLOW-UP (OUTPATIENT)
Dept: LAB | Facility: HOSPITAL | Age: 65
End: 2025-05-02
Payer: COMMERCIAL

## 2025-05-05 RX ORDER — LOSARTAN POTASSIUM 100 MG/1
100 TABLET ORAL DAILY
Qty: 30 TABLET | Refills: 1 | Status: SHIPPED | OUTPATIENT
Start: 2025-05-05

## 2025-05-11 DIAGNOSIS — Z00.00 HEALTHCARE MAINTENANCE: Primary | ICD-10-CM

## 2025-05-12 ENCOUNTER — HOSPITAL ENCOUNTER (OUTPATIENT)
Dept: CT IMAGING | Facility: HOSPITAL | Age: 65
Discharge: HOME | End: 2025-05-12
Admitting: PHYSICIAN ASSISTANT
Payer: COMMERCIAL

## 2025-05-12 DIAGNOSIS — Z87.891 PERSONAL HISTORY OF NICOTINE DEPENDENCE: ICD-10-CM

## 2025-05-12 PROCEDURE — 71271 CT THORAX LUNG CANCER SCR C-: CPT

## 2025-05-12 RX ORDER — ASPIRIN 81 MG/1
81 TABLET, COATED ORAL DAILY
Qty: 90 TABLET | Refills: 0 | Status: SHIPPED | OUTPATIENT
Start: 2025-05-12

## 2025-05-12 NOTE — TELEPHONE ENCOUNTER
Requested Prescriptions     Pending Prescriptions Disp Refills    Aspirin Low Dose 81 MG EC tablet [Pharmacy Med Name: ASPIRIN EC 81 MG TABLET] 90 tablet 0     Sig: TAKE 1 TABLET BY MOUTH DAILY

## 2025-05-29 ENCOUNTER — SPECIALTY PHARMACY (OUTPATIENT)
Dept: CARDIOLOGY | Facility: CLINIC | Age: 65
End: 2025-05-29
Payer: COMMERCIAL

## 2025-05-29 NOTE — PROGRESS NOTES
" Specialty Pharmacy Patient Management Program  [facility] Specialty Pharmacy Refill Outreach      Alex \"Bill\" is a 65 y.o. male contacted today regarding refills of his medication(s).    Specialty medication(s) and dose(s) confirmed: Repatha  Other medications being refilled: n/a    Refill Questions      Flowsheet Row Most Recent Value   Changes to allergies? No   Changes to medications? No   New conditions or infections since last clinic visit No   Unplanned office visit, urgent care, ED, or hospital admission in the last 4 weeks  No   How does patient/caregiver feel medication is working? Very good   Financial problems or insurance changes  No   Since the previous refill, were any specialty medication doses or scheduled injections missed or delayed?  No   Does this patient require a clinical escalation to a pharmacist? No            Delivery Questions      Flowsheet Row Most Recent Value   Delivery method UPS   Delivery address verified with patient/caregiver? Yes   Delivery address Home   Number of medications in delivery 1   Medication(s) being filled and delivered Evolocumab (REPATHA)   Doses left of specialty medications 0   Copay verified? Yes   Copay amount $15   Copay form of payment Credit/debit on file   Delivery Date Selection 05/30/25   Signature Required No   Do you consent to receive electronic handouts?  No                   Follow-up: 21 days     Tyrell Resendiz CPhT-Adv  Pharmacy Care Coordinator  5/29/2025  11:24 EDT  "

## 2025-06-02 ENCOUNTER — OFFICE VISIT (OUTPATIENT)
Dept: FAMILY MEDICINE CLINIC | Facility: CLINIC | Age: 65
End: 2025-06-02
Payer: COMMERCIAL

## 2025-06-02 VITALS
HEIGHT: 72 IN | DIASTOLIC BLOOD PRESSURE: 84 MMHG | OXYGEN SATURATION: 97 % | SYSTOLIC BLOOD PRESSURE: 142 MMHG | WEIGHT: 200.2 LBS | HEART RATE: 86 BPM | BODY MASS INDEX: 27.12 KG/M2

## 2025-06-02 DIAGNOSIS — I10 ESSENTIAL HYPERTENSION: Primary | ICD-10-CM

## 2025-06-02 PROCEDURE — 99214 OFFICE O/P EST MOD 30 MIN: CPT | Performed by: PHYSICIAN ASSISTANT

## 2025-06-02 RX ORDER — LOSARTAN POTASSIUM 100 MG/1
100 TABLET ORAL DAILY
Qty: 90 TABLET | Refills: 3 | Status: SHIPPED | OUTPATIENT
Start: 2025-06-02

## 2025-06-02 RX ORDER — AMLODIPINE BESYLATE 10 MG/1
10 TABLET ORAL NIGHTLY
Qty: 90 TABLET | Refills: 3 | Status: SHIPPED | OUTPATIENT
Start: 2025-06-02

## 2025-06-02 RX ORDER — METOPROLOL SUCCINATE 25 MG/1
25 TABLET, EXTENDED RELEASE ORAL DAILY
Qty: 30 TABLET | Refills: 1 | Status: SHIPPED | OUTPATIENT
Start: 2025-06-02

## 2025-06-02 NOTE — PATIENT INSTRUCTIONS
If average BP readings are higher than 140/90 after 2 weeks, please send me a message so that we can adjust your medications.

## 2025-06-02 NOTE — PROGRESS NOTES
Chief Complaint   Patient presents with    Hypertension       HPI     Alex Parr is a 65 y.o. male who is here for follow-up of hypertension .     He states his BP this morning was in the 170s. When he gets home from work, it is in the 160s/90s. He increased amlodipine to 10 mg nightly a couple of weeks ago. Compliant on losartan. His gout flare is resolved after treatment.      Past Medical History:   Diagnosis Date    Gout     Hyperlipidemia     Hypertension        Past Surgical History:   Procedure Laterality Date    CARDIAC CATHETERIZATION N/A 2023    Procedure: Peripheral angiography;  Surgeon: Brandon Guo MD;  Location:  [facility]ATH INVASIVE LOCATION;  Service: Cardiovascular;  Laterality: N/A;    CARDIAC CATHETERIZATION N/A 2023    Procedure: Angioplasty-peripheral;  Surgeon: Brandon Guo MD;  Location:  [facility]ATH INVASIVE LOCATION;  Service: Cardiovascular;  Laterality: N/A;  Left subclavian intervention. No industry rep needed. Schedule in ~2-4 weeks    INGUINAL HERNIA REPAIR         Family History   Problem Relation Age of Onset    No Known Problems Mother     Stroke Father     Parkinsonism Father 80    Hypertension Father     ALS Maternal Grandmother     No Known Problems Maternal Grandfather     No Known Problems Paternal Grandmother     No Known Problems Paternal Grandfather        Social History     Socioeconomic History    Marital status:    Tobacco Use    Smoking status: Former     Current packs/day: 0.00     Average packs/day: 0.5 packs/day for 40.1 years (20.0 ttl pk-yrs)     Types: Cigarettes     Start date:      Quit date: 2020     Years since quittin.3    Smokeless tobacco: Never   Vaping Use    Vaping status: Never Used   Substance and Sexual Activity    Alcohol use: Yes     Alcohol/week: 14.0 standard drinks of alcohol     Types: 14 Glasses of wine per week     Comment: 2 glasses of wine/night    Drug use: No    Sexual activity: Defer       No Known  Allergies    ROS    Review of Systems   Eyes:  Negative for blurred vision.   Respiratory:  Negative for shortness of breath.    Cardiovascular:  Negative for chest pain.   Neurological:  Negative for dizziness and headache.       Vitals:    06/02/25 1028   BP: 142/84   Pulse: 86   SpO2: 97%     Body mass index is 27.15 kg/m².      Current Outpatient Medications:     amLODIPine (NORVASC) 10 MG tablet, Take 1 tablet by mouth Every Night., Disp: 90 tablet, Rfl: 3    Aspirin Low Dose 81 MG EC tablet, TAKE 1 TABLET BY MOUTH DAILY, Disp: 90 tablet, Rfl: 0    cholecalciferol (VITAMIN D3) 25 MCG (1000 UT) tablet, Take 1 tablet by mouth Daily., Disp: , Rfl:     Evolocumab (REPATHA) solution auto-injector SureClick injection, Inject 1 mL under the skin into the appropriate area as directed Every 14 (Fourteen) Days., Disp: 2 mL, Rfl: 11    famotidine (PEPCID) 20 MG tablet, TAKE ONE TABLET BY MOUTH ONCE NIGHTLY, Disp: 90 tablet, Rfl: 0    losartan (COZAAR) 100 MG tablet, Take 1 tablet by mouth Daily., Disp: 90 tablet, Rfl: 3    Magnesium 300 MG capsule, Take 300 mg by mouth Daily., Disp: , Rfl:     vitamin B-12 (CYANOCOBALAMIN) 1000 MCG tablet, Take 1 tablet by mouth Daily., Disp: , Rfl:     vitamin C (ASCORBIC ACID) 250 MG tablet, Take 1 tablet by mouth Daily. Takes 1 chewable tablet, Disp: , Rfl:     metoprolol succinate XL (Toprol XL) 25 MG 24 hr tablet, Take 1 tablet by mouth Daily., Disp: 30 tablet, Rfl: 1    PE    Physical Exam  Vitals reviewed.   Constitutional:       General: He is not in acute distress.     Appearance: He is well-developed.   HENT:      Head: Normocephalic and atraumatic.   Eyes:      Conjunctiva/sclera: Conjunctivae normal.   Cardiovascular:      Rate and Rhythm: Normal rate and regular rhythm.      Heart sounds: Normal heart sounds. No murmur heard.  Pulmonary:      Effort: Pulmonary effort is normal.      Breath sounds: Normal breath sounds.   Musculoskeletal:      Cervical back: Normal range of  motion.   Skin:     General: Skin is warm and dry.   Neurological:      Mental Status: He is alert.      Gait: Gait normal.   Psychiatric:         Speech: Speech normal.         Behavior: Behavior normal.         Results    Results for orders placed or performed in visit on 04/28/25   CBC (No Diff)    Collection Time: 04/28/25  2:36 PM    Specimen: Blood   Result Value Ref Range    WBC 9.00 3.40 - 10.80 10*3/mm3    RBC 5.36 4.14 - 5.80 10*6/mm3    Hemoglobin 16.0 13.0 - 17.7 g/dL    Hematocrit 48.7 37.5 - 51.0 %    MCV 90.9 79.0 - 97.0 fL    MCH 29.9 26.6 - 33.0 pg    MCHC 32.9 31.5 - 35.7 g/dL    RDW 12.0 (L) 12.3 - 15.4 %    RDW-SD 40.1 37.0 - 54.0 fl    MPV 10.5 6.0 - 12.0 fL    Platelets 328 140 - 450 10*3/mm3   Comprehensive Metabolic Panel    Collection Time: 04/28/25  2:36 PM    Specimen: Blood   Result Value Ref Range    Glucose 100 (H) 65 - 99 mg/dL    BUN 11 8 - 23 mg/dL    Creatinine 0.98 0.76 - 1.27 mg/dL    Sodium 137 136 - 145 mmol/L    Potassium 4.7 3.5 - 5.2 mmol/L    Chloride 100 98 - 107 mmol/L    CO2 24.2 22.0 - 29.0 mmol/L    Calcium 10.2 8.6 - 10.5 mg/dL    Total Protein 7.7 6.0 - 8.5 g/dL    Albumin 4.6 3.5 - 5.2 g/dL    ALT (SGPT) 12 1 - 41 U/L    AST (SGOT) 23 1 - 40 U/L    Alkaline Phosphatase 108 39 - 117 U/L    Total Bilirubin 0.4 0.0 - 1.2 mg/dL    Globulin 3.1 gm/dL    A/G Ratio 1.5 g/dL    BUN/Creatinine Ratio 11.2 7.0 - 25.0    Anion Gap 12.8 5.0 - 15.0 mmol/L    eGFR 85.6 >60.0 mL/min/1.73   Lipid Panel    Collection Time: 04/28/25  2:36 PM    Specimen: Blood   Result Value Ref Range    Total Cholesterol 117 0 - 200 mg/dL    Triglycerides 60 0 - 150 mg/dL    HDL Cholesterol 61 (H) 40 - 60 mg/dL    LDL Cholesterol  43 0 - 100 mg/dL    VLDL Cholesterol 13 5 - 40 mg/dL    LDL/HDL Ratio 0.72    TSH Rfx On Abnormal To Free T4    Collection Time: 04/28/25  2:36 PM    Specimen: Blood   Result Value Ref Range    TSH 1.870 0.270 - 4.200 uIU/mL   Urinalysis With Culture If Indicated - Urine,  Clean Catch    Collection Time: 04/28/25  2:36 PM    Specimen: Urine, Clean Catch   Result Value Ref Range    Color, UA Yellow Yellow, Straw    Appearance, UA Clear Clear    pH, UA 7.0 5.0 - 8.0    Specific Gravity, UA 1.007 1.005 - 1.030    Glucose, UA Negative Negative    Ketones, UA Negative Negative    Bilirubin, UA Negative Negative    Blood, UA Negative Negative    Protein, UA Negative Negative    Leuk Esterase, UA Negative Negative    Nitrite, UA Negative Negative    Urobilinogen, UA 0.2 E.U./dL 0.2 - 1.0 E.U./dL   Hemoglobin A1c    Collection Time: 04/28/25  2:36 PM    Specimen: Blood   Result Value Ref Range    Hemoglobin A1C 5.80 (H) 4.80 - 5.60 %   PSA Screen    Collection Time: 04/28/25  2:36 PM    Specimen: Blood   Result Value Ref Range    PSA 1.030 0.000 - 4.000 ng/mL   Vitamin D,25-Hydroxy    Collection Time: 04/28/25  2:36 PM    Specimen: Blood   Result Value Ref Range    25 Hydroxy, Vitamin D 42.2 30.0 - 100.0 ng/ml   Uric Acid    Collection Time: 04/28/25  2:36 PM    Specimen: Blood   Result Value Ref Range    Uric Acid 7.5 (H) 3.4 - 7.0 mg/dL   Vitamin B12    Collection Time: 04/28/25  2:36 PM    Specimen: Blood   Result Value Ref Range    Vitamin B-12 1,724 (H) 211 - 946 pg/mL   [place] Urine Culture Tube - Urine, Clean Catch    Collection Time: 04/28/25  2:36 PM    Specimen: Urine, Clean Catch   Result Value Ref Range    Extra Tube Hold for add-ons.        A/P    Problem List Items Addressed This Visit          Cardiac and Vasculature    Essential hypertension - Primary    Relevant Medications    metoprolol succinate XL (Toprol XL) 25 MG 24 hr tablet    losartan (COZAAR) 100 MG tablet    amLODIPine (NORVASC) 10 MG tablet     -BP remains elevated today and on home readings. Hctz was discontinued due to hyperuricemia and gout. Start metoprolol 25 mg daily and continue losartan and amlodipine.   -Patient was counseled to send me a message through [employer] with his home readings in 2 weeks.   -RTC  in 1 month for BP check, sooner prn.     Plan of care was reviewed with patient at the conclusion of today's visit. Education was provided regarding diagnoses, management, and the importance of keeping follow-up appointments. The patient was counseled regarding the risks, benefits, and possible side-effects of treatment. Patient and/or family express understanding and agreement with the management plan.        Zaid Pena PA-C

## 2025-06-18 ENCOUNTER — PATIENT MESSAGE (OUTPATIENT)
Dept: FAMILY MEDICINE CLINIC | Facility: CLINIC | Age: 65
End: 2025-06-18
Payer: COMMERCIAL

## 2025-06-20 ENCOUNTER — OFFICE VISIT (OUTPATIENT)
Dept: FAMILY MEDICINE CLINIC | Facility: CLINIC | Age: 65
End: 2025-06-20
Payer: COMMERCIAL

## 2025-06-20 VITALS
HEART RATE: 88 BPM | SYSTOLIC BLOOD PRESSURE: 128 MMHG | DIASTOLIC BLOOD PRESSURE: 76 MMHG | WEIGHT: 201 LBS | HEIGHT: 72 IN | OXYGEN SATURATION: 97 % | BODY MASS INDEX: 27.22 KG/M2

## 2025-06-20 DIAGNOSIS — R11.0 NAUSEA: ICD-10-CM

## 2025-06-20 DIAGNOSIS — R53.83 FATIGUE, UNSPECIFIED TYPE: ICD-10-CM

## 2025-06-20 DIAGNOSIS — I10 ESSENTIAL HYPERTENSION: Primary | ICD-10-CM

## 2025-06-20 DIAGNOSIS — R42 POSITIONAL LIGHTHEADEDNESS: ICD-10-CM

## 2025-06-20 PROCEDURE — 99214 OFFICE O/P EST MOD 30 MIN: CPT | Performed by: PHYSICIAN ASSISTANT

## 2025-06-20 NOTE — PROGRESS NOTES
"    Chief Complaint   Patient presents with    Hypertension       HPI     Alex Parr is a pleasant 65 y.o. male who presents for evaluation of \"chief complaint.\"     He states he feels very tired and intermittently lightheaded and nauseated since starting metoprolol at his last visit here on 6/2. He has positional lightheadedness when he bends over. No syncope. He has been staying hydrated. He states his blood pressure readings have been in the 150/90s on average but he has not been checking resting readings and sometimes checks before he takes his medications. He also feels a heat sensation in his ears. Has slight headache. No blurred vision currently but sometimes he has this when he wakes up. He has been eating a lot of salted crackers. He bought a new blood pressure cuff about 1 year ago.     Past Medical History:   Diagnosis Date    Gout     Hyperlipidemia     Hypertension        Past Surgical History:   Procedure Laterality Date    CARDIAC CATHETERIZATION N/A 6/20/2023    Procedure: Peripheral angiography;  Surgeon: Brandon Guo MD;  Location:  [facility];  Service: Cardiovascular;  Laterality: N/A;    CARDIAC CATHETERIZATION N/A 7/20/2023    Procedure: Angioplasty-peripheral;  Surgeon: Brandon Guo MD;  Location:  [facility];  Service: Cardiovascular;  Laterality: N/A;  Left subclavian intervention. No industry rep needed. Schedule in ~2-4 weeks    INGUINAL HERNIA REPAIR  2001       Family History   Problem Relation Age of Onset    No Known Problems Mother     Stroke Father     Parkinsonism Father 80    Hypertension Father     ALS Maternal Grandmother     No Known Problems Maternal Grandfather     No Known Problems Paternal Grandmother     No Known Problems Paternal Grandfather        Social History     Socioeconomic History    Marital status:    Tobacco Use    Smoking status: Former     Current packs/day: 0.00     Average packs/day: 0.5 packs/day for 40.1 " years (20.0 ttl pk-yrs)     Types: Cigarettes     Start date:      Quit date: 2020     Years since quittin.4    Smokeless tobacco: Never   Vaping Use    Vaping status: Never Used   Substance and Sexual Activity    Alcohol use: Yes     Alcohol/week: 14.0 standard drinks of alcohol     Types: 14 Glasses of wine per week     Comment: 2 glasses of wine/night    Drug use: No    Sexual activity: Defer       No Known Allergies    ROS    Review of Systems   Constitutional:  Positive for fatigue.   Eyes:  Negative for blurred vision.   Respiratory:  Negative for shortness of breath.    Cardiovascular:  Negative for chest pain.   Gastrointestinal:  Positive for nausea.   Neurological:  Positive for light-headedness. Negative for headache.       Vitals:    25 1223   BP: 128/76   Pulse:    SpO2:      Body mass index is 27.25 kg/m².      Current Outpatient Medications:     amLODIPine (NORVASC) 10 MG tablet, Take 1 tablet by mouth Every Night., Disp: 90 tablet, Rfl: 3    Aspirin Low Dose 81 MG EC tablet, TAKE 1 TABLET BY MOUTH DAILY, Disp: 90 tablet, Rfl: 0    cholecalciferol (VITAMIN D3) 25 MCG (1000 UT) tablet, Take 1 tablet by mouth Daily., Disp: , Rfl:     Evolocumab (REPATHA) solution auto-injector SureClick injection, Inject 1 mL under the skin into the appropriate area as directed Every 14 (Fourteen) Days., Disp: 2 mL, Rfl: 11    famotidine (PEPCID) 20 MG tablet, TAKE ONE TABLET BY MOUTH ONCE NIGHTLY, Disp: 90 tablet, Rfl: 0    losartan (COZAAR) 100 MG tablet, Take 1 tablet by mouth Daily., Disp: 90 tablet, Rfl: 3    Magnesium 300 MG capsule, Take 300 mg by mouth Daily., Disp: , Rfl:     metoprolol succinate XL (Toprol XL) 25 MG 24 hr tablet, Take 1 tablet by mouth Daily., Disp: 30 tablet, Rfl: 1    vitamin B-12 (CYANOCOBALAMIN) 1000 MCG tablet, Take 1 tablet by mouth Daily., Disp: , Rfl:     vitamin C (ASCORBIC ACID) 250 MG tablet, Take 1 tablet by mouth Daily. Takes 1 chewable tablet, Disp: , Rfl:      PE    Physical Exam  Vitals reviewed.   Constitutional:       General: He is not in acute distress.     Appearance: He is well-developed.   HENT:      Head: Normocephalic and atraumatic.   Eyes:      Conjunctiva/sclera: Conjunctivae normal.   Cardiovascular:      Rate and Rhythm: Normal rate and regular rhythm.      Heart sounds: Normal heart sounds. No murmur heard.  Pulmonary:      Effort: Pulmonary effort is normal.      Breath sounds: Normal breath sounds.   Musculoskeletal:      Cervical back: Normal range of motion.   Skin:     General: Skin is warm and dry.   Neurological:      Mental Status: He is alert.      Gait: Gait normal.   Psychiatric:         Speech: Speech normal.         Behavior: Behavior normal.          A/P    Problem List Items Addressed This Visit          Cardiac and Vasculature    Essential hypertension - Primary    Relevant Medications    metoprolol succinate XL (Toprol XL) 25 MG 24 hr tablet    losartan (COZAAR) 100 MG tablet    amLODIPine (NORVASC) 10 MG tablet     Other Visit Diagnoses         Positional lightheadedness          Nausea          Fatigue, unspecified type              -BP initially elevated in office but on repeat 128/76 after the patient was allowed to rest in the exam room for 5 minutes. Recommended the patient monitor his blood pressures resting and bring his home cuff to his follow-up appointment on 7/14.   -Lightheadedness, fatigue, and nausea could be side effects from metoprolol.  We discussed these may be self-limited.  He would like to continue to try this for another week or 2 to see if the symptoms will resolve which is reasonable.  Continue losartan and Norvasc.  If his side effects do not resolve, we will discontinue metoprolol and changed to losartan hydrochlorothiazide.  -Return to clinic as previously scheduled on 714, sooner as needed.    Plan of care was reviewed with patient at the conclusion of today's visit. Education was provided regarding  diagnoses, management, prescribed or recommended OTC products, and the importance of compliance with follow-up appointments. The patient was counseled regarding the risks, benefits, and possible side-effects of treatment. I advised the patient to keep me informed of any acute changes in their status including new, worsening, or persistent symptoms. Patient expresses understanding and agreement with the management plan.        Zaid Pena PA-C

## 2025-06-23 ENCOUNTER — OFFICE VISIT (OUTPATIENT)
Dept: CARDIOLOGY | Facility: CLINIC | Age: 65
End: 2025-06-23
Payer: COMMERCIAL

## 2025-06-23 VITALS
BODY MASS INDEX: 27.04 KG/M2 | OXYGEN SATURATION: 97 % | HEART RATE: 76 BPM | WEIGHT: 199.6 LBS | SYSTOLIC BLOOD PRESSURE: 118 MMHG | HEIGHT: 72 IN | DIASTOLIC BLOOD PRESSURE: 66 MMHG

## 2025-06-23 DIAGNOSIS — I10 ESSENTIAL HYPERTENSION: ICD-10-CM

## 2025-06-23 DIAGNOSIS — I73.9 PAD (PERIPHERAL ARTERY DISEASE): Primary | ICD-10-CM

## 2025-06-23 DIAGNOSIS — E78.5 HYPERLIPIDEMIA, UNSPECIFIED HYPERLIPIDEMIA TYPE: ICD-10-CM

## 2025-06-23 PROCEDURE — 99214 OFFICE O/P EST MOD 30 MIN: CPT | Performed by: INTERNAL MEDICINE

## 2025-06-23 PROCEDURE — 93000 ELECTROCARDIOGRAM COMPLETE: CPT | Performed by: INTERNAL MEDICINE

## 2025-06-23 RX ORDER — FAMOTIDINE 20 MG/1
20 TABLET, FILM COATED ORAL NIGHTLY
Qty: 90 TABLET | Refills: 0 | Status: SHIPPED | OUTPATIENT
Start: 2025-06-23

## 2025-06-23 RX ORDER — MULTIPLE VITAMINS W/ MINERALS TAB 9MG-400MCG
1 TAB ORAL DAILY
COMMUNITY

## 2025-06-23 NOTE — TELEPHONE ENCOUNTER
Rx Refill Note  Requested Prescriptions     Pending Prescriptions Disp Refills    famotidine (PEPCID) 20 MG tablet [Pharmacy Med Name: FAMOTIDINE 20 MG TABLET] 90 tablet 0     Sig: TAKE ONE TABLET BY MOUTH ONCE NIGHTLY      Last office visit with prescribing clinician: 6/20/2025   Last telemedicine visit with prescribing clinician: Visit date not found   Next office visit with prescribing clinician: 7/14/2025                         Would you like a call back once the refill request has been completed: [] Yes [] No    If the office needs to give you a call back, can they leave a voicemail: [] Yes [] No    Milana Brush MA  06/23/25, 10:17 EDT

## 2025-06-23 NOTE — PROGRESS NOTES
[facility]   [address], [address]  [place], [place], [address]    [phone]  [email or web address]           OUTPATIENT CLINIC FOLLOW UP NOTE    Patient Care Team:  Patient Care Team:  Zaid Pena PA-C as PCP - General (Physician Assistant)  Khalif Serna MD as Consulting Physician (Gastroenterology)  Brandon Guo MD as Consulting Physician (Cardiology)    Subjective:      Chief Complaint   Patient presents with    SET PAD         Alex Parr is a 65 y.o. male.  Cardiac focused, problem list:  Hypertension  Hyperlipidemia  Lower extremity PAD  Lower extremity arterial duplex, 5/15/2023: Right ENEIDA is normal, normal digital pressures.  Left ENEIDA is moderately reduced, waveforms consistent with aortic iliac disease, abnormal digital pressures.  Brachial blood pressure gradient is greater than 15 mmHg, suggestive of left subclavian artery disease.  Peripheral angiogram 6/20/2023:  90% heavily calcified left common iliac artery status post lithotripsy and stenting. 70% calcified small left subclavian artery stenosis  Left subclavian artery stenosis; upper extremity PAD  Left upper extremity arterial duplex, 5/15/2023: High-grade stenosis of the left proximal left subclavian artery noted with retrograde left vertebral flow.  Rest of the left upper extremity arteries are free of significant disease.  Peripheral angiogram, 7/20/2023:  80% left subclavian artery stenosis status post scoring balloon angioplasty and stenting.   Osteoarthritis  Prior left shoulder injection  Left hip bursitis   status post injections.  Historically has helped.  Repeat injection 01/2023 did not help  GERD  Gout  Former nicotine dependence  Smoked for 30 years, quit in 2020    HPI:    No significant claudication in the lower extremities.  Mild left shoulder pain.  Recent lightheadedness.  Some improvement with variation/changes to his blood pressure medicines.  Blood pressure has been  "better.  Denies chest pain.    Review of Systems:  As noted above in the HPI     PFSH:  Patient Active Problem List   Diagnosis    Essential hypertension    Hyperlipidemia    Gout    Personal history of nicotine dependence    History of colonic polyps    Diverticulosis of large intestine    Internal hemorrhoids    Vitamin D deficiency    Impaired glucose tolerance    Gastroesophageal reflux disease    Bilateral carpal tunnel syndrome    PAD (peripheral artery disease)    Subclavian artery stenosis, left    Overweight (BMI 25.0-29.9)    Healthcare maintenance    Olecranon bursitis of left elbow         Current Outpatient Medications:     amLODIPine (NORVASC) 10 MG tablet, Take 1 tablet by mouth Every Night., Disp: 90 tablet, Rfl: 3    Aspirin Low Dose 81 MG EC tablet, TAKE 1 TABLET BY MOUTH DAILY, Disp: 90 tablet, Rfl: 0    cholecalciferol (VITAMIN D3) 25 MCG (1000 UT) tablet, Take 1 tablet by mouth Daily., Disp: , Rfl:     Evolocumab (REPATHA) solution auto-injector SureClick injection, Inject 1 mL under the skin into the appropriate area as directed Every 14 (Fourteen) Days., Disp: 2 mL, Rfl: 11    famotidine (PEPCID) 20 MG tablet, TAKE ONE TABLET BY MOUTH ONCE NIGHTLY, Disp: 90 tablet, Rfl: 0    losartan (COZAAR) 100 MG tablet, Take 1 tablet by mouth Daily., Disp: 90 tablet, Rfl: 3    Magnesium 300 MG capsule, Take 300 mg by mouth Daily., Disp: , Rfl:     metoprolol succinate XL (Toprol XL) 25 MG 24 hr tablet, Take 1 tablet by mouth Daily., Disp: 30 tablet, Rfl: 1    multivitamin with minerals tablet tablet, Take 1 tablet by mouth Daily., Disp: , Rfl:      reports that he quit smoking about 5 years ago. His smoking use included cigarettes. He started smoking about 45 years ago. He has a 20 pack-year smoking history. He has never used smokeless tobacco.      Objective:   Physical exam:  /66 (BP Location: Right arm, Patient Position: Sitting, Cuff Size: Adult)   Pulse 76   Ht 182.9 cm (72\")   Wt 90.5 kg " "(199 lb 9.6 oz)   SpO2 97%   BMI 27.07 kg/m²   CONSTITUTIONAL: No acute distress  CARDIOVASCULAR: Regular rate and rhythm with normal S1 and S2. Without murmur.  PERIPHERAL VASCULAR: Left carotid bruit.  2+ left radial pulse, right radial pulse slightly stronger    7/20/2023 exam: Left DP 2+  6/2025 exam: Right arm 110/80, left arm 108/76    Labs:    Lab Results   Component Value Date    LDL 43 04/28/2025     No components found for: \"LDLDIRECTC\"    Diagnostic Data:      ECG 12 Lead    Date/Time: 6/23/2025 2:05 PM  Performed by: Brandon Guo MD    Authorized by: Brandon Guo MD  Comparison: compared with previous ECG from 6/12/2023  Similar to previous ECG  Rhythm: sinus rhythm          Results for orders placed during the hospital encounter of 06/07/23    Adult Transthoracic Echo Complete W/ Cont if Necessary Per Protocol    Interpretation Summary    Left ventricular systolic function is normal. Left ventricular ejection fraction appears to be 61 - 65%.    The right ventricular cavity is borderline dilated.    The right atrial cavity is borderline dilated.      Assessment and Plan:     Left common iliac artery stenosis  -Arterial duplex ordered  -Continue aspirin, Repatha  -Joint pain with rosuvastatin  - Recommend a heart healthy diet and exercise    Left subclavian artery stenosis  Left carotid bruit  - Carotid duplex ultrasound scheduled on 6/30/2025  - Continue current related medicines    Essential hypertension  Mixed hyperlipidemia   - Continue current related medicines.  - AAA screening 6/30    - Return in about 1 year (around 6/23/2026) for Next follow up with JAGDISH Webb.    "

## 2025-06-24 ENCOUNTER — SPECIALTY PHARMACY (OUTPATIENT)
Facility: HOSPITAL | Age: 65
End: 2025-06-24
Payer: COMMERCIAL

## 2025-06-24 NOTE — PROGRESS NOTES
" Specialty Pharmacy Patient Management Program  Cardiology Specialty Pharmacy Refill Outreach      Alex \"Bill\" is a 65 y.o. male contacted today regarding refills of his medication(s).    Specialty medication(s) and dose(s) confirmed: Repatha 140mg subcutaneous every 14 days   Other medications being refilled: N/A    Refill Questions      Flowsheet Row Most Recent Value   Changes to allergies? No   Changes to medications? No   New conditions or infections since last clinic visit No   Unplanned office visit, urgent care, ED, or hospital admission in the last 4 weeks  No   How does patient/caregiver feel medication is working? Very good   Financial problems or insurance changes  No   Since the previous refill, were any specialty medication doses or scheduled injections missed or delayed?  No   Does this patient require a clinical escalation to a pharmacist? No            Delivery Questions      Flowsheet Row Most Recent Value   Delivery method UPS   Delivery address verified with patient/caregiver? Yes   Delivery address Home   Number of medications in delivery 1   Medication(s) being filled and delivered Evolocumab (REPATHA)   Doses left of specialty medications 1 pen   Copay verified? Yes   Copay amount 15.00   Copay form of payment Credit/debit on file   Delivery Date Selection 06/25/25   Signature Required No   Do you consent to receive electronic handouts?  No                   Follow-up: 28 days     Kerry Wright PharmD, BCPS  Clinical Specialty Pharmacist, Cardiology  6/24/2025  11:23 EDT  "

## 2025-06-24 NOTE — PROGRESS NOTES
Specialty Pharmacy Patient Management Program  Cardiology Reassessment     Alex Parr was referred by a Cardiology provider to the Cardiology Patient Management program offered by [facility] for Hyperlipidemia. A follow-up outreach was conducted, including assessment of continued therapy appropriateness, medication adherence, and side effect incidence and management for Repatha.    Changes to Insurance Coverage or Financial Support  No changes    Relevant Past Medical History and Comorbidities  Relevant medical history and concomitant health conditions were discussed with the patient. The patient's chart has been reviewed for relevant past medical history and comorbid health conditions and updated as necessary.   Past Medical History:   Diagnosis Date    Gout     Hyperlipidemia     Hypertension      Social History     Socioeconomic History    Marital status:    Tobacco Use    Smoking status: Former     Current packs/day: 0.00     Average packs/day: 0.5 packs/day for 40.1 years (20.0 ttl pk-yrs)     Types: Cigarettes     Start date:      Quit date: 2020     Years since quittin.4    Smokeless tobacco: Never   Vaping Use    Vaping status: Never Used   Substance and Sexual Activity    Alcohol use: Yes     Alcohol/week: 14.0 standard drinks of alcohol     Types: 14 Glasses of wine per week     Comment: 2 glasses of wine/night    Drug use: No    Sexual activity: Defer     Problem list reviewed by Kerry Wright, PharmD on 2025 at 11:20 AM    Hospitalizations and Urgent Care Since Last Assessment  ED Visits, Admissions, or Hospitalizations: None  Urgent Office Visits: None    Allergies  Known allergies and reactions were discussed with the patient. The patient's chart has been reviewed for allergy information and updated as necessary.   No Known Allergies  Allergies reviewed by Kerry Wright, PharmD on 2025 at 11:19 AM    Relevant Laboratory Values  Relevant  laboratory values were discussed with the patient. The following specialty medication dose adjustment(s) are recommended: No changes    Lab Results   Component Value Date    GLUCOSE 100 (H) 04/28/2025    CALCIUM 10.2 04/28/2025     04/28/2025    K 4.7 04/28/2025    CO2 24.2 04/28/2025     04/28/2025    BUN 11 04/28/2025    CREATININE 0.98 04/28/2025    EGFRIFAFRI 92 02/22/2021    EGFRIFNONA 80 02/22/2021    BCR 11.2 04/28/2025    ANIONGAP 12.8 04/28/2025     Lab Results   Component Value Date    CHOL 117 04/28/2025    CHLPL 177 04/04/2022    TRIG 60 04/28/2025    HDL 61 (H) 04/28/2025    LDL 43 04/28/2025       Current Medication List  This medication list has been reviewed with the patient and evaluated for any interactions or necessary modifications/recommendations, and updated to include all prescription medications, OTC medications, and supplements the patient is currently taking.  This list reflects what is contained in the patient's profile, which has also been marked as reviewed to communicate to other providers it is the most up to date version of the patient's current medication therapy.     Current Outpatient Medications:     Evolocumab (REPATHA) solution auto-injector SureClick injection, Inject 1 mL under the skin into the appropriate area as directed Every 14 (Fourteen) Days., Disp: 2 mL, Rfl: 12    amLODIPine (NORVASC) 10 MG tablet, Take 1 tablet by mouth Every Night., Disp: 90 tablet, Rfl: 3    Aspirin Low Dose 81 MG EC tablet, TAKE 1 TABLET BY MOUTH DAILY, Disp: 90 tablet, Rfl: 0    cholecalciferol (VITAMIN D3) 25 MCG (1000 UT) tablet, Take 1 tablet by mouth Daily., Disp: , Rfl:     famotidine (PEPCID) 20 MG tablet, TAKE ONE TABLET BY MOUTH ONCE NIGHTLY, Disp: 90 tablet, Rfl: 0    losartan (COZAAR) 100 MG tablet, Take 1 tablet by mouth Daily., Disp: 90 tablet, Rfl: 3    Magnesium 300 MG capsule, Take 300 mg by mouth Daily., Disp: , Rfl:     metoprolol succinate XL (Toprol XL) 25 MG 24 hr  tablet, Take 1 tablet by mouth Daily., Disp: 30 tablet, Rfl: 1    multivitamin with minerals tablet tablet, Take 1 tablet by mouth Daily., Disp: , Rfl:     Medicines reviewed by Kerry Wright, PharmD on 6/24/2025 at 11:20 AM    Drug Interactions  None    Adverse Drug Reactions  Medication tolerability: Tolerating with no to minimal ADRs  Medication plan: Continue therapy with normal follow-up  Plan for ADR Management: N/A    Adherence, Self-Administration, and Current Therapy Problems  Adherence related to the patient's specialty therapy was discussed with the patient. The Adherence segment of this outreach has been reviewed and updated.     Adherence Questions  Linked Medication(s) Assessed: Evolocumab (REPATHA)  On average, how many doses/injections does the patient miss per month?: 0  What are the identified reasons for non-adherence or missed doses? : no problems identified  What is the estimated medication adherence level?: %  Based on the patient/caregiver response and refill history, does this patient require an MTP to track adherence improvements?: no    Additional Barriers to Patient Self-Administration: N/A  Methods for Supporting Patient Self-Administration: N/A    Open Medication Therapy Problems  No medication therapy recommendations to display    Goals of Therapy  Goals related to the patient's specialty therapy were discussed with the patient. The Patient Goals segment of this outreach has been reviewed and updated.   Goals Addressed Today        Specialty Pharmacy General Goal      LDL Goal < 100 mg/dL    Lab Results   Component Value Date    LDL 43 04/28/2025    LDL 61 10/28/2024     (H) 04/29/2024    LDL 45 07/20/2023    LDL 87 06/20/2023 1/24/25: Last LDL on 10/28/24 down to 61 mg/dL; Continue Repatha  6/24/25: LDL two months ago down to 43 mg/dL; Continue Repatha                 Quality of Life Assessment   Quality of Life related to the patient's enrollment in the patient  management program and services provided was discussed with the patient. The QOL segment of this outreach has been reviewed and updated.  Quality of Life Improvement Scale: 9-A good deal better    Reassessment Plan & Follow-Up  1. Medication Therapy Changes: Continue Repatha 140mg subcutaneous every 14 days   2. Related Plans, Therapy Recommendations, or Issues to Be Addressed: None  3. Pharmacist to perform regular assessments no more than (6) months from the previous assessment.  4. Care Coordinator to set up future refill outreaches, coordinate prescription delivery, and escalate clinical questions to pharmacist.    Attestation  Therapeutic appropriateness: Appropriate   I attest the patient was actively involved in and has agreed to the above plan of care.  If the prescribed therapy is at any point deemed not appropriate based on the current or future assessments, a consultation will be initiated with the patient's specialty care provider to determine the best course of action. The revised plan of therapy will be documented along with any required assessments and/or additional patient education provided.     Kerry Wright, [place], BCPS  Clinical Specialty Pharmacist, Cardiology  6/24/2025  11:22 EDT

## 2025-06-30 ENCOUNTER — HOSPITAL ENCOUNTER (OUTPATIENT)
Dept: ULTRASOUND IMAGING | Facility: HOSPITAL | Age: 65
Discharge: HOME | End: 2025-06-30
Payer: COMMERCIAL

## 2025-06-30 ENCOUNTER — HOSPITAL ENCOUNTER (OUTPATIENT)
Dept: CARDIOLOGY | Facility: HOSPITAL | Age: 65
Discharge: HOME | End: 2025-06-30
Payer: COMMERCIAL

## 2025-06-30 VITALS — BODY MASS INDEX: 26.87 KG/M2 | HEIGHT: 72 IN | WEIGHT: 198.41 LBS

## 2025-06-30 DIAGNOSIS — R09.89 BRUIT OF LEFT CAROTID ARTERY: ICD-10-CM

## 2025-06-30 DIAGNOSIS — Z87.891 PERSONAL HISTORY OF NICOTINE DEPENDENCE: ICD-10-CM

## 2025-06-30 DIAGNOSIS — Z12.5 SCREENING FOR MALIGNANT NEOPLASM OF PROSTATE: ICD-10-CM

## 2025-06-30 DIAGNOSIS — R42 POSITIONAL LIGHTHEADEDNESS: ICD-10-CM

## 2025-06-30 PROCEDURE — 76706 US ABDL AORTA SCREEN AAA: CPT

## 2025-06-30 PROCEDURE — 93880 EXTRACRANIAL BILAT STUDY: CPT | Performed by: INTERNAL MEDICINE

## 2025-06-30 PROCEDURE — 93880 EXTRACRANIAL BILAT STUDY: CPT

## 2025-07-01 ENCOUNTER — TELEPHONE (OUTPATIENT)
Dept: CARDIOLOGY | Facility: CLINIC | Age: 65
End: 2025-07-01
Payer: COMMERCIAL

## 2025-07-01 PROBLEM — I71.40 ABDOMINAL AORTIC ANEURYSM (AAA) WITHOUT RUPTURE: Status: ACTIVE | Noted: 2025-07-01

## 2025-07-01 LAB
BH CV XLRA MEAS LEFT DIST CCA EDV: 31 CM/SEC
BH CV XLRA MEAS LEFT DIST CCA PSV: 171 CM/SEC
BH CV XLRA MEAS LEFT DIST ICA EDV: 27 CM/SEC
BH CV XLRA MEAS LEFT DIST ICA PSV: 100 CM/SEC
BH CV XLRA MEAS LEFT ICA/CCA RATIO: 0.88
BH CV XLRA MEAS LEFT MID CCA EDV: 22.4 CM/SEC
BH CV XLRA MEAS LEFT MID CCA PSV: 121 CM/SEC
BH CV XLRA MEAS LEFT MID ICA EDV: 24 CM/SEC
BH CV XLRA MEAS LEFT MID ICA PSV: 107 CM/SEC
BH CV XLRA MEAS LEFT PROX CCA EDV: 13.3 CM/SEC
BH CV XLRA MEAS LEFT PROX CCA PSV: 89.9 CM/SEC
BH CV XLRA MEAS LEFT PROX ECA EDV: 15 CM/SEC
BH CV XLRA MEAS LEFT PROX ECA PSV: 131 CM/SEC
BH CV XLRA MEAS LEFT PROX ICA EDV: 21 CM/SEC
BH CV XLRA MEAS LEFT PROX ICA PSV: 91 CM/SEC
BH CV XLRA MEAS LEFT PROX SCLA PSV: 275 CM/SEC
BH CV XLRA MEAS RIGHT DIST CCA EDV: 21 CM/SEC
BH CV XLRA MEAS RIGHT DIST CCA PSV: 100 CM/SEC
BH CV XLRA MEAS RIGHT DIST ICA EDV: 15 CM/SEC
BH CV XLRA MEAS RIGHT DIST ICA PSV: 55 CM/SEC
BH CV XLRA MEAS RIGHT ICA/CCA RATIO: 1.05
BH CV XLRA MEAS RIGHT MID CCA EDV: 21.2 CM/SEC
BH CV XLRA MEAS RIGHT MID CCA PSV: 112 CM/SEC
BH CV XLRA MEAS RIGHT MID ICA EDV: 21 CM/SEC
BH CV XLRA MEAS RIGHT MID ICA PSV: 76 CM/SEC
BH CV XLRA MEAS RIGHT PROX CCA EDV: 10.2 CM/SEC
BH CV XLRA MEAS RIGHT PROX CCA PSV: 103 CM/SEC
BH CV XLRA MEAS RIGHT PROX ECA EDV: 23 CM/SEC
BH CV XLRA MEAS RIGHT PROX ECA PSV: 195 CM/SEC
BH CV XLRA MEAS RIGHT PROX ICA EDV: 16 CM/SEC
BH CV XLRA MEAS RIGHT PROX ICA PSV: 118 CM/SEC
BH CV XLRA MEAS RIGHT PROX SCLA PSV: 116 CM/SEC
BH CV XLRA MEAS RIGHT VERTEBRAL A EDV: 12.1 CM/SEC
BH CV XLRA MEAS RIGHT VERTEBRAL A PSV: 44.5 CM/SEC
LEFT ARM BP: NORMAL MMHG
RIGHT ARM BP: NORMAL MMHG

## 2025-07-01 NOTE — TELEPHONE ENCOUNTER
----- Message from Brandon Guo sent at 7/1/2025  3:26 PM EDT -----  Josee, please let the patient know that I reviewed his abdominal aortic ultrasound and carotid duplex ultrasound.      Abdominal aortic screening ultrasound revealed a mildly aneurysmal abdominal aorta at 3 cm.  The Society of Vascular Surgery recommends a repeat evaluation in 3 years.  Continue risk factor/lifestyle modifications (blood pressure control, good cholesterol, not smoking, etc.) for this issue.  Repeat study should be around July 2028 unless symptoms develop.    Carotid duplex ultrasound looked good.  No significant blockage.  Left subclavian stent open.    We will follow-up again once he completes his lower extremity duplex

## 2025-07-02 RX ORDER — LOSARTAN POTASSIUM 100 MG/1
100 TABLET ORAL DAILY
Qty: 90 TABLET | Refills: 0 | Status: SHIPPED | OUTPATIENT
Start: 2025-07-02

## 2025-07-02 NOTE — TELEPHONE ENCOUNTER
Rx Refill Note  Requested Prescriptions     Pending Prescriptions Disp Refills    losartan (COZAAR) 100 MG tablet [Pharmacy Med Name: LOSARTAN POTASSIUM 100 MG TAB] 30 tablet      Sig: TAKE 1 TABLET BY MOUTH DAILY      Last office visit with prescribing clinician: 6/20/2025   Last telemedicine visit with prescribing clinician: Visit date not found   Next office visit with prescribing clinician: 7/14/2025       Karina Ray MA  07/02/25, 14:55 EDT

## 2025-07-02 NOTE — TELEPHONE ENCOUNTER
Pt notified of results. Pt agreeable to lifestyle and risk factor modifications. Pt has no further questions at this time.

## 2025-07-14 ENCOUNTER — OFFICE VISIT (OUTPATIENT)
Dept: FAMILY MEDICINE CLINIC | Facility: CLINIC | Age: 65
End: 2025-07-14
Payer: COMMERCIAL

## 2025-07-14 VITALS
BODY MASS INDEX: 27.06 KG/M2 | OXYGEN SATURATION: 98 % | SYSTOLIC BLOOD PRESSURE: 134 MMHG | HEIGHT: 72 IN | HEART RATE: 76 BPM | DIASTOLIC BLOOD PRESSURE: 84 MMHG | WEIGHT: 199.8 LBS

## 2025-07-14 DIAGNOSIS — I10 ESSENTIAL HYPERTENSION: Primary | ICD-10-CM

## 2025-07-14 DIAGNOSIS — R73.02 IMPAIRED GLUCOSE TOLERANCE: ICD-10-CM

## 2025-07-14 PROCEDURE — 99213 OFFICE O/P EST LOW 20 MIN: CPT | Performed by: PHYSICIAN ASSISTANT

## 2025-07-14 RX ORDER — METOPROLOL SUCCINATE 25 MG/1
25 TABLET, EXTENDED RELEASE ORAL DAILY
Qty: 90 TABLET | Refills: 2 | Status: SHIPPED | OUTPATIENT
Start: 2025-07-14

## 2025-07-14 NOTE — PROGRESS NOTES
Chief Complaint   Patient presents with    Hypertension       HPI     Alex Parr is a 65 y.o. male who is here for follow-up of hypertension.     The patient reports he has some fatigue but lightheadedness, dizziness, and nausea resolved since his last visit.  He states his BP at his cardiologist's office 3 weeks ago was normal. It is usually high at home. He thinks his cuff may be malfunctioning. Compliant on losartan, metoprolol and amlodipine.     Past Medical History:   Diagnosis Date    Gout     Hyperlipidemia     Hypertension        Past Surgical History:   Procedure Laterality Date    CARDIAC CATHETERIZATION N/A 2023    Procedure: Peripheral angiography;  Surgeon: Brandon Guo MD;  Location:  [facility];  Service: Cardiovascular;  Laterality: N/A;    CARDIAC CATHETERIZATION N/A 2023    Procedure: Angioplasty-peripheral;  Surgeon: Brandon Guo MD;  Location:  [facility];  Service: Cardiovascular;  Laterality: N/A;  Left subclavian intervention. No industry rep needed. Schedule in ~2-4 weeks    INGUINAL HERNIA REPAIR         Family History   Problem Relation Age of Onset    No Known Problems Mother     Stroke Father     Parkinsonism Father 80    Hypertension Father     ALS Maternal Grandmother     No Known Problems Maternal Grandfather     No Known Problems Paternal Grandmother     No Known Problems Paternal Grandfather        Social History     Socioeconomic History    Marital status:    Tobacco Use    Smoking status: Former     Current packs/day: 0.00     Average packs/day: 0.5 packs/day for 40.1 years (20.0 ttl pk-yrs)     Types: Cigarettes     Start date:      Quit date: 2020     Years since quittin.4    Smokeless tobacco: Never   Vaping Use    Vaping status: Never Used   Substance and Sexual Activity    Alcohol use: Yes     Alcohol/week: 14.0 standard drinks of alcohol     Types: 14 Glasses of wine per week     Comment: 2  glasses of wine/night    Drug use: No    Sexual activity: Defer       No Known Allergies    ROS    Review of Systems   Constitutional:  Positive for fatigue.   Respiratory:  Negative for shortness of breath.    Cardiovascular:  Negative for chest pain.   Gastrointestinal:  Negative for nausea.   Neurological:  Negative for dizziness and headache.       Vitals:    07/14/25 1031   BP: 134/84   Pulse: 76   SpO2: 98%     Body mass index is 27.36 kg/m².      Current Outpatient Medications:     amLODIPine (NORVASC) 10 MG tablet, Take 1 tablet by mouth Every Night., Disp: 90 tablet, Rfl: 3    Aspirin Low Dose 81 MG EC tablet, TAKE 1 TABLET BY MOUTH DAILY, Disp: 90 tablet, Rfl: 0    cholecalciferol (VITAMIN D3) 25 MCG (1000 UT) tablet, Take 1 tablet by mouth Daily., Disp: , Rfl:     Evolocumab (REPATHA) solution auto-injector SureClick injection, Inject 1 mL under the skin into the appropriate area as directed Every 14 (Fourteen) Days., Disp: 2 mL, Rfl: 12    famotidine (PEPCID) 20 MG tablet, TAKE ONE TABLET BY MOUTH ONCE NIGHTLY, Disp: 90 tablet, Rfl: 0    losartan (COZAAR) 100 MG tablet, TAKE 1 TABLET BY MOUTH DAILY, Disp: 90 tablet, Rfl: 0    Magnesium 300 MG capsule, Take 300 mg by mouth Daily., Disp: , Rfl:     metoprolol succinate XL (Toprol XL) 25 MG 24 hr tablet, Take 1 tablet by mouth Daily., Disp: 30 tablet, Rfl: 1    multivitamin with minerals tablet tablet, Take 1 tablet by mouth Daily., Disp: , Rfl:     PE    Physical Exam  Vitals reviewed.   Constitutional:       General: He is not in acute distress.     Appearance: He is well-developed.   HENT:      Head: Normocephalic and atraumatic.   Eyes:      Conjunctiva/sclera: Conjunctivae normal.   Cardiovascular:      Rate and Rhythm: Normal rate and regular rhythm.      Heart sounds: Normal heart sounds. No murmur heard.  Pulmonary:      Effort: Pulmonary effort is normal.      Breath sounds: Normal breath sounds.   Musculoskeletal:      Cervical back: Normal range  of motion.   Skin:     General: Skin is warm and dry.   Neurological:      Mental Status: He is alert.      Gait: Gait normal.   Psychiatric:         Speech: Speech normal.         Behavior: Behavior normal.         Results    Results for orders placed or performed during the hospital encounter of 06/30/25   Duplex Carotid Ultrasound CAR    Collection Time: 06/30/25 10:25 AM   Result Value Ref Range    Prox CCA .0 cm/sec    Prox CCA EDV 10.2 cm/sec    Right Mid CCA .0 cm/sec    right Mid CCA EDV 21.2 cm/sec    Dist CCA .0 cm/sec    Dist CCA EDV 21.0 cm/sec    Prox ICA .0 cm/sec    Prox ICA EDV 16.0 cm/sec    Mid ICA PSV 76.0 cm/sec    Mid ICA EDV 21.0 cm/sec    Dist ICA PSV 55.0 cm/sec    Dist ICA EDV 15.0 cm/sec    Prox ECA .0 cm/sec    Prox ECA EDV 23.0 cm/sec    Vertebral A PSV 44.5 cm/sec    Vertebral A EDV 12.1 cm/sec    Prox SCLA .0 cm/sec    Prox CCA PSV 89.9 cm/sec    Prox CCA EDV 13.3 cm/sec    left Mid CCA .0 cm/sec    left Mid CCA EDV 22.4 cm/sec    Dist CCA .0 cm/sec    Dist CCA EDV 31.0 cm/sec    Prox ICA PSV 91.0 cm/sec    Prox ICA EDV 21.0 cm/sec    Mid ICA .0 cm/sec    Mid ICA EDV 24.0 cm/sec    Dist ICA .0 cm/sec    Dist ICA EDV 27.0 cm/sec    Prox ECA .0 cm/sec    Prox ECA EDV 15.0 cm/sec    Prox SCLA .0 cm/sec    ICA/CCA ratio 1.05     ICA/CCA ratio 0.88     Right arm /86 mmHg    Left arm /78 mmHg       A/P    Problem List Items Addressed This Visit          Cardiac and Vasculature    Essential hypertension - Primary       Endocrine and Metabolic    Impaired glucose tolerance    Overview   A1C: 5.8 - 2/2020          -BP acceptable on repeat today. Continue present management. He understands fatigue may be a side-effect of metoprolol. He would like to continue this medication and monitor his fatigue at this time which is reasonable.   -RTC in October as previously scheduled, sooner prn.     Plan of care  was reviewed with patient at the conclusion of today's visit. Education was provided regarding diagnoses, management, and the importance of keeping follow-up appointments. The patient was counseled regarding the risks, benefits, and possible side-effects of treatment. Patient and/or family express understanding and agreement with the management plan.        Zaid Pena PA-C

## 2025-07-17 ENCOUNTER — SPECIALTY PHARMACY (OUTPATIENT)
Dept: CARDIOLOGY | Facility: CLINIC | Age: 65
End: 2025-07-17
Payer: COMMERCIAL

## 2025-07-28 ENCOUNTER — HOSPITAL ENCOUNTER (OUTPATIENT)
Dept: CARDIOLOGY | Facility: HOSPITAL | Age: 65
Discharge: HOME | End: 2025-07-28
Admitting: INTERNAL MEDICINE
Payer: COMMERCIAL

## 2025-07-28 DIAGNOSIS — I73.9 PAD (PERIPHERAL ARTERY DISEASE): ICD-10-CM

## 2025-07-28 LAB
BH CV LOWER ARTERIAL LEFT ABI RATIO: 1.16
BH CV LOWER ARTERIAL LEFT DORSALIS PEDIS SYS MAX: 150
BH CV LOWER ARTERIAL LEFT GREAT TOE SYS MAX: 108
BH CV LOWER ARTERIAL LEFT POST TIBIAL SYS MAX: 160
BH CV LOWER ARTERIAL LEFT TBI RATIO: 0.78
BH CV LOWER ARTERIAL RIGHT ABI RATIO: 1.25
BH CV LOWER ARTERIAL RIGHT DORSALIS PEDIS SYS MAX: 162
BH CV LOWER ARTERIAL RIGHT GREAT TOE SYS MAX: 123
BH CV LOWER ARTERIAL RIGHT POST TIBIAL SYS MAX: 172
BH CV LOWER ARTERIAL RIGHT TBI RATIO: 0.89
UPPER ARTERIAL LEFT ARM BRACHIAL SYS MAX: 129
UPPER ARTERIAL RIGHT ARM BRACHIAL SYS MAX: 138

## 2025-07-28 PROCEDURE — 93923 UPR/LXTR ART STDY 3+ LVLS: CPT

## 2025-07-28 PROCEDURE — 93923 UPR/LXTR ART STDY 3+ LVLS: CPT | Performed by: INTERNAL MEDICINE

## 2025-07-29 ENCOUNTER — RESULTS FOLLOW-UP (OUTPATIENT)
Dept: CARDIOLOGY | Facility: CLINIC | Age: 65
End: 2025-07-29
Payer: COMMERCIAL

## 2025-08-09 DIAGNOSIS — Z00.00 HEALTHCARE MAINTENANCE: ICD-10-CM

## 2025-08-09 RX ORDER — ASPIRIN 81 MG/1
81 TABLET, COATED ORAL DAILY
Qty: 90 TABLET | Refills: 0 | Status: SHIPPED | OUTPATIENT
Start: 2025-08-09

## 2025-08-13 ENCOUNTER — TELEPHONE (OUTPATIENT)
Dept: CARDIOLOGY | Facility: CLINIC | Age: 65
End: 2025-08-13
Payer: COMMERCIAL

## 2025-08-13 ENCOUNTER — SPECIALTY PHARMACY (OUTPATIENT)
Dept: CARDIOLOGY | Facility: CLINIC | Age: 65
End: 2025-08-13
Payer: COMMERCIAL

## (undated) DEVICE — ST ACC MICROPUNCTURE .018 TRANSLSS/SS/TP 5F/10CM 21G/7CM

## (undated) DEVICE — DEV INFL MONARCH 25W

## (undated) DEVICE — ANGIO-SEAL VIP VASCULAR CLOSURE DEVICE: Brand: ANGIO-SEAL

## (undated) DEVICE — GUIDE CATHETER: Brand: MACH1™

## (undated) DEVICE — NC TREK NEO™ CORONARY DILATATION CATHETER 6.00 MM X 12 MM / RAPID-EXCHANGE: Brand: NC TREK NEO™

## (undated) DEVICE — GW PERIPH VASC ADX J/TP SS .035 150CM 3MM

## (undated) DEVICE — MODEL BT2000 P/N 700287-012KIT CONTENTS: MANIFOLD WITH SALINE AND CONTRAST PORTS, SALINE TUBING WITH SPIKE AND HAND SYRINGE, TRANSDUCER: Brand: BT2000 AUTOMATED MANIFOLD KIT

## (undated) DEVICE — Device

## (undated) DEVICE — ADULT, W/LG. BACK PAD, RADIOTRANSPARENT ELEMENT AND LEAD WIRE: Brand: DEFIBRILLATION ELECTRODES

## (undated) DEVICE — MODEL AT P65, P/N 701554-001KIT CONTENTS: HAND CONTROLLER, 3-WAY HIGH-PRESSURE STOPCOCK WITH ROTATING END AND PREMIUM HIGH-PRESSURE TUBING: Brand: ANGIOTOUCH® KIT

## (undated) DEVICE — CVR PROB ULTRASND/TRANSD W/GEL 7X11IN STRL

## (undated) DEVICE — RUNTHROUGH NS EXTRA FLOPPY PTCA GUIDEWIRE: Brand: RUNTHROUGH

## (undated) DEVICE — Device: Brand: ASAHI SION BLUE

## (undated) DEVICE — PK CATH CARD 10

## (undated) DEVICE — PINNACLE INTRODUCER SHEATH: Brand: PINNACLE

## (undated) DEVICE — ST EXT IV SMRTSTE 2VLV FIX M LL 6ML 41